# Patient Record
Sex: FEMALE | Race: WHITE | NOT HISPANIC OR LATINO | Employment: OTHER | URBAN - METROPOLITAN AREA
[De-identification: names, ages, dates, MRNs, and addresses within clinical notes are randomized per-mention and may not be internally consistent; named-entity substitution may affect disease eponyms.]

---

## 2020-01-02 ENCOUNTER — TELEPHONE (OUTPATIENT)
Dept: SCHEDULING | Facility: IMAGING CENTER | Age: 65
End: 2020-01-02

## 2020-01-07 ENCOUNTER — OFFICE VISIT (OUTPATIENT)
Dept: MEDICAL GROUP | Facility: MEDICAL CENTER | Age: 65
End: 2020-01-07
Payer: COMMERCIAL

## 2020-01-07 ENCOUNTER — HOSPITAL ENCOUNTER (OUTPATIENT)
Dept: LAB | Facility: MEDICAL CENTER | Age: 65
End: 2020-01-07
Attending: NURSE PRACTITIONER
Payer: COMMERCIAL

## 2020-01-07 VITALS
SYSTOLIC BLOOD PRESSURE: 124 MMHG | HEART RATE: 74 BPM | WEIGHT: 192 LBS | BODY MASS INDEX: 29.1 KG/M2 | OXYGEN SATURATION: 92 % | HEIGHT: 68 IN | RESPIRATION RATE: 16 BRPM | DIASTOLIC BLOOD PRESSURE: 76 MMHG

## 2020-01-07 DIAGNOSIS — E78.5 DYSLIPIDEMIA: ICD-10-CM

## 2020-01-07 DIAGNOSIS — G47.33 OSA ON CPAP: ICD-10-CM

## 2020-01-07 DIAGNOSIS — G62.9 NEUROPATHY: ICD-10-CM

## 2020-01-07 DIAGNOSIS — C85.14 B-CELL LYMPHOMA OF LYMPH NODES OF AXILLA, UNSPECIFIED B-CELL LYMPHOMA TYPE (HCC): ICD-10-CM

## 2020-01-07 DIAGNOSIS — I10 ESSENTIAL HYPERTENSION: ICD-10-CM

## 2020-01-07 DIAGNOSIS — I44.7 LBBB (LEFT BUNDLE BRANCH BLOCK): ICD-10-CM

## 2020-01-07 DIAGNOSIS — Z00.00 ROUTINE GENERAL MEDICAL EXAMINATION AT A HEALTH CARE FACILITY: ICD-10-CM

## 2020-01-07 DIAGNOSIS — Z11.59 NEED FOR HEPATITIS C SCREENING TEST: ICD-10-CM

## 2020-01-07 LAB
BASOPHILS # BLD AUTO: 0.5 % (ref 0–1.8)
BASOPHILS # BLD: 0.03 K/UL (ref 0–0.12)
EOSINOPHIL # BLD AUTO: 0.1 K/UL (ref 0–0.51)
EOSINOPHIL NFR BLD: 1.6 % (ref 0–6.9)
ERYTHROCYTE [DISTWIDTH] IN BLOOD BY AUTOMATED COUNT: 44.9 FL (ref 35.9–50)
HCT VFR BLD AUTO: 45.7 % (ref 37–47)
HGB BLD-MCNC: 14.6 G/DL (ref 12–16)
IMM GRANULOCYTES # BLD AUTO: 0.02 K/UL (ref 0–0.11)
IMM GRANULOCYTES NFR BLD AUTO: 0.3 % (ref 0–0.9)
LYMPHOCYTES # BLD AUTO: 2.27 K/UL (ref 1–4.8)
LYMPHOCYTES NFR BLD: 37.1 % (ref 22–41)
MCH RBC QN AUTO: 30.4 PG (ref 27–33)
MCHC RBC AUTO-ENTMCNC: 31.9 G/DL (ref 33.6–35)
MCV RBC AUTO: 95 FL (ref 81.4–97.8)
MONOCYTES # BLD AUTO: 0.56 K/UL (ref 0–0.85)
MONOCYTES NFR BLD AUTO: 9.2 % (ref 0–13.4)
NEUTROPHILS # BLD AUTO: 3.14 K/UL (ref 2–7.15)
NEUTROPHILS NFR BLD: 51.3 % (ref 44–72)
NRBC # BLD AUTO: 0 K/UL
NRBC BLD-RTO: 0 /100 WBC
PLATELET # BLD AUTO: 213 K/UL (ref 164–446)
PMV BLD AUTO: 10.3 FL (ref 9–12.9)
RBC # BLD AUTO: 4.81 M/UL (ref 4.2–5.4)
WBC # BLD AUTO: 6.1 K/UL (ref 4.8–10.8)

## 2020-01-07 PROCEDURE — 86803 HEPATITIS C AB TEST: CPT

## 2020-01-07 PROCEDURE — 85025 COMPLETE CBC W/AUTO DIFF WBC: CPT

## 2020-01-07 PROCEDURE — 36415 COLL VENOUS BLD VENIPUNCTURE: CPT

## 2020-01-07 PROCEDURE — 99203 OFFICE O/P NEW LOW 30 MIN: CPT | Performed by: NURSE PRACTITIONER

## 2020-01-07 RX ORDER — GABAPENTIN 100 MG/1
100 CAPSULE ORAL 3 TIMES DAILY
Qty: 270 CAP | Refills: 3 | Status: SHIPPED | OUTPATIENT
Start: 2020-01-07 | End: 2020-12-25

## 2020-01-07 RX ORDER — CARVEDILOL 3.12 MG/1
3.12 TABLET ORAL 2 TIMES DAILY WITH MEALS
Qty: 180 TAB | Refills: 3 | Status: SHIPPED | OUTPATIENT
Start: 2020-01-07 | End: 2020-12-25

## 2020-01-07 RX ORDER — LOSARTAN POTASSIUM 25 MG/1
25 TABLET ORAL DAILY
COMMUNITY
End: 2020-01-07 | Stop reason: SDUPTHER

## 2020-01-07 RX ORDER — ATORVASTATIN CALCIUM 10 MG/1
10 TABLET, FILM COATED ORAL DAILY
Qty: 90 TAB | Refills: 3 | Status: SHIPPED | OUTPATIENT
Start: 2020-01-07 | End: 2020-12-25

## 2020-01-07 RX ORDER — CARVEDILOL 3.12 MG/1
3.12 TABLET ORAL 2 TIMES DAILY WITH MEALS
COMMUNITY
End: 2020-01-07 | Stop reason: SDUPTHER

## 2020-01-07 RX ORDER — GABAPENTIN 100 MG/1
100 CAPSULE ORAL 3 TIMES DAILY
COMMUNITY
End: 2020-01-07 | Stop reason: SDUPTHER

## 2020-01-07 RX ORDER — ASPIRIN 81 MG/1
81 TABLET, CHEWABLE ORAL DAILY
COMMUNITY
End: 2022-03-29

## 2020-01-07 RX ORDER — LOSARTAN POTASSIUM 25 MG/1
25 TABLET ORAL DAILY
Qty: 90 TAB | Refills: 3 | Status: SHIPPED | OUTPATIENT
Start: 2020-01-07 | End: 2020-12-25

## 2020-01-07 SDOH — HEALTH STABILITY: MENTAL HEALTH: HOW OFTEN DO YOU HAVE 6 OR MORE DRINKS ON ONE OCCASION?: LESS THAN MONTHLY

## 2020-01-07 ASSESSMENT — PATIENT HEALTH QUESTIONNAIRE - PHQ9: CLINICAL INTERPRETATION OF PHQ2 SCORE: 0

## 2020-01-07 NOTE — PROGRESS NOTES
Subjective:      Karla Hinson is a 64 y.o. 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  She and her  recently moved here from [place] to retire.    HPI         1. Essential hypertension  Patient reports her blood pressure has been well controlled on low-dose losartan and carvedilol and she does need refills.    2. 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 LBBB (left bundle branch block)  Patient states she was following with cardiology for this and there was a feeling she may have had some heart damage from her chemotherapy for her lymphoma.  She did have a recent echocardiogram that showed an ejection fraction at 60%.  She would like to get established with local cardiology    4. LAKIA on CPAP  Patient states she uses her CPAP every night and it is helpful    5. Neuropathy  Patient has some neuropathy secondary to her chemotherapy and uses gabapentin for this    6. Dyslipidemia  Patient currently on atorvastatin 10 mg and last cholesterol levels from 1 month ago was normal    7. Need for hepatitis C screening test  Patient due for screening    8. Routine general medical examination at a health care facility  Patient due for routine blood work  History reviewed. No pertinent past medical history.  Social History     Socioeconomic History   • Marital status:      Spouse name: Not on file   • Number of children: Not on file   • Years of education: Not on file   • Highest education level: Not on file   Occupational History   • Not on file   Social Needs   • Financial  resource strain: Not on file   • Food insecurity:     Worry: Not on file     Inability: Not on file   • Transportation needs:     Medical: Not on file     Non-medical: Not on file   Tobacco Use   • Smoking status: Never Smoker   • Smokeless tobacco: Never Used   Substance and Sexual Activity   • Alcohol use: Yes     Binge frequency: Less than monthly   • Drug use: Never   • Sexual activity: Yes     Partners: Male   Lifestyle   • Physical activity:     Days per week: Not on file     Minutes per session: Not on file   • Stress: Not on file   Relationships   • Social connections:     Talks on phone: Not on file     Gets together: Not on file     Attends Yazdanism service: Not on file     Active member of club or organization: Not on file     Attends meetings of clubs or organizations: Not on file     Relationship status: Not on file   • Intimate partner violence:     Fear of current or ex partner: Not on file     Emotionally abused: Not on file     Physically abused: Not on file     Forced sexual activity: Not on file   Other Topics Concern   • Not on file   Social History Narrative   • Not on file     Current Outpatient Medications   Medication Sig Dispense Refill   • Multiple Vitamins-Minerals (MULTIVITAMIN ADULT PO) Take  by mouth.     • Calcium Carb-Cholecalciferol (CALCIUM + D3 PO) Take  by mouth.     • aspirin (ASA) 81 MG Chew Tab chewable tablet Take 81 mg by mouth every day.     • gabapentin (NEURONTIN) 100 MG Cap Take 1 Cap by mouth 3 times a day. 270 Cap 3   • atorvastatin (LIPITOR) 10 MG Tab Take 1 Tab by mouth every day. 90 Tab 3   • carvedilol (COREG) 3.125 MG Tab Take 1 Tab by mouth 2 times a day, with meals. 180 Tab 3   • losartan (COZAAR) 25 MG Tab Take 1 Tab by mouth every day. 90 Tab 3     No current facility-administered medications for this visit.      Family History   Problem Relation Age of Onset   • Hypertension Mother    • Lung Disease Father    • Cancer Father         lung   • Cancer Brother   "       MM         Review of Systems   Neurological: Positive for tingling.   All other systems reviewed and are negative.         Objective:     /76 (BP Location: Right arm, Patient Position: Sitting, BP Cuff Size: Adult)   Pulse 74   Resp 16   Ht 1.727 m (5' 8\")   Wt 87.1 kg (192 lb)   SpO2 92%   BMI 29.19 kg/m²      Physical Exam  Vitals signs and nursing note reviewed.   Constitutional:       General: She is not in acute distress.     Appearance: She is well-developed. She is not diaphoretic.   HENT:      Head: Normocephalic and atraumatic.      Right Ear: External ear normal.      Left Ear: External ear normal.      Nose: Nose normal.   Eyes:      General:         Right eye: No discharge.         Left eye: No discharge.   Neck:      Musculoskeletal: Normal range of motion and neck supple.      Thyroid: No thyromegaly.   Cardiovascular:      Rate and Rhythm: Normal rate and regular rhythm.      Heart sounds: Normal heart sounds. No murmur. No friction rub. No gallop.    Pulmonary:      Effort: Pulmonary effort is normal.      Breath sounds: Normal breath sounds. No wheezing or rales.   Musculoskeletal:         General: No tenderness.   Skin:     General: Skin is warm and dry.      Findings: No rash.   Neurological:      Mental Status: She is alert and oriented to person, place, and time.      Deep Tendon Reflexes: Reflexes normal.   Psychiatric:         Behavior: Behavior normal.         Thought Content: Thought content normal.         Judgment: Judgment normal.                 Assessment/Plan:       1. Essential hypertension  Blood pressure medications refilled and blood pressure appears controlled today.  - REFERRAL TO CARDIOLOGY  - carvedilol (COREG) 3.125 MG Tab; Take 1 Tab by mouth 2 times a day, with meals.  Dispense: 180 Tab; Refill: 3  - losartan (COZAAR) 25 MG Tab; Take 1 Tab by mouth every day.  Dispense: 90 Tab; Refill: 3    2. 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  - CBC WITH DIFFERENTIAL; Future    3. LBBB (left bundle branch block)  Patient states she was following with cardiology in [place] and would like to get established locally with cardiology for this problem  - REFERRAL TO CARDIOLOGY    4. LAKIA on CPAP  Patient will need to establish with a local sleep center to monitor her CPAP readings and do periodic studies.  - REFERRAL TO SLEEP STUDIES    5. Neuropathy  Patient feels this is helping with her neuropathy and she takes no other medicine for pain  - gabapentin (NEURONTIN) 100 MG Cap; Take 1 Cap by mouth 3 times a day.  Dispense: 270 Cap; Refill: 3    6. Dyslipidemia  Patient will continue with her atorvastatin and most recent cholesterol levels were good.    - atorvastatin (LIPITOR) 10 MG Tab; Take 1 Tab by mouth every day.  Dispense: 90 Tab; Refill: 3    7. Need for hepatitis C screening test    - HEP C VIRUS ANTIBODY; Future    8. Routine general medical examination at a health care facility  I was going to do some other lab work but it appears she is up-to-date.

## 2020-01-08 LAB — HCV AB SER QL: NEGATIVE

## 2020-01-08 ASSESSMENT — ENCOUNTER SYMPTOMS: TINGLING: 1

## 2020-02-24 ENCOUNTER — TELEPHONE (OUTPATIENT)
Dept: CARDIOLOGY | Facility: MEDICAL CENTER | Age: 65
End: 2020-02-24

## 2020-02-25 NOTE — TELEPHONE ENCOUNTER
Called patient to see if she has followed with a cardiologist in the past to get records prior to new patient appt with CR. She states she was previously seen by Dr. Jayden Lowe at [facility]. Faxed stat records request for patients records (F: [phone], P: [phone]). Fax confirmation received.

## 2020-04-21 ENCOUNTER — APPOINTMENT (OUTPATIENT)
Dept: CARDIOLOGY | Facility: MEDICAL CENTER | Age: 65
End: 2020-04-21
Payer: COMMERCIAL

## 2020-06-02 ENCOUNTER — TELEMEDICINE (OUTPATIENT)
Dept: SLEEP MEDICINE | Facility: MEDICAL CENTER | Age: 65
End: 2020-06-02
Payer: COMMERCIAL

## 2020-06-02 VITALS — HEIGHT: 68 IN | BODY MASS INDEX: 28.64 KG/M2 | WEIGHT: 189 LBS

## 2020-06-02 DIAGNOSIS — G47.33 OSA ON CPAP: ICD-10-CM

## 2020-06-02 PROCEDURE — 99203 OFFICE O/P NEW LOW 30 MIN: CPT | Mod: 95,CR | Performed by: FAMILY MEDICINE

## 2020-06-02 NOTE — PROGRESS NOTES
"Telemedicine Visit: New Patient     This encounter was conducted via Zoom . Verbal consent was obtained. Patient's identity was verified.         [facility]  Consult Note     Date: 6/2/2020 / Time: 8:48 AM    Patient ID:   Name:             Karla Perry   YOB: 1955  Age:                 65 y.o.  female   MRN:               [id number]      Thank you for requesting a sleep medicine consultation on Karla Villeda at the sleep center.She presents today with the chief complaints of LAKIA and to establish as a new pt. She was  was diagnosed with LAKIA about 10 years ago in [facility] and has been on CPAP since then. Her current CPAP is about 2 years old. She has recently moved to [place] and here to establish sleep medicine care.The initial presenting symptoms were snoring, witnessed apneas. She is referred by Richie Quan for evaluation and treatment of sleep disorder breathing .     HISTORY OF PRESENT ILLNESS:       At night,  Karla Villeda goes to bed around 10:30-11 pm on weekdays and on the weekends. She gets out of bed at 7 am on weekdays and on the weekends.  She  Averages about 7-8 hrs of sleep on a good night. Pt rarely has bad nights. She falls asleep within 10 minutes. She wakes up about 2-3 times during the night due to bathroom use and on average It takes her 2-5 min to fall back asleep.     She is not aware of snoring/breathing pauses/gasping or choking in sleep with the usage of the CPAP.  She  Has occasional symptoms of restless legs syndrome such as an \"urge to move\"  She  legs in the evening or bedtime. She  denies any symptoms of narcolepsy such as sleep paralysis or cataplexy, or any symptoms to suggest parasomnias such as sleep walking or acting out of dreams. She  has not used any medications for the sleep problem.Overall, she does finds her sleep refreshing. In terms of  excessive daytime sleepiness,  She denies of sleepiness while  at work, while " reading or watching TV or while driving.She rarely take regular naps.     REVIEW OF SYSTEMS:       Constitutional: Denies fevers, Denies weight changes  Eyes: Denies changes in vision, no eye pain  Ears/Nose/Throat/Mouth: Denies nasal congestion or sore throat   Cardiovascular: Denies chest pain or palpitations   Respiratory: Denies shortness of breath , Denies cough  Gastrointestinal/Hepatic: Denies abdominal pain, nausea, vomiting, diarrhea, constipation or GI bleeding   Genitourinary: Denies bladder dysfunction, dysuria or frequency  Musculoskeletal/Rheum: Denies  joint pain and swelling   Skin/Breast: Denies rash  Neurological: Denies headache, confusion, memory loss or focal weakness/parasthesias  Psychiatric: denies mood disorder     Comprehensive review of systems form is reviewed with the patient and is attached in the EMR.     PMH:  has a past medical history of Chickenpox and [place] measles.  MEDS:   Current Outpatient Medications:   •  Multiple Vitamins-Minerals (MULTIVITAMIN ADULT PO), Take  by mouth., Disp: , Rfl:   •  Calcium Carb-Cholecalciferol (CALCIUM + D3 PO), Take  by mouth., Disp: , Rfl:   •  aspirin (ASA) 81 MG Chew Tab chewable tablet, Take 81 mg by mouth every day., Disp: , Rfl:   •  gabapentin (NEURONTIN) 100 MG Cap, Take 1 Cap by mouth 3 times a day., Disp: 270 Cap, Rfl: 3  •  atorvastatin (LIPITOR) 10 MG Tab, Take 1 Tab by mouth every day., Disp: 90 Tab, Rfl: 3  •  carvedilol (COREG) 3.125 MG Tab, Take 1 Tab by mouth 2 times a day, with meals., Disp: 180 Tab, Rfl: 3  •  losartan (COZAAR) 25 MG Tab, Take 1 Tab by mouth every day., Disp: 90 Tab, Rfl: 3  ALLERGIES:   Allergies   Allergen Reactions   • Ampicillin      rash   • Biaxin [Clarithromycin]      rash   • Sulfa Drugs      rash     SURGHX:   Past Surgical History:   Procedure Laterality Date   • LUMBAR LAMINECTOMY DISKECTOMY     • LYMPH NODE EXCISION     • TONSILLECTOMY     • TONSILLECTOMY AND ADENOIDECTOMY     • TUBAL COAGULATION  "LAPAROSCOPIC BILATERAL       SOCHX:  reports that she has never smoked. She has never used smokeless tobacco. She reports current alcohol use. She reports that she does not use drugs.   FH:   Family History   Problem Relation Age of Onset   • Hypertension Mother    • Lung Disease Father    • Cancer Father         lung   • Cancer Brother         MM   • Sleep Apnea Neg Hx        Physical Exam:  Vitals/ General Appearance:   Weight/BMI: Body mass index is 28.74 kg/m².  Ht 1.727 m (5' 8\")   Wt 85.7 kg (189 lb)   Vitals:    06/02/20 0830   Weight: 85.7 kg (189 lb)   Height: 1.727 m (5' 8\")           Constitutional: Alert, no distress, well-groomed.  Skin: No rashes in visible areas.  Eye: Round. Conjunctiva clear, lids normal. No icterus.   ENMT: Lips pink without lesions, good dentition, moist mucous membranes. Phonation normal.  Neck: No masses, no thyromegaly. Moves freely without pain.  CV: Pulse as reported by patient  Respiratory: Unlabored respiratory effort, no cough or audible wheeze  Psych: Alert and oriented x3, normal affect and mood.   INVESTIGATIONS:     1.Obstructive Sleep Apnea     The pathophysiology of sleep anea and the increased risk of cardiovascular morbidity from untreated sleep apnea is discussed in detail with the patient. She  also has HTN which can be worsened by her LAKIA.     She is urged to avoid supine sleep, weight gain and alcoholic beverages since all of these can worsen sleep apnea. She is cautioned against drowsy driving. If She feels sleepy while driving, She must pull over for a break/nap, rather than persist on the road, in the interest of She own safety and that of others on the road.   Plan   - Continue CPAP with FFM mask    - Recommended to bring the CPAP for compliance download and to sign the release of medical record form to obtain her previous SS    - If we are unable to obtain her previous SS, I will order HST to reestablish the diagnosis   - compliance was reinforced     2. " Regarding treatment of other past medical problems and general health maintenance,  She is urged to follow up with PCP.

## 2020-06-17 ENCOUNTER — PATIENT MESSAGE (OUTPATIENT)
Dept: SLEEP MEDICINE | Facility: MEDICAL CENTER | Age: 65
End: 2020-06-17

## 2020-06-17 ENCOUNTER — TELEPHONE (OUTPATIENT)
Dept: SLEEP MEDICINE | Facility: MEDICAL CENTER | Age: 65
End: 2020-06-17

## 2020-06-17 NOTE — TELEPHONE ENCOUNTER
Pt brought in her SD card to be download compliance report is scan into media, please advise. Pt also signed release of records as well.

## 2020-06-17 NOTE — TELEPHONE ENCOUNTER
The 30 day compliance was downloaded which shows adequate compliance with more that 4 hr usage about 77%. The AHI is has improved to 5.2/hr. The mask leak is normal. Continue CPAP at the current pressure.

## 2020-06-17 NOTE — TELEPHONE ENCOUNTER
Called pt to relay Dr. Bingham regarding the pt compliance downloaded but the pt stated that she saw the [place] message and understood.

## 2020-07-10 ENCOUNTER — OFFICE VISIT (OUTPATIENT)
Dept: CARDIOLOGY | Facility: MEDICAL CENTER | Age: 65
End: 2020-07-10
Payer: COMMERCIAL

## 2020-07-10 VITALS
OXYGEN SATURATION: 97 % | WEIGHT: 190 LBS | SYSTOLIC BLOOD PRESSURE: 108 MMHG | HEIGHT: 68 IN | DIASTOLIC BLOOD PRESSURE: 50 MMHG | HEART RATE: 68 BPM | BODY MASS INDEX: 28.79 KG/M2

## 2020-07-10 DIAGNOSIS — I44.7 LBBB (LEFT BUNDLE BRANCH BLOCK): ICD-10-CM

## 2020-07-10 DIAGNOSIS — I42.8 OTHER CARDIOMYOPATHY (HCC): ICD-10-CM

## 2020-07-10 LAB — EKG IMPRESSION: NORMAL

## 2020-07-10 PROCEDURE — 93000 ELECTROCARDIOGRAM COMPLETE: CPT | Performed by: INTERNAL MEDICINE

## 2020-07-10 PROCEDURE — 99244 OFF/OP CNSLTJ NEW/EST MOD 40: CPT | Performed by: INTERNAL MEDICINE

## 2020-07-10 ASSESSMENT — ENCOUNTER SYMPTOMS
NEUROLOGICAL NEGATIVE: 1
PSYCHIATRIC NEGATIVE: 1
CONSTITUTIONAL NEGATIVE: 1
ORTHOPNEA: 0
CLAUDICATION: 0
MUSCULOSKELETAL NEGATIVE: 1
GASTROINTESTINAL NEGATIVE: 1
HEMOPTYSIS: 0
PALPITATIONS: 0
COUGH: 0
WHEEZING: 0

## 2020-07-10 NOTE — PROGRESS NOTES
Chief Complaint   Patient presents with   • LBBB     new patient   Cardiomyopathy    Subjective:   Karla Villeda is a 65 y.o. female who presents today to establish cardiac care    She is seen in consultation at request of SLOANE Stubbs for above issue.    The patient moved here from [facility] a few months ago.    She had history of ventricular tachycardia and bradycardia (HR in the 30s) during her chemotherapy (doxyrubicin, vincristine, rituximab and cyclophosphamide) for non-Hodgkin's lymphoma in 2012.  Cardiac work-up at that time was normal.   The arrhythmias lasted for a few days without any recurrences since.    In November 2017 she was noted to have left bundle branch block and atypical chest pain.  Subsequent cardiac catheterization in February 2018 showed no evidence of coronary artery disease.   Echocardiography reportedly showed mildly reduced left ventricular systolic function with ejection fraction of 40 to 45%.  She was subsequently started on carvedilol 3.125 mg twice a day and losartan.    Has repeat echocardiography in May of last year which showed normal LV systolic function.    She is asymptomatic from cardiac standpoint except slight breathless with walking up a couple flights of stair and mild swelling in her ankle at the end of the day.    Past Medical History:   Diagnosis Date   • Chickenpox    • [place] measles      Past Surgical History:   Procedure Laterality Date   • LUMBAR LAMINECTOMY DISKECTOMY     • LYMPH NODE EXCISION     • TONSILLECTOMY     • TONSILLECTOMY AND ADENOIDECTOMY     • TUBAL COAGULATION LAPAROSCOPIC BILATERAL       Family History   Problem Relation Age of Onset   • Hypertension Mother    • Lung Disease Father    • Cancer Father         lung   • Cancer Brother         MM   • Sleep Apnea Neg Hx      Social History     Socioeconomic History   • Marital status:      Spouse name: Not on file   • Number of children: Not on file   • Years of  education: Not on file   • Highest education level: Not on file   Occupational History   • Not on file   Social Needs   • Financial resource strain: Not on file   • Food insecurity     Worry: Not on file     Inability: Not on file   • Transportation needs     Medical: Not on file     Non-medical: Not on file   Tobacco Use   • Smoking status: Never Smoker   • Smokeless tobacco: Never Used   Substance and Sexual Activity   • Alcohol use: Yes     Binge frequency: Less than monthly     Comment: 1 a week    • Drug use: Never   • Sexual activity: Yes     Partners: Male   Lifestyle   • Physical activity     Days per week: Not on file     Minutes per session: Not on file   • Stress: Not on file   Relationships   • Social connections     Talks on phone: Not on file     Gets together: Not on file     Attends Yazidi service: Not on file     Active member of club or organization: Not on file     Attends meetings of clubs or organizations: Not on file     Relationship status: Not on file   • Intimate partner violence     Fear of current or ex partner: Not on file     Emotionally abused: Not on file     Physically abused: Not on file     Forced sexual activity: Not on file   Other Topics Concern   • Not on file   Social History Narrative   • Not on file     Allergies   Allergen Reactions   • Ampicillin      rash   • Biaxin [Clarithromycin]      rash   • Sulfa Drugs      rash     Outpatient Encounter Medications as of 7/10/2020   Medication Sig Dispense Refill   • Multiple Vitamins-Minerals (MULTIVITAMIN ADULT PO) Take  by mouth.     • Calcium Carb-Cholecalciferol (CALCIUM + D3 PO) Take  by mouth.     • aspirin (ASA) 81 MG Chew Tab chewable tablet Take 81 mg by mouth every day.     • gabapentin (NEURONTIN) 100 MG Cap Take 1 Cap by mouth 3 times a day. 270 Cap 3   • atorvastatin (LIPITOR) 10 MG Tab Take 1 Tab by mouth every day. 90 Tab 3   • carvedilol (COREG) 3.125 MG Tab Take 1 Tab by mouth 2 times a day, with meals. 180 Tab 3  "  • losartan (COZAAR) 25 MG Tab Take 1 Tab by mouth every day. 90 Tab 3     No facility-administered encounter medications on file as of 7/10/2020.      Review of Systems   Constitutional: Negative.    HENT: Negative.    Respiratory: Negative for cough, hemoptysis and wheezing.    Cardiovascular: Positive for leg swelling. Negative for chest pain, palpitations, orthopnea and claudication.   Gastrointestinal: Negative.    Genitourinary: Negative.    Musculoskeletal: Negative.    Skin: Negative.    Neurological: Negative.    Psychiatric/Behavioral: Negative.    All other systems reviewed and are negative.       Objective:   /50 (BP Location: Left arm, Patient Position: Sitting)   Pulse 68   Ht 1.727 m (5' 8\")   Wt 86.2 kg (190 lb)   SpO2 97%   BMI 28.89 kg/m²     Physical Exam   Constitutional: She is oriented to person, place, and time. She appears well-developed and well-nourished.   Neck: No JVD present.   Cardiovascular: Normal rate and regular rhythm. Exam reveals no gallop.   No murmur heard.  Pulmonary/Chest: Effort normal and breath sounds normal. No respiratory distress. She has no wheezes. She has no rales.   Abdominal: Soft. She exhibits no distension. There is no abdominal tenderness.   Musculoskeletal:         General: No edema.   Neurological: She is alert and oriented to person, place, and time.   Skin: Skin is warm and dry. No erythema.   Psychiatric: She has a normal mood and affect. Her behavior is normal.     EKG today by my review shows sinus rhythm at a rate of 64 bpm with left bundle branch block.    Labs from [facility] showed LDL of 66 with normal CMP in December.    Assessment:     1. LBBB (left bundle branch block)  EKG   2. Other cardiomyopathy (HCC)         Medical Decision Making:  Today's Assessment / Status / Plan:     She appears to be doing well from that standpoint.  Her LV systolic function wa normal on the most recent echocardiography about a year ago.  She is on very " low-dose carvedilol and low-dose losartan  She is inquiring about the need to continue taking those medication.  I informed her that her echocardiography 2 and half years ago reportedly showed mild LV dysfunction.  We decided to continue current medications for now with the plan to repeat echocardiography in a year or so.  If LV systolic function remain normal will consider discontinue carvedilol at that time.  We will see her back in about 6 months or sooner as needed.  We will keep you posted about our findings and further recommendations as they become available. Please also do not hesitate to call for any questions.  Thank you kindly for allowing me to participate in the care of this patient.

## 2020-07-15 ENCOUNTER — OFFICE VISIT (OUTPATIENT)
Dept: MEDICAL GROUP | Facility: MEDICAL CENTER | Age: 65
End: 2020-07-15
Payer: COMMERCIAL

## 2020-07-15 VITALS
RESPIRATION RATE: 16 BRPM | HEIGHT: 68 IN | HEART RATE: 73 BPM | BODY MASS INDEX: 28.79 KG/M2 | WEIGHT: 190 LBS | SYSTOLIC BLOOD PRESSURE: 118 MMHG | OXYGEN SATURATION: 95 % | DIASTOLIC BLOOD PRESSURE: 72 MMHG

## 2020-07-15 DIAGNOSIS — Z78.0 MENOPAUSE: ICD-10-CM

## 2020-07-15 DIAGNOSIS — I44.7 LBBB (LEFT BUNDLE BRANCH BLOCK): ICD-10-CM

## 2020-07-15 DIAGNOSIS — I10 ESSENTIAL HYPERTENSION: ICD-10-CM

## 2020-07-15 DIAGNOSIS — Z12.39 SCREENING FOR BREAST CANCER: ICD-10-CM

## 2020-07-15 DIAGNOSIS — Z00.00 ROUTINE GENERAL MEDICAL EXAMINATION AT A HEALTH CARE FACILITY: ICD-10-CM

## 2020-07-15 PROCEDURE — 99213 OFFICE O/P EST LOW 20 MIN: CPT | Performed by: NURSE PRACTITIONER

## 2020-07-15 NOTE — PROGRESS NOTES
Subjective:      Karla Villeda is a 65 y.o. female who presents with Follow-Up (6 month )        CC: Patient is here today for 6-month follow-up on hypertension, left bundle branch block and need of health maintenance.    HPI       1. Essential hypertension  Patient established for her initial visit in January after moving here from [place].  She has been continuing with her medicines including carvedilol and losartan and blood pressure has been running low normal.  She was advised by cardiology that possibly the carvedilol could be stopped in the future after her echocardiogram follow-up.  She reports no dizziness or falls.    2. LBBB (left bundle branch block)  Patient seen by cardiology locally in July.  She had previously been following with cardiology in [place].  She has history of ventricular tachycardia and bradycardia during her chemotherapy.  She was later found to have a left bundle branch block and had a cardiac catheterization in 2018.  She had decreased ejection fraction and was started on carvedilol and losartan.  She has been asymptomatic since then.    3. Routine general medical examination at a health care facility  Patient will be due for routine blood work before her next visit    4. Menopause  Patient has never had bone density scan.    5. Screening for breast cancer  Patient due for her yearly mammogram with the last done in [place]  Past Medical History:   Diagnosis Date   • Chickenpox    • Citizen of [place] measles      Social History     Socioeconomic History   • Marital status:      Spouse name: Not on file   • Number of children: Not on file   • Years of education: Not on file   • Highest education level: Not on file   Occupational History   • Not on file   Social Needs   • Financial resource strain: Not on file   • Food insecurity     Worry: Not on file     Inability: Not on file   • Transportation needs     Medical: Not on file     Non-medical: Not on file   Tobacco Use    • Smoking status: Never Smoker   • Smokeless tobacco: Never Used   Substance and Sexual Activity   • Alcohol use: Yes     Binge frequency: Less than monthly     Comment: 1 a week    • Drug use: Never   • Sexual activity: Yes     Partners: Male   Lifestyle   • Physical activity     Days per week: Not on file     Minutes per session: Not on file   • Stress: Not on file   Relationships   • Social connections     Talks on phone: Not on file     Gets together: Not on file     Attends Restoration service: Not on file     Active member of club or organization: Not on file     Attends meetings of clubs or organizations: Not on file     Relationship status: Not on file   • Intimate partner violence     Fear of current or ex partner: Not on file     Emotionally abused: Not on file     Physically abused: Not on file     Forced sexual activity: Not on file   Other Topics Concern   • Not on file   Social History Narrative   • Not on file     Current Outpatient Medications   Medication Sig Dispense Refill   • Multiple Vitamins-Minerals (MULTIVITAMIN ADULT PO) Take  by mouth.     • Calcium Carb-Cholecalciferol (CALCIUM + D3 PO) Take  by mouth.     • aspirin (ASA) 81 MG Chew Tab chewable tablet Take 81 mg by mouth every day.     • gabapentin (NEURONTIN) 100 MG Cap Take 1 Cap by mouth 3 times a day. 270 Cap 3   • atorvastatin (LIPITOR) 10 MG Tab Take 1 Tab by mouth every day. 90 Tab 3   • carvedilol (COREG) 3.125 MG Tab Take 1 Tab by mouth 2 times a day, with meals. 180 Tab 3   • losartan (COZAAR) 25 MG Tab Take 1 Tab by mouth every day. 90 Tab 3     No current facility-administered medications for this visit.      Family History   Problem Relation Age of Onset   • Hypertension Mother    • Lung Disease Father    • Cancer Father         lung   • Cancer Brother         MM   • Sleep Apnea Neg Hx          Review of Systems   All other systems reviewed and are negative.         Objective:     /72 (BP Location: Right arm, Patient  "Position: Sitting, BP Cuff Size: Adult)   Pulse 73   Resp 16   Ht 1.727 m (5' 8\")   Wt 86.2 kg (190 lb)   SpO2 95%   BMI 28.89 kg/m²      Physical Exam  Vitals signs and nursing note reviewed.   Constitutional:       General: She is not in acute distress.     Appearance: She is well-developed. She is not diaphoretic.   HENT:      Head: Normocephalic and atraumatic.      Right Ear: External ear normal.      Left Ear: External ear normal.      Nose: Nose normal.   Eyes:      General:         Right eye: No discharge.         Left eye: No discharge.   Neck:      Musculoskeletal: Normal range of motion and neck supple.      Thyroid: No thyromegaly.   Cardiovascular:      Rate and Rhythm: Normal rate and regular rhythm.      Heart sounds: Normal heart sounds. No murmur. No friction rub. No gallop.    Pulmonary:      Effort: Pulmonary effort is normal.      Breath sounds: Normal breath sounds. No wheezing or rales.   Musculoskeletal:         General: No tenderness.   Skin:     General: Skin is warm and dry.      Findings: No rash.   Neurological:      Mental Status: She is alert and oriented to person, place, and time.      Deep Tendon Reflexes: Reflexes normal.   Psychiatric:         Behavior: Behavior normal.         Thought Content: Thought content normal.         Judgment: Judgment normal.                 Assessment/Plan:       1. Essential hypertension  Blood pressure well controlled and on the low end of normal and I agree that if her next imaging study shows good function, she probably can come off her beta-blocker.    2. LBBB (left bundle branch block)  Patient seen recently by cardiology and has 6-month follow-up but appears to be asymptomatic.    3. Routine general medical examination at a health care facility  Patient due for routine blood work and she does mention that at one time her TSH was slightly elevated but no follow-up was completed.  - Lipid Profile; Future  - Comp Metabolic Panel; Future  - " TSH+FREE T4    4. Menopause  Patient due for bone density scan.  - DS-BONE DENSITY STUDY (DEXA); Future    5. Screening for breast cancer  Patient due for screening mammogram  - MA-SCREENING MAMMO BILAT W/TOMOSYNTHESIS W/CAD; Future

## 2020-07-27 ENCOUNTER — TELEPHONE (OUTPATIENT)
Dept: PULMONOLOGY | Facility: HOSPICE | Age: 65
End: 2020-07-27

## 2020-07-27 DIAGNOSIS — G47.33 OSA (OBSTRUCTIVE SLEEP APNEA): ICD-10-CM

## 2020-08-04 NOTE — TELEPHONE ENCOUNTER
Received response from where medical records request was faxed for sleep studies advising no records were available for the patient (scanned into [employer])      Please advise pt that it would be in her best interest to repeat the dx sleep study so she can get supplies , since pt is only a CPAP a HSS is appropriate for both supplies and a replacement machine in the future     I have not yet placed any orders

## 2020-08-05 NOTE — TELEPHONE ENCOUNTER
Called pt to inform her that we received a response regarding the release of records that was sent to  [facility]. Unfortunately no sleep studies records were available for the patient. I informed the pt that in her best interest to repeat the dx sleep study so she can get supplies , since pt is only a CPAP a HSS is appropriate for both supplies and a replacement machine in the future     Pt agreed to complete a HST and after she completes that testing she would like a need order place for supplies.     Please pankaj pending order

## 2020-08-05 NOTE — TELEPHONE ENCOUNTER
Called to pt to schedule HST and I was able to schedule the pt on 8/27/2020. I informed the pt that she can come in between 10-12 or 1-3pm and I also informed her of the drop of time.

## 2020-08-27 ENCOUNTER — HOME STUDY (OUTPATIENT)
Dept: SLEEP MEDICINE | Facility: MEDICAL CENTER | Age: 65
End: 2020-08-27
Attending: FAMILY MEDICINE
Payer: COMMERCIAL

## 2020-08-27 DIAGNOSIS — G47.33 OSA (OBSTRUCTIVE SLEEP APNEA): ICD-10-CM

## 2020-08-31 PROCEDURE — 95806 SLEEP STUDY UNATT&RESP EFFT: CPT | Performed by: INTERNAL MEDICINE

## 2020-08-31 NOTE — PROCEDURES
The patient is a 65-year-old female with documented history of obstructive sleep apnea syndrome.  Home polysomnography requested for reassessment of LAKIA.      A total recording time of 377 minutes was obtained, with periodic dropped data observed.    There were 39 snore episodes noted associated with obstructive apneas and hypopneas.  The overall VICKI was 14/h.  While supine the VICKI was 57.5/h.  There were associated oxygen desaturations for 35 minutes to a conchita of 80%.    Interpretation:  Moderate obstructive sleep apnea syndrome overall VICKI 14/h with desaturations to 80% SPO2.    Recommendations:  Treatment options for LAKIA include CPAP, an oral appliance, potentially UPPP.  The patient should follow-up in the sleep clinic to discuss appropriate treatment.

## 2020-09-01 ENCOUNTER — TELEPHONE (OUTPATIENT)
Dept: PULMONOLOGY | Facility: HOSPICE | Age: 65
End: 2020-09-01

## 2020-09-01 DIAGNOSIS — G47.33 OSA (OBSTRUCTIVE SLEEP APNEA): ICD-10-CM

## 2020-09-01 NOTE — TELEPHONE ENCOUNTER
Please sign updated order for mask and supplies    Sarah Beth do you remember if pt wanted to use [facility], [employer], [employer] or [employer] for supplies?

## 2020-09-02 NOTE — TELEPHONE ENCOUNTER
Pt called back and stated that she does not have a DME company in mind and would like us to pick one. I informed the that I will be sending the order to be DME:  [facility] / stephen [phone] / fx [phone] with all the need information. I also provided pt with there contact information.      Order was fax to DME:  [employer] / [employer] [phone] / fx [phone]

## 2020-09-02 NOTE — TELEPHONE ENCOUNTER
LVM for the pt asking her if she has a specific DME that she would like the order for mask and supplies to be fax to. If not we can select one for her but I advise her to give us a call back regarding this.

## 2020-10-05 ENCOUNTER — HOSPITAL ENCOUNTER (OUTPATIENT)
Dept: RADIOLOGY | Facility: MEDICAL CENTER | Age: 65
End: 2020-10-05
Payer: COMMERCIAL

## 2020-11-10 ENCOUNTER — HOSPITAL ENCOUNTER (OUTPATIENT)
Dept: RADIOLOGY | Facility: MEDICAL CENTER | Age: 65
End: 2020-11-10
Attending: NURSE PRACTITIONER
Payer: COMMERCIAL

## 2020-11-10 DIAGNOSIS — Z78.0 MENOPAUSE: ICD-10-CM

## 2020-11-10 DIAGNOSIS — Z12.39 SCREENING FOR BREAST CANCER: ICD-10-CM

## 2020-11-10 PROCEDURE — 77067 SCR MAMMO BI INCL CAD: CPT

## 2020-11-10 PROCEDURE — 77080 DXA BONE DENSITY AXIAL: CPT

## 2020-11-19 DIAGNOSIS — G47.33 OSA ON CPAP: ICD-10-CM

## 2020-12-16 ENCOUNTER — PATIENT OUTREACH (OUTPATIENT)
Dept: HEALTH INFORMATION MANAGEMENT | Facility: OTHER | Age: 65
End: 2020-12-16

## 2020-12-25 DIAGNOSIS — G62.9 NEUROPATHY: ICD-10-CM

## 2020-12-25 DIAGNOSIS — I10 ESSENTIAL HYPERTENSION: ICD-10-CM

## 2020-12-25 DIAGNOSIS — E78.5 DYSLIPIDEMIA: ICD-10-CM

## 2020-12-25 RX ORDER — CARVEDILOL 3.12 MG/1
TABLET ORAL
Qty: 180 TAB | Refills: 3 | Status: SHIPPED | OUTPATIENT
Start: 2020-12-25 | End: 2021-01-21 | Stop reason: SDUPTHER

## 2020-12-25 RX ORDER — GABAPENTIN 100 MG/1
CAPSULE ORAL
Qty: 270 CAP | Refills: 3 | Status: SHIPPED | OUTPATIENT
Start: 2020-12-25 | End: 2021-01-21 | Stop reason: SDUPTHER

## 2020-12-25 RX ORDER — LOSARTAN POTASSIUM 25 MG/1
TABLET ORAL
Qty: 90 TAB | Refills: 3 | Status: SHIPPED | OUTPATIENT
Start: 2020-12-25 | End: 2021-01-21 | Stop reason: SDUPTHER

## 2020-12-25 RX ORDER — ATORVASTATIN CALCIUM 10 MG/1
TABLET, FILM COATED ORAL
Qty: 90 TAB | Refills: 3 | Status: SHIPPED | OUTPATIENT
Start: 2020-12-25 | End: 2021-01-18

## 2021-01-13 ENCOUNTER — HOSPITAL ENCOUNTER (OUTPATIENT)
Dept: LAB | Facility: MEDICAL CENTER | Age: 66
End: 2021-01-13
Attending: NURSE PRACTITIONER
Payer: COMMERCIAL

## 2021-01-13 DIAGNOSIS — Z00.00 ROUTINE GENERAL MEDICAL EXAMINATION AT A HEALTH CARE FACILITY: ICD-10-CM

## 2021-01-13 LAB
ALBUMIN SERPL BCP-MCNC: 4.4 G/DL (ref 3.2–4.9)
ALBUMIN/GLOB SERPL: 1.8 G/DL
ALP SERPL-CCNC: 86 U/L (ref 30–99)
ALT SERPL-CCNC: 22 U/L (ref 2–50)
ANION GAP SERPL CALC-SCNC: 8 MMOL/L (ref 7–16)
AST SERPL-CCNC: 18 U/L (ref 12–45)
BILIRUB SERPL-MCNC: 0.8 MG/DL (ref 0.1–1.5)
BUN SERPL-MCNC: 15 MG/DL (ref 8–22)
CALCIUM SERPL-MCNC: 10.2 MG/DL (ref 8.5–10.5)
CHLORIDE SERPL-SCNC: 103 MMOL/L (ref 96–112)
CHOLEST SERPL-MCNC: 130 MG/DL (ref 100–199)
CO2 SERPL-SCNC: 27 MMOL/L (ref 20–33)
CREAT SERPL-MCNC: 0.84 MG/DL (ref 0.5–1.4)
FASTING STATUS PATIENT QL REPORTED: NORMAL
GLOBULIN SER CALC-MCNC: 2.5 G/DL (ref 1.9–3.5)
GLUCOSE SERPL-MCNC: 92 MG/DL (ref 65–99)
HDLC SERPL-MCNC: 60 MG/DL
LDLC SERPL CALC-MCNC: 53 MG/DL
POTASSIUM SERPL-SCNC: 4.3 MMOL/L (ref 3.6–5.5)
PROT SERPL-MCNC: 6.9 G/DL (ref 6–8.2)
SODIUM SERPL-SCNC: 138 MMOL/L (ref 135–145)
T4 FREE SERPL-MCNC: 1.1 NG/DL (ref 0.93–1.7)
TRIGL SERPL-MCNC: 84 MG/DL (ref 0–149)
TSH SERPL DL<=0.005 MIU/L-ACNC: 3.01 UIU/ML (ref 0.38–5.33)

## 2021-01-13 PROCEDURE — 36415 COLL VENOUS BLD VENIPUNCTURE: CPT

## 2021-01-13 PROCEDURE — 80061 LIPID PANEL: CPT

## 2021-01-13 PROCEDURE — 84443 ASSAY THYROID STIM HORMONE: CPT

## 2021-01-13 PROCEDURE — 84439 ASSAY OF FREE THYROXINE: CPT

## 2021-01-13 PROCEDURE — 80053 COMPREHEN METABOLIC PANEL: CPT

## 2021-01-18 ENCOUNTER — OFFICE VISIT (OUTPATIENT)
Dept: CARDIOLOGY | Facility: MEDICAL CENTER | Age: 66
End: 2021-01-18
Payer: COMMERCIAL

## 2021-01-18 VITALS
HEIGHT: 68 IN | WEIGHT: 187 LBS | BODY MASS INDEX: 28.34 KG/M2 | OXYGEN SATURATION: 96 % | HEART RATE: 70 BPM | DIASTOLIC BLOOD PRESSURE: 48 MMHG | SYSTOLIC BLOOD PRESSURE: 106 MMHG

## 2021-01-18 DIAGNOSIS — I44.7 LBBB (LEFT BUNDLE BRANCH BLOCK): ICD-10-CM

## 2021-01-18 DIAGNOSIS — I42.8 NONISCHEMIC CARDIOMYOPATHY (HCC): ICD-10-CM

## 2021-01-18 DIAGNOSIS — I10 ESSENTIAL HYPERTENSION: ICD-10-CM

## 2021-01-18 PROCEDURE — 99214 OFFICE O/P EST MOD 30 MIN: CPT | Performed by: INTERNAL MEDICINE

## 2021-01-18 ASSESSMENT — ENCOUNTER SYMPTOMS
NEUROLOGICAL NEGATIVE: 1
NAUSEA: 0
WEIGHT LOSS: 0
MYALGIAS: 0
BRUISES/BLEEDS EASILY: 0
RESPIRATORY NEGATIVE: 1
ABDOMINAL PAIN: 0
CARDIOVASCULAR NEGATIVE: 1

## 2021-01-18 ASSESSMENT — FIBROSIS 4 INDEX: FIB4 SCORE: 1.15

## 2021-01-18 NOTE — PROGRESS NOTES
Chief Complaint   Patient presents with   • Cardiomyopathy (non ischemic)     follow up   Statin use  LBBB    Subjective:   Karla Villeda is a 65 y.o. female who presents today for f/u above issues    The patient moved here from [facility] last year.     She had history of ventricular tachycardia and bradycardia (HR in the 30s) during her chemotherapy (doxyrubicin, vincristine, rituximab and cyclophosphamide) for non-Hodgkin's lymphoma in 2012.  Cardiac work-up at that time was normal.   The arrhythmias lasted for a few days without any recurrences since.     In November 2017 she was noted to have left bundle branch block and atypical chest pain.  Subsequent cardiac catheterization in February 2018 showed no evidence of coronary artery disease.   Echocardiography reportedly showed mildly reduced left ventricular systolic function with ejection fraction of 40 to 45%.    She has been on low dose carvedilol since.    Last ECHO over a year ago. EF 65%    The patient is doing well from cardiac standpoint.  Denies chest pain, palpitations, syncope or HF symptoms.  Denies side effects from cardiac medications.    BP has been in the low normal range     Labs from last week LDL 53 CMP normal  On atorvastatin 10 mg/d    She would like to stop statin       Past Medical History:   Diagnosis Date   • Chickenpox    • [place] measles      Past Surgical History:   Procedure Laterality Date   • LUMBAR LAMINECTOMY DISKECTOMY     • LYMPH NODE EXCISION     • TONSILLECTOMY     • TONSILLECTOMY AND ADENOIDECTOMY     • TUBAL COAGULATION LAPAROSCOPIC BILATERAL       Family History   Problem Relation Age of Onset   • Hypertension Mother    • Lung Disease Father    • Cancer Father         lung   • Cancer Brother         MM   • Sleep Apnea Neg Hx    • Heart Attack Neg Hx      Social History     Socioeconomic History   • Marital status:      Spouse name: Not on file   • Number of children: Not on file   • Years of  education: Not on file   • Highest education level: Not on file   Occupational History   • Not on file   Social Needs   • Financial resource strain: Not on file   • Food insecurity     Worry: Not on file     Inability: Not on file   • Transportation needs     Medical: Not on file     Non-medical: Not on file   Tobacco Use   • Smoking status: Never Smoker   • Smokeless tobacco: Never Used   Substance and Sexual Activity   • Alcohol use: Yes     Binge frequency: Less than monthly     Comment: 1 a week    • Drug use: Never   • Sexual activity: Yes     Partners: Male   Lifestyle   • Physical activity     Days per week: Not on file     Minutes per session: Not on file   • Stress: Not on file   Relationships   • Social connections     Talks on phone: Not on file     Gets together: Not on file     Attends Gnosticism service: Not on file     Active member of club or organization: Not on file     Attends meetings of clubs or organizations: Not on file     Relationship status: Not on file   • Intimate partner violence     Fear of current or ex partner: Not on file     Emotionally abused: Not on file     Physically abused: Not on file     Forced sexual activity: Not on file   Other Topics Concern   • Not on file   Social History Narrative   • Not on file     Allergies   Allergen Reactions   • Ampicillin      rash   • Biaxin [Clarithromycin]      rash   • Sulfa Drugs      rash     Outpatient Encounter Medications as of 1/18/2021   Medication Sig Dispense Refill   • losartan (COZAAR) 25 MG Tab TAKE 1 TABLET BY MOUTH EVERY DAY 90 Tab 3   • gabapentin (NEURONTIN) 100 MG Cap TAKE 1 CAPSULE BY MOUTH THREE TIMES A  Cap 3   • carvedilol (COREG) 3.125 MG Tab TAKE 1 TABLET BY MOUTH TWICE A DAY WITH MEALS 180 Tab 3   • Multiple Vitamins-Minerals (MULTIVITAMIN ADULT PO) Take  by mouth.     • Calcium Carb-Cholecalciferol (CALCIUM + D3 PO) Take  by mouth.     • aspirin (ASA) 81 MG Chew Tab chewable tablet Take 81 mg by mouth every  "day.     • [DISCONTINUED] atorvastatin (LIPITOR) 10 MG Tab TAKE 1 TABLET BY MOUTH EVERY DAY 90 Tab 3     No facility-administered encounter medications on file as of 1/18/2021.      Review of Systems   Constitutional: Negative for malaise/fatigue and weight loss.   Respiratory: Negative.    Cardiovascular: Negative.    Gastrointestinal: Negative for abdominal pain and nausea.   Musculoskeletal: Negative for myalgias.   Neurological: Negative.    Endo/Heme/Allergies: Does not bruise/bleed easily.        Objective:   /48 (BP Location: Left arm, Patient Position: Sitting)   Pulse 70   Ht 1.727 m (5' 8\")   Wt 84.8 kg (187 lb)   SpO2 96%   BMI 28.43 kg/m²     Physical Exam   Constitutional: She is oriented to person, place, and time. She appears well-developed and well-nourished.   Neck: No JVD present.   Cardiovascular: Normal rate and regular rhythm. Exam reveals no gallop.   No murmur heard.  Pulmonary/Chest: Effort normal and breath sounds normal. No respiratory distress. She has no wheezes. She has no rales.   Abdominal: Soft. She exhibits no distension. There is no abdominal tenderness.   Musculoskeletal:         General: No edema.   Neurological: She is alert and oriented to person, place, and time.   Skin: Skin is warm and dry. No erythema.   Psychiatric: She has a normal mood and affect. Her behavior is normal.       Assessment:     1. LBBB (left bundle branch block)  EC-ECHOCARDIOGRAM COMPLETE W/ CONT   2. Essential hypertension     3. Nonischemic cardiomyopathy (HCC)  EC-ECHOCARDIOGRAM COMPLETE W/ CONT    Lipid Profile       Medical Decision Making:  Today's Assessment / Status / Plan:     For her cardiomyopathy, will repeat ECHO. If EF remained normal, will consider reduce or d/c one of her medications.    Her LDL has been low despite very low dose statin.  She has no CAD by cath. She wishes to try without statin for now.  Will recheck ;lipids in 6 months. Will see her then.  We will keep you " posted about our findings and further recommendations as they become available. Please also do not hesitate to call for any questions.  Thank you kindly for allowing me to participate in the care of this patient.

## 2021-01-20 ENCOUNTER — HOSPITAL ENCOUNTER (OUTPATIENT)
Dept: CARDIOLOGY | Facility: MEDICAL CENTER | Age: 66
End: 2021-01-20
Attending: INTERNAL MEDICINE
Payer: COMMERCIAL

## 2021-01-20 DIAGNOSIS — I44.7 LBBB (LEFT BUNDLE BRANCH BLOCK): ICD-10-CM

## 2021-01-20 DIAGNOSIS — I42.8 NONISCHEMIC CARDIOMYOPATHY (HCC): ICD-10-CM

## 2021-01-20 PROCEDURE — 700117 HCHG RX CONTRAST REV CODE 255: Performed by: INTERNAL MEDICINE

## 2021-01-20 PROCEDURE — 93306 TTE W/DOPPLER COMPLETE: CPT

## 2021-01-20 RX ADMIN — HUMAN ALBUMIN MICROSPHERES AND PERFLUTREN 3 ML: 10; .22 INJECTION, SOLUTION INTRAVENOUS at 18:30

## 2021-01-21 ENCOUNTER — OFFICE VISIT (OUTPATIENT)
Dept: MEDICAL GROUP | Facility: MEDICAL CENTER | Age: 66
End: 2021-01-21
Payer: COMMERCIAL

## 2021-01-21 VITALS
TEMPERATURE: 97.3 F | RESPIRATION RATE: 16 BRPM | OXYGEN SATURATION: 91 % | SYSTOLIC BLOOD PRESSURE: 114 MMHG | HEART RATE: 73 BPM | HEIGHT: 68 IN | BODY MASS INDEX: 27.89 KG/M2 | WEIGHT: 184 LBS | DIASTOLIC BLOOD PRESSURE: 72 MMHG

## 2021-01-21 DIAGNOSIS — G62.9 NEUROPATHY: ICD-10-CM

## 2021-01-21 DIAGNOSIS — G47.33 OSA ON CPAP: ICD-10-CM

## 2021-01-21 DIAGNOSIS — I10 ESSENTIAL HYPERTENSION: ICD-10-CM

## 2021-01-21 DIAGNOSIS — L98.9 SKIN LESIONS: ICD-10-CM

## 2021-01-21 DIAGNOSIS — I44.7 LBBB (LEFT BUNDLE BRANCH BLOCK): ICD-10-CM

## 2021-01-21 LAB
LV EJECT FRACT  99904: 60
LV EJECT FRACT MOD 2C 99903: 70.18
LV EJECT FRACT MOD 4C 99902: 61.53
LV EJECT FRACT MOD BP 99901: 65.33

## 2021-01-21 PROCEDURE — 99213 OFFICE O/P EST LOW 20 MIN: CPT | Performed by: NURSE PRACTITIONER

## 2021-01-21 PROCEDURE — 93306 TTE W/DOPPLER COMPLETE: CPT | Mod: 26 | Performed by: INTERNAL MEDICINE

## 2021-01-21 RX ORDER — LOSARTAN POTASSIUM 25 MG/1
25 TABLET ORAL
Qty: 90 TAB | Refills: 3 | Status: SHIPPED | OUTPATIENT
Start: 2021-01-21 | End: 2021-04-01 | Stop reason: SDUPTHER

## 2021-01-21 RX ORDER — CARVEDILOL 3.12 MG/1
TABLET ORAL
Qty: 180 TAB | Refills: 3 | Status: SHIPPED | OUTPATIENT
Start: 2021-01-21 | End: 2021-01-26

## 2021-01-21 RX ORDER — GABAPENTIN 100 MG/1
CAPSULE ORAL
Qty: 270 CAP | Refills: 3 | Status: SHIPPED | OUTPATIENT
Start: 2021-01-21 | End: 2021-09-28 | Stop reason: SDUPTHER

## 2021-01-21 ASSESSMENT — PATIENT HEALTH QUESTIONNAIRE - PHQ9: CLINICAL INTERPRETATION OF PHQ2 SCORE: 0

## 2021-01-21 ASSESSMENT — FIBROSIS 4 INDEX: FIB4 SCORE: 1.15

## 2021-01-21 NOTE — PROGRESS NOTES
Subjective:      Karla Villeda is a 65 y.o. 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    HPI       1. Essential hypertension  Patient continues on her Coreg and losartan with good blood pressure readings in the office today.    2. LBBB (left bundle branch block)  Patient recently seen by cardiology and her statin was stopped because of good cholesterol and patient's choice.  She did have an echocardiogram done but results are not back yet.  She states she has no chest pain, palpitations or breathing issues.  She is using her aspirin, ARB and beta-blocker.    3. LAKIA on CPAP  Patient continues on CPAP at night    4. Neuropathy  Patient would like to get refills on gabapentin which she uses low-dose for her neuropathy and finds it helpful    5. Skin lesions  Patient states she has history of actinic keratoses which were treated in [place] where she previously lived.  She has some new areas coming back on her arms and hands and would like to be referred to local dermatology.  Past Medical History:   Diagnosis Date   • Chickenpox    • [place] measles      Social History     Socioeconomic History   • Marital status:      Spouse name: Not on file   • Number of children: Not on file   • Years of education: Not on file   • Highest education level: Not on file   Occupational History   • Not on file   Social Needs   • Financial resource strain: Not on file   • Food insecurity     Worry: Not on file     Inability: Not on file   • Transportation needs     Medical: Not on file     Non-medical: Not on file   Tobacco Use   • Smoking status: Never Smoker   • Smokeless tobacco: Never Used   Substance and Sexual Activity   • Alcohol use: Yes     Binge frequency: Less than monthly     Comment: 1 a week    • Drug use: Never   •  "Sexual activity: Yes     Partners: Male   Lifestyle   • Physical activity     Days per week: Not on file     Minutes per session: Not on file   • Stress: Not on file   Relationships   • Social connections     Talks on phone: Not on file     Gets together: Not on file     Attends Tenriism service: Not on file     Active member of club or organization: Not on file     Attends meetings of clubs or organizations: Not on file     Relationship status: Not on file   • Intimate partner violence     Fear of current or ex partner: Not on file     Emotionally abused: Not on file     Physically abused: Not on file     Forced sexual activity: Not on file   Other Topics Concern   • Not on file   Social History Narrative   • Not on file     Current Outpatient Medications   Medication Sig Dispense Refill   • losartan (COZAAR) 25 MG Tab Take 1 Tab by mouth every day. 90 Tab 3   • gabapentin (NEURONTIN) 100 MG Cap TAKE 1 CAPSULE BY MOUTH THREE TIMES A  Cap 3   • carvedilol (COREG) 3.125 MG Tab TAKE 1 TABLET BY MOUTH TWICE A DAY WITH MEALS 180 Tab 3   • Multiple Vitamins-Minerals (MULTIVITAMIN ADULT PO) Take  by mouth.     • Calcium Carb-Cholecalciferol (CALCIUM + D3 PO) Take  by mouth.     • aspirin (ASA) 81 MG Chew Tab chewable tablet Take 81 mg by mouth every day.       No current facility-administered medications for this visit.      Family History   Problem Relation Age of Onset   • Hypertension Mother    • Lung Disease Father    • Cancer Father         lung   • Cancer Brother         MM   • Sleep Apnea Neg Hx    • Heart Attack Neg Hx          Review of Systems   Skin: Positive for rash.   All other systems reviewed and are negative.         Objective:     /72 (BP Location: Right arm, Patient Position: Sitting, BP Cuff Size: Adult)   Pulse 73   Temp 36.3 °C (97.3 °F) (Temporal)   Resp 16   Ht 1.727 m (5' 8\")   Wt 83.5 kg (184 lb)   SpO2 91%   BMI 27.98 kg/m²      Physical Exam  Vitals signs and nursing note " reviewed.   Constitutional:       General: She is not in acute distress.     Appearance: She is well-developed. She is not diaphoretic.   HENT:      Head: Normocephalic and atraumatic.      Right Ear: External ear normal.      Left Ear: External ear normal.      Nose: Nose normal.   Eyes:      General:         Right eye: No discharge.         Left eye: No discharge.   Neck:      Musculoskeletal: Normal range of motion and neck supple.      Thyroid: No thyromegaly.   Cardiovascular:      Rate and Rhythm: Normal rate and regular rhythm.      Heart sounds: Normal heart sounds. No murmur. No friction rub. No gallop.    Pulmonary:      Effort: Pulmonary effort is normal.      Breath sounds: Normal breath sounds. No wheezing or rales.   Musculoskeletal:         General: No tenderness.   Skin:     General: Skin is warm and dry.      Findings: Rash present.      Comments: There are some scaly, erythematous, macular areas on the hands and arms bilaterally.   Neurological:      Mental Status: She is alert and oriented to person, place, and time.      Deep Tendon Reflexes: Reflexes normal.   Psychiatric:         Behavior: Behavior normal.         Thought Content: Thought content normal.         Judgment: Judgment normal.                 Assessment/Plan:        1. Essential hypertension  Blood pressure appears controlled and she will continue on same medication  - losartan (COZAAR) 25 MG Tab; Take 1 Tab by mouth every day.  Dispense: 90 Tab; Refill: 3  - carvedilol (COREG) 3.125 MG Tab; TAKE 1 TABLET BY MOUTH TWICE A DAY WITH MEALS  Dispense: 180 Tab; Refill: 3    2. LBBB (left bundle branch block)  Patient is awaiting the results of her echocardiogram with contrast and then will follow with cardiology regarding results and she states today she is asymptomatic.    3. LAKIA on CPAP  Patient will continue with CPAP at night    4. Neuropathy  Patient will continue on gabapentin which she finds helpful for her neuropathy.  -  gabapentin (NEURONTIN) 100 MG Cap; TAKE 1 CAPSULE BY MOUTH THREE TIMES A DAY  Dispense: 270 Cap; Refill: 3    5. Skin lesions  Patient will be referred to dermatology to see if she needs cryotherapy.  - REFERRAL TO DERMATOLOGY

## 2021-01-26 ENCOUNTER — PATIENT MESSAGE (OUTPATIENT)
Dept: CARDIOLOGY | Facility: MEDICAL CENTER | Age: 66
End: 2021-01-26

## 2021-01-26 NOTE — TELEPHONE ENCOUNTER
----- Message from Amy Patel M.D. sent at 1/25/2021 11:39 AM PST -----  ECHO Showed NL hear function, may stop carvedilol  ----- Message -----  From: Mckayla Rosa R.N.  Sent: 1/21/2021   5:26 PM PST  To: Amy Patel M.D.    FV scheduled with you 7/26/2021, thank you!

## 2021-03-03 DIAGNOSIS — Z23 NEED FOR VACCINATION: ICD-10-CM

## 2021-03-09 ENCOUNTER — OFFICE VISIT (OUTPATIENT)
Dept: DERMATOLOGY | Facility: IMAGING CENTER | Age: 66
End: 2021-03-09
Payer: MEDICARE

## 2021-03-09 VITALS — BODY MASS INDEX: 27.28 KG/M2 | HEIGHT: 68 IN | TEMPERATURE: 97.8 F | WEIGHT: 180 LBS

## 2021-03-09 DIAGNOSIS — L81.4 LENTIGINES: ICD-10-CM

## 2021-03-09 DIAGNOSIS — L57.0 ACTINIC KERATOSES: ICD-10-CM

## 2021-03-09 DIAGNOSIS — Z80.8 FAMILY HISTORY OF MELANOMA: ICD-10-CM

## 2021-03-09 DIAGNOSIS — D22.9 MULTIPLE MELANOCYTIC NEVI: ICD-10-CM

## 2021-03-09 PROCEDURE — 99203 OFFICE O/P NEW LOW 30 MIN: CPT | Mod: 25 | Performed by: DERMATOLOGY

## 2021-03-09 PROCEDURE — 17004 DESTROY PREMAL LESIONS 15/>: CPT | Performed by: DERMATOLOGY

## 2021-03-09 ASSESSMENT — FIBROSIS 4 INDEX: FIB4 SCORE: 1.17

## 2021-03-09 NOTE — PROGRESS NOTES
DERMATOLOGY NOTE  NEW VISIT       Chief complaint: Establish Care and Skin Lesion    Patient here today for IGLESIA. Has had AKs on hands, arms and face previously treated with cryo and Efudex. She would like her neck examined as she feels some patchy lesions for around 1 year.     History of skin cancer: No  History of precancers/actinic keratoses: Yes, Details: hands, arms, treated with liquid nitrogen and Efudex on forhead and arms  History of biopsies:No  History of blistering/severe sunburns:Yes, Details: childhood  Family history of skin cancer:Yes, Details: father SCC, brother melanoma  Family history of atypical moles:No    Past Medical History:   Diagnosis Date   • Chickenpox    • Bangl[place] measles         Family History   Problem Relation Age of Onset   • Hypertension Mother    • Lung Disease Father    • Cancer Father         lung   • Cancer Brother         MM   • Sleep Apnea Neg Hx    • Heart Attack Neg Hx         Social History     Socioeconomic History   • Marital status:      Spouse name: Not on file   • Number of children: Not on file   • Years of education: Not on file   • Highest education level: Not on file   Occupational History   • Not on file   Tobacco Use   • Smoking status: Never Smoker   • Smokeless tobacco: Never Used   Substance and Sexual Activity   • Alcohol use: Yes     Comment: 1 a week    • Drug use: Never   • Sexual activity: Yes     Partners: Male   Other Topics Concern   • Not on file   Social History Narrative   • Not on file     Social Determinants of Health     Financial Resource Strain:    • Difficulty of Paying Living Expenses:    Food Insecurity:    • Worried About Running Out of Food in the Last Year:    • Ran Out of Food in the Last Year:    Transportation Needs:    • Lack of Transportation (Medical):    • Lack of Transportation (Non-Medical):    Physical Activity:    • Days of Exercise per Week:    • Minutes of Exercise per Session:    Stress:    • Feeling of Stress :   "  Social Connections:    • Frequency of Communication with Friends and Family:    • Frequency of Social Gatherings with Friends and Family:    • Attends Sabianism Services:    • Active Member of Clubs or Organizations:    • Attends Club or Organization Meetings:    • Marital Status:    Intimate Partner Violence:    • Fear of Current or Ex-Partner:    • Emotionally Abused:    • Physically Abused:    • Sexually Abused:         Allergies   Allergen Reactions   • Ampicillin      rash   • Biaxin [Clarithromycin]      rash   • Sulfa Drugs      rash        MEDICATIONS:  Medications relevant to specialty reviewed.    Current Outpatient Medications:   •  losartan (COZAAR) 25 MG Tab, Take 1 Tab by mouth every day., Disp: 90 Tab, Rfl: 3  •  gabapentin (NEURONTIN) 100 MG Cap, TAKE 1 CAPSULE BY MOUTH THREE TIMES A DAY, Disp: 270 Cap, Rfl: 3  •  Multiple Vitamins-Minerals (MULTIVITAMIN ADULT PO), Take  by mouth., Disp: , Rfl:   •  Calcium Carb-Cholecalciferol (CALCIUM + D3 PO), Take  by mouth., Disp: , Rfl:   •  aspirin (ASA) 81 MG Chew Tab chewable tablet, Take 81 mg by mouth every day., Disp: , Rfl:      REVIEW OF SYSTEMS:   Positive for skin (see HPI)  Negative for fevers, chills and cough       EXAM:  Temp 36.6 °C (97.8 °F) (Temporal)   Ht 1.727 m (5' 8\")   Wt 81.6 kg (180 lb)   BMI 27.37 kg/m²   Constitutional: Well-developed, well-nourished, and in no distress.     A total body skin exam was performed excluding the genitals per patient preference and including the following areas: head (including face), neck, chest, abdomen, groin/buttocks, back, bilateral upper extremities, and bilateral lower extremities with the following pertinent findings listed below and/or in assessment/plan.       IMPRESSION / PLAN:    1. Actinic keratoses  Exam: face, chest, neck, hands and forearms with many pink gritty papules and diffuse underlying actinic damage on neck and arms/hands  - Discussed precancerous nature of the condition, " relationship to ultraviolet light exposure and need for safe sun practices and daily broad spectrum sun protection.   - Discussed treatment options including topical therapies, cryotherapy and photodynamic therapy.   - After discussing treatment options, patient wishes to pursue cryotherapy to thicker lesions today and then field treatment as below. Patient was advised that if actinic keratoses persist at one month after treatment, should come in for further evaluation and possible biopsy.     Procedure Note: Informed verbal consent including risk of redness, swelling, pain, blistering, scar, bleeding, infection and need for possible further treatment was obtained. The location(s) was/were identified by the patient and the physician. The lesion(s) was/were treated with cryotherapy for a total of  freeze/thaw cycles with the open spray technique. The patient tolerated the procedure well.  Wound care was discussed.   Location: as noted in exam  Total # of lesions treated:  25    - Patient to start 5FU/dovonex 5/0.005% cream BID to the AA of the NECK 7-10 days  and HANDS/FOREARMS 10-14 days (pending response). AVOID eye area. Side effects of cream (significant irritation, erythema, scaling, itching, weeping, crusting, pain) discussed.  - Patient instructed to protect the skin from the sun.  - Aquaphor for itching/small open sores  - Patient was instructed to call me at any point if any concerns about proceeding with treatment or treatment reaction    2. Lentigines  Exam: -sun exposed skin of trunk and b/l upper and lower extremities with scattered clinically benign light brown reticulated macules all of which were morphologically similar and none of which were suspicious for skin cancer today   - Discussed benign nature of lesions, related to sun exposure  - Discussed sun protection    3. Multiple melanocytic nevi  Exam: trunk and extremities with scattered light and medium brown uniform macules and papules all of  "which were morphologically similar and with benign-appearing pigment network patterns on dermoscopy   -Reviewed moles and melanoma. Patient advised to return sooner than scheduled if concerning changes in moles are noted.  -Reviewed sun protective behavior including sunscreen application and reapplication, appropriate choice of SPF, hat, protective clothing, seeking shade and never choosing to \"lie out\" in the sun.    4. Family history of melanoma  -Reviewed moles and melanoma  -Recommend regular IGLESIA     Return to clinic in: Return in about 3 months (around 6/9/2021) for AK field tx follow up. and as needed for any new or changing skin lesions.    Stacia Cody M.D.    "

## 2021-03-11 ENCOUNTER — IMMUNIZATION (OUTPATIENT)
Dept: FAMILY PLANNING/WOMEN'S HEALTH CLINIC | Facility: IMMUNIZATION CENTER | Age: 66
End: 2021-03-11
Attending: INTERNAL MEDICINE
Payer: MEDICARE

## 2021-03-11 DIAGNOSIS — Z23 ENCOUNTER FOR VACCINATION: Primary | ICD-10-CM

## 2021-03-11 DIAGNOSIS — Z23 NEED FOR VACCINATION: ICD-10-CM

## 2021-03-11 PROCEDURE — 91300 PFIZER SARS-COV-2 VACCINE: CPT

## 2021-03-11 PROCEDURE — 0001A PFIZER SARS-COV-2 VACCINE: CPT

## 2021-04-01 DIAGNOSIS — I10 ESSENTIAL HYPERTENSION: ICD-10-CM

## 2021-04-01 RX ORDER — LOSARTAN POTASSIUM 25 MG/1
25 TABLET ORAL
Qty: 90 TABLET | Refills: 0 | Status: SHIPPED | OUTPATIENT
Start: 2021-04-01 | End: 2021-08-12 | Stop reason: SDUPTHER

## 2021-04-02 ENCOUNTER — IMMUNIZATION (OUTPATIENT)
Dept: FAMILY PLANNING/WOMEN'S HEALTH CLINIC | Facility: IMMUNIZATION CENTER | Age: 66
End: 2021-04-02
Attending: INTERNAL MEDICINE
Payer: MEDICARE

## 2021-04-02 DIAGNOSIS — Z23 ENCOUNTER FOR VACCINATION: Primary | ICD-10-CM

## 2021-04-02 PROCEDURE — 91300 PFIZER SARS-COV-2 VACCINE: CPT

## 2021-04-02 PROCEDURE — 0001A PFIZER SARS-COV-2 VACCINE: CPT

## 2021-05-10 ENCOUNTER — HOSPITAL ENCOUNTER (OUTPATIENT)
Dept: LAB | Facility: MEDICAL CENTER | Age: 66
End: 2021-05-10
Attending: INTERNAL MEDICINE
Payer: MEDICARE

## 2021-05-10 DIAGNOSIS — I42.8 NONISCHEMIC CARDIOMYOPATHY (HCC): ICD-10-CM

## 2021-05-10 LAB
CHOLEST SERPL-MCNC: 193 MG/DL (ref 100–199)
HDLC SERPL-MCNC: 59 MG/DL
LDLC SERPL CALC-MCNC: 105 MG/DL
TRIGL SERPL-MCNC: 147 MG/DL (ref 0–149)

## 2021-05-10 PROCEDURE — 36415 COLL VENOUS BLD VENIPUNCTURE: CPT

## 2021-05-10 PROCEDURE — 80061 LIPID PANEL: CPT

## 2021-05-17 ENCOUNTER — PATIENT MESSAGE (OUTPATIENT)
Dept: HEALTH INFORMATION MANAGEMENT | Facility: OTHER | Age: 66
End: 2021-05-17

## 2021-05-20 ENCOUNTER — OFFICE VISIT (OUTPATIENT)
Dept: CARDIOLOGY | Facility: MEDICAL CENTER | Age: 66
End: 2021-05-20
Payer: MEDICARE

## 2021-05-20 VITALS
HEART RATE: 78 BPM | DIASTOLIC BLOOD PRESSURE: 56 MMHG | WEIGHT: 182 LBS | SYSTOLIC BLOOD PRESSURE: 98 MMHG | HEIGHT: 68 IN | BODY MASS INDEX: 27.58 KG/M2 | OXYGEN SATURATION: 97 %

## 2021-05-20 DIAGNOSIS — E78.00 PURE HYPERCHOLESTEROLEMIA: ICD-10-CM

## 2021-05-20 DIAGNOSIS — I44.7 LBBB (LEFT BUNDLE BRANCH BLOCK): ICD-10-CM

## 2021-05-20 DIAGNOSIS — I42.8 NONISCHEMIC CARDIOMYOPATHY (HCC): ICD-10-CM

## 2021-05-20 PROCEDURE — 99214 OFFICE O/P EST MOD 30 MIN: CPT | Performed by: INTERNAL MEDICINE

## 2021-05-20 ASSESSMENT — ENCOUNTER SYMPTOMS
RESPIRATORY NEGATIVE: 1
BLURRED VISION: 0
MYALGIAS: 0
DIZZINESS: 0
WEIGHT LOSS: 1
GASTROINTESTINAL NEGATIVE: 1
CARDIOVASCULAR NEGATIVE: 1

## 2021-05-20 ASSESSMENT — FIBROSIS 4 INDEX: FIB4 SCORE: 1.17

## 2021-05-20 NOTE — PROGRESS NOTES
Chief Complaint   Patient presents with   • HTN (Controlled)     follow up   History of cardiomyopathy    Subjective:   Karla Villeda is a 66 y.o. female who presents today     The patient moved here from [facility] last year.     She had history of ventricular tachycardia and bradycardia (HR in the 30s) during her chemotherapy (doxyrubicin, vincristine, rituximab and cyclophosphamide) for non-Hodgkin's lymphoma in 2012.  Cardiac work-up at that time was normal.   The arrhythmias lasted for a few days without any recurrences since.     In November 2017 she was noted to have left bundle branch block and atypical chest pain.  Subsequent cardiac catheterization in February 2018 showed no evidence of coronary artery disease.   Echocardiography reportedly showed mildly reduced left ventricular systolic function with ejection fraction of 40 to 45%.     She has been on low dose carvedilol since.     Last ECHO over a year ago. EF 65%     Had another ECHO in January;  Normal left ventricular size, wall thickness, and systolic function.  Normal right ventricular size and systolic function.  Mild aortic insufficiency.  Estimated right ventricular systolic pressure  is 25 mmHg; normal.  Normal pericardium without effusion.  Ascending aorta diameter is 3.9 cm; borderline dilated.     We stopped her carvedilol (was on 3.125 mg BID) after that. FGelt slightly more SB a=for a month but back to normal now.  Also stopped atorvastatin (was on 10 mg/d)  LDL went from 53 to 105  Loss about 10 lbs since July    Past Medical History:   Diagnosis Date   • Chickenpox    • Fris[place] measles      Past Surgical History:   Procedure Laterality Date   • LUMBAR LAMINECTOMY DISKECTOMY     • LYMPH NODE EXCISION     • TONSILLECTOMY     • TONSILLECTOMY AND ADENOIDECTOMY     • TUBAL COAGULATION LAPAROSCOPIC BILATERAL       Family History   Problem Relation Age of Onset   • Hypertension Mother    • Lung Disease Father    • Cancer  Father         lung   • Cancer Brother         MM   • Sleep Apnea Neg Hx    • Heart Attack Neg Hx      Social History     Socioeconomic History   • Marital status:      Spouse name: Not on file   • Number of children: Not on file   • Years of education: Not on file   • Highest education level: Not on file   Occupational History   • Not on file   Tobacco Use   • Smoking status: Never Smoker   • Smokeless tobacco: Never Used   Vaping Use   • Vaping Use: Never used   Substance and Sexual Activity   • Alcohol use: Yes     Comment: 1 a week    • Drug use: Never   • Sexual activity: Yes     Partners: Male   Other Topics Concern   • Not on file   Social History Narrative   • Not on file     Social Determinants of Health     Financial Resource Strain:    • Difficulty of Paying Living Expenses:    Food Insecurity:    • Worried About Running Out of Food in the Last Year:    • Ran Out of Food in the Last Year:    Transportation Needs:    • Lack of Transportation (Medical):    • Lack of Transportation (Non-Medical):    Physical Activity:    • Days of Exercise per Week:    • Minutes of Exercise per Session:    Stress:    • Feeling of Stress :    Social Connections:    • Frequency of Communication with Friends and Family:    • Frequency of Social Gatherings with Friends and Family:    • Attends Mosque Services:    • Active Member of Clubs or Organizations:    • Attends Club or Organization Meetings:    • Marital Status:    Intimate Partner Violence:    • Fear of Current or Ex-Partner:    • Emotionally Abused:    • Physically Abused:    • Sexually Abused:      Allergies   Allergen Reactions   • Ampicillin      rash   • Biaxin [Clarithromycin]      rash   • Sulfa Drugs      rash     Outpatient Encounter Medications as of 5/20/2021   Medication Sig Dispense Refill   • losartan (COZAAR) 25 MG Tab Take 1 tablet by mouth every day. 90 tablet 0   • gabapentin (NEURONTIN) 100 MG Cap TAKE 1 CAPSULE BY MOUTH THREE TIMES A   "Cap 3   • Multiple Vitamins-Minerals (MULTIVITAMIN ADULT PO) Take  by mouth.     • Calcium Carb-Cholecalciferol (CALCIUM + D3 PO) Take  by mouth.     • aspirin (ASA) 81 MG Chew Tab chewable tablet Take 81 mg by mouth every day.       No facility-administered encounter medications on file as of 5/20/2021.     Review of Systems   Constitutional: Positive for weight loss.   HENT: Negative for congestion.    Eyes: Negative for blurred vision.   Respiratory: Negative.    Cardiovascular: Negative.    Gastrointestinal: Negative.    Musculoskeletal: Negative for myalgias.   Neurological: Negative for dizziness.        Objective:   BP (!) 98/56 (BP Location: Left arm, Patient Position: Sitting)   Pulse 78   Ht 1.727 m (5' 8\")   Wt 82.6 kg (182 lb)   SpO2 97%   BMI 27.67 kg/m²     Physical Exam   Constitutional: She is oriented to person, place, and time. She appears well-developed.   Neck: No JVD present.   Cardiovascular: Normal rate and regular rhythm. Exam reveals no gallop.   No murmur heard.  Pulmonary/Chest: Effort normal and breath sounds normal. No respiratory distress. She has no wheezes. She has no rales.   Abdominal: Soft. She exhibits no distension. There is no abdominal tenderness.   Musculoskeletal:      Right lower leg: No edema.      Left lower leg: No edema.   Neurological: She is alert and oriented to person, place, and time.   Skin: Skin is warm and dry. No erythema.   Psychiatric: Her behavior is normal.       Assessment:     1. LBBB (left bundle branch block)     2. Pure hypercholesterolemia  Lipid Profile   3. Nonischemic cardiomyopathy (HCC)         Medical Decision Making:  Today's Assessment / Status / Plan:     The patient's above cardiovascular conditions are stable.   Her LDL has increased quite a bit and now slightly above 100 mg/dL.  She had no evidence of coronary artery disease on coronary angiography a couple of years ago  Will try more diet control and exercise with plan to repeat lipid " in 6 months.   Will continue current medications and have the patient return for a followup in 6 months. Will be happy to see the patient sooner as needed. Thank you for allowing me to participate in the caring of this patient.

## 2021-06-09 ENCOUNTER — OFFICE VISIT (OUTPATIENT)
Dept: DERMATOLOGY | Facility: IMAGING CENTER | Age: 66
End: 2021-06-09
Payer: MEDICARE

## 2021-06-09 DIAGNOSIS — L57.0 ACTINIC KERATOSES: ICD-10-CM

## 2021-06-09 PROCEDURE — 17000 DESTRUCT PREMALG LESION: CPT | Performed by: NURSE PRACTITIONER

## 2021-06-09 PROCEDURE — 17003 DESTRUCT PREMALG LES 2-14: CPT | Performed by: NURSE PRACTITIONER

## 2021-06-09 PROCEDURE — 99999 PR NO CHARGE: CPT | Performed by: NURSE PRACTITIONER

## 2021-06-09 NOTE — PROGRESS NOTES
DERMATOLOGY NOTE  FOLLOW UP VISIT       Chief complaint: Follow-Up and Actinic Keratosis     Here today for AK follow up post efudex treatment to forearms. Did not do chest or neck as she would like to wait until fall.   Patient reports feeling less of the gritty rough spots on arms.   Face treated with LN at visit with Dr. Cody on 3/9/2021    History of skin cancer: No  History of precancers/actinic keratoses: Yes, Details: hands, arms, treated with liquid nitrogen and Efudex on forhead and arms  History of biopsies:No  History of blistering/severe sunburns:Yes, Details: childhood  Family history of skin cancer:Yes, Details: father SCC, brother melanoma  Family history of atypical moles:No    Allergies   Allergen Reactions   • Ampicillin      rash   • Biaxin [Clarithromycin]      rash   • Sulfa Drugs      rash        MEDICATIONS:  Medications relevant to specialty reviewed.     REVIEW OF SYSTEMS:   Positive for skin (see HPI)  Negative for fevers and chills       EXAM:  There were no vitals taken for this visit.  Constitutional: Well-developed, well-nourished, and in no distress.     A focused skin exam was performed including the affected areas of the head (including face), neck, chest and bilateral upper extremities. Notable findings on exam today listed below and/or in assessment/plan.     Residual AKs noted to forehead, nose, left cheek, forearms and hands. AKs persist to neck and chest    IMPRESSION / PLAN:    1. Actinic keratoses  CRYOTHERAPY:  Risks (including, but not limited to: hypo or hyperpigmentation, redness, blister, blood blister, recurrence, need for further treatment, infection, scar) and benefits of cryotherapy discussed. Patient verbally agreed to proceed with treatment. 1 cryotherapy freeze thaw cycles of 10 seconds were applied to 10 lesions on forehead, nose, cheek, chest and nape of neck with cryac. Patient tolerated procedure well. Aftercare instructions given.    Advised to re-start  efudex/dovenex to forearms and hands in 1-2 months  Pt will apply efudex/dovenex to neck, chest in late September.  Re-check in October  Discussed PDT for face  F/u for any non-healing skin lesion or worsening.      Please note that this dictation was created using voice recognition software. I have made every reasonable attempt to correct obvious errors, but I expect that there are errors of grammar and possibly content that I did not discover before finalizing the note.      Return to clinic in: Return for october for post efudex re-check . and as needed for any new or changing skin lesions.

## 2021-07-19 ENCOUNTER — TELEPHONE (OUTPATIENT)
Dept: MEDICAL GROUP | Facility: MEDICAL CENTER | Age: 66
End: 2021-07-19

## 2021-07-19 NOTE — TELEPHONE ENCOUNTER
ESTABLISHED PATIENT PRE-VISIT PLANNING     Patient was NOT contacted to complete PVP.     Note: Patient will not be contacted if there is no indication to call.     1.  Reviewed notes from the last few office visits within the medical group: Yes    2.  If any orders were placed at last visit or intended to be done for this visit (i.e. 6 mos follow-up), do we have Results/Consult Notes?         •  Labs - Labs were not ordered at last office visit.  Note: If patient appointment is for lab review and patient did not complete labs, check with provider if OK to reschedule patient until labs completed.       •  Imaging - Imaging was not ordered at last office visit.       •  Referrals - No referrals were ordered at last office visit.    3. Is this appointment scheduled as a Hospital Follow-Up? No    4.    AHA (Pulse[employer]) form printed for Provider? Email sent to [facility] requesting form

## 2021-07-27 ENCOUNTER — TELEPHONE (OUTPATIENT)
Dept: MEDICAL GROUP | Facility: MEDICAL CENTER | Age: 66
End: 2021-07-27

## 2021-07-27 NOTE — TELEPHONE ENCOUNTER
ESTABLISHED PATIENT PRE-VISIT PLANNING     Patient was NOT contacted to complete PVP.     Note: Patient will not be contacted if there is no indication to call.     1.  Reviewed notes from the last few office visits within the medical group: Yes    2.  If any orders were placed at last visit or intended to be done for this visit (i.e. 6 mos follow-up), do we have Results/Consult Notes?         •  Labs - Labs were not ordered at last office visit.  Note: If patient appointment is for lab review and patient did not complete labs, check with provider if OK to reschedule patient until labs completed.       •  Imaging - Imaging was not ordered at last office visit.       •  Referrals - Referral ordered, patient was seen and consult notes are in chart. Care Teams updated  YES.    3. Is this appointment scheduled as a Hospital Follow-Up? No    4.  Immunizations were updated in Epic using Reconcile Outside Information activity? Yes    5.  Patient is due for the following Health Maintenance Topics:   Health Maintenance Due   Topic Date Due   • Annual Wellness Visit  Never done   • IMM PNEUMOCOCCAL VACCINE: 65+ Years (1 of 2 - PPSV23) Never done       - Patient is up-to-date on all Health Maintenance topics. No records have been requested at this time.    6.  AHA (Pulse8) form printed for Provider? Yes

## 2021-07-29 ENCOUNTER — PATIENT OUTREACH (OUTPATIENT)
Dept: HEALTH INFORMATION MANAGEMENT | Facility: OTHER | Age: 66
End: 2021-07-29

## 2021-07-29 NOTE — NON-PROVIDER
Member called in to update address. Verified HIPAA. Member's address was current with both [facility] and [facility] and there was nothing to update. Used Epic and [employer]

## 2021-08-12 ENCOUNTER — OFFICE VISIT (OUTPATIENT)
Dept: MEDICAL GROUP | Facility: MEDICAL CENTER | Age: 66
End: 2021-08-12
Payer: MEDICARE

## 2021-08-12 VITALS
SYSTOLIC BLOOD PRESSURE: 132 MMHG | WEIGHT: 182 LBS | TEMPERATURE: 98.2 F | HEIGHT: 68 IN | OXYGEN SATURATION: 95 % | RESPIRATION RATE: 16 BRPM | HEART RATE: 97 BPM | DIASTOLIC BLOOD PRESSURE: 74 MMHG | BODY MASS INDEX: 27.58 KG/M2

## 2021-08-12 DIAGNOSIS — Z85.72 HISTORY OF B-CELL LYMPHOMA: ICD-10-CM

## 2021-08-12 DIAGNOSIS — I44.7 LBBB (LEFT BUNDLE BRANCH BLOCK): ICD-10-CM

## 2021-08-12 DIAGNOSIS — G62.0 CHEMOTHERAPY-INDUCED PERIPHERAL NEUROPATHY (HCC): ICD-10-CM

## 2021-08-12 DIAGNOSIS — T45.1X5A CHEMOTHERAPY-INDUCED PERIPHERAL NEUROPATHY (HCC): ICD-10-CM

## 2021-08-12 DIAGNOSIS — Z86.19 HISTORY OF COCCIDIOIDOMYCOSIS: ICD-10-CM

## 2021-08-12 DIAGNOSIS — I10 ESSENTIAL HYPERTENSION: ICD-10-CM

## 2021-08-12 DIAGNOSIS — G47.33 OSA ON CPAP: ICD-10-CM

## 2021-08-12 DIAGNOSIS — Z00.00 MEDICARE ANNUAL WELLNESS VISIT, SUBSEQUENT: ICD-10-CM

## 2021-08-12 PROBLEM — C85.14: Status: RESOLVED | Noted: 2020-01-07 | Resolved: 2021-08-12

## 2021-08-12 PROBLEM — G62.9 NEUROPATHY: Status: RESOLVED | Noted: 2020-01-07 | Resolved: 2021-08-12

## 2021-08-12 PROCEDURE — G0438 PPPS, INITIAL VISIT: HCPCS | Performed by: FAMILY MEDICINE

## 2021-08-12 RX ORDER — LOSARTAN POTASSIUM 25 MG/1
25 TABLET ORAL
Qty: 90 TABLET | Refills: 3 | Status: SHIPPED | OUTPATIENT
Start: 2021-08-12 | End: 2022-06-29 | Stop reason: SDUPTHER

## 2021-08-12 ASSESSMENT — FIBROSIS 4 INDEX: FIB4 SCORE: 1.17

## 2021-08-12 NOTE — PROGRESS NOTES
Chief Complaint   Patient presents with   • Annual Wellness Visit       HPI:  Karla Villeda is a 66 y.o. 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 90 Tablet 3   • gabapentin (NEURONTIN) 100 MG Cap TAKE 1 CAPSULE BY MOUTH THREE TIMES A  Cap 3   • Multiple Vitamins-Minerals (MULTIVITAMIN ADULT PO) Take  by mouth.     • Calcium Carb-Cholecalciferol (CALCIUM + D3 PO) Take  by mouth.     • aspirin (ASA) 81 MG Chew Tab chewable tablet Take 81 mg by mouth every day.       No current facility-administered medications for this visit.          Current supplements as per medication list.     Allergies: Ampicillin, Biaxin [clarithromycin], and Sulfa drugs    Current social contact/activities: she is active with family     She  reports that she has never smoked. She has never used smokeless tobacco. She reports current alcohol use. She reports that she does not use drugs.  Counseling given: Yes      DPA/Advanced Directive:  Patient has Advanced Directive, Living Will and Durable Power of , but it is not on file. Instructed to bring in a copy to scan into their chart.    ROS:    Gait: Uses no assistive device  Ostomy: No  Other tubes: No  Amputations: No  Chronic oxygen use: No  Last eye exam: 2 months ago, good  Wears hearing aids: No   : Denies any urinary leakage during the last 6 months    Screening:  Up to date    Depression Screening    Little interest or pleasure in doing things?   0  Feeling down, depressed , or hopeless?  0  Trouble falling or  staying asleep, or sleeping too much?   0  Feeling tired or having little energy?   0  Poor appetite or overeating?   0  Feeling bad about yourself - or that you are a failure or have let yourself or your family down?  0  Trouble concentrating on things, such as reading the newspaper or watching television?  0  Moving or speaking so slowly that other people could have noticed.  Or the opposite - being so fidgety or restless that you have been moving around a lot more than usual?   0  Thoughts that you would be better off dead, or of hurting yourself?   0  Patient Health Questionnaire Score:  0    If depressive symptoms identified deferred to follow up visit unless specifically addressed in assessment and plan.    Interpretation of PHQ-9 Total Score   Score Severity   1-4 No Depression   5-9 Mild Depression   10-14 Moderate Depression   15-19 Moderately Severe Depression   20-27 Severe Depression      Screening for Cognitive Impairment    Three Minute Recall (captain, garden, picture)   3/3    Edgardo clock face with all 12 numbers and set the hands to show 5 past 8.    yes 5/5    Cognitive concerns identified deferred for follow up unless specifically addressed in assessment and plan.    Fall Risk Assessment    Has the patient had two or more falls in the last year or any fall with injury in the last year?  No    Safety Assessment    Throw rugs on floor.   no  Handrails on all stairs.   yes  Good lighting in all hallways.   yes  Difficulty hearing.   no, recent hearing test good  Patient counseled about all safety risks that were identified.    Functional Assessment ADLs    Are there any barriers preventing you from cooking for yourself or meeting nutritional needs?   .no    Are there any barriers preventing you from driving safely or obtaining transportation?   no.    Are there any barriers preventing you from using a telephone or calling for help?    no    Are there any barriers preventing you from shopping?   no.     Are there any barriers preventing you from taking care of your own finances?   .    Are there any barriers preventing you from managing your medications?   .no    Are there any barriers preventing you from showering, bathing or dressing yourself?  no.    Are you currently engaging in any exercise or physical activity?   .no     What is your perception of your health?   .good    Health Maintenance Summary                IMM DTaP/Tdap/Td Vaccine Postponed 6/15/2028 Originally 2/20/1975. Patient Refused    IMM INFLUENZA Next Due 9/1/2021      Done 8/27/2020 Imm Admin: Influenza Vaccine Quad Inj (Pf)     Patient has more history with this topic...    IMM PNEUMOCOCCAL VACCINE: 65+ Years Next Due 9/28/2021      Done 8/3/2021 Imm Admin: Pneumococcal Conjugate Vaccine (Prevnar/PCV-13)    MAMMOGRAM Next Due 11/10/2021      Done 11/10/2020 MA-SCREENING MAMMO BILAT W/TOMOSYNTHESIS W/CAD     Patient has more history with this topic...    PAP SMEAR Next Due 6/12/2022      Done 6/12/2019     BONE DENSITY Next Due 11/10/2025      Done 11/10/2020 DS-BONE DENSITY STUDY (DEXA)    COLORECTAL CANCER SCREENING Next Due 12/4/2029           Patient Care Team:  ERIKA Soto as PCP - General (Internal Medicine)      Social History     Tobacco Use   • Smoking status: Never Smoker   • Smokeless tobacco: Never Used   Vaping Use   • Vaping Use: Never used   Substance Use Topics   • Alcohol use: Yes     Comment: 1 a week    • Drug use: Never     Family History   Problem Relation Age of Onset   • Hypertension Mother    • Lung Disease Father    • Cancer Father         lung   • Cancer Brother         melanoma   • Cancer Brother         liver, testicular   • Sleep Apnea Neg Hx    • Heart Attack Neg Hx      She  has a past medical history of 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   Past Surgical History:   Procedure Laterality Date   • LUMBAR  "LAMINECTOMY DISKECTOMY     • LYMPH NODE EXCISION     • TONSILLECTOMY     • TONSILLECTOMY AND ADENOIDECTOMY     • TUBAL COAGULATION LAPAROSCOPIC BILATERAL         Exam:   /74 (BP Location: Right arm, Patient Position: Sitting, BP Cuff Size: Adult)   Pulse 97   Temp 36.8 °C (98.2 °F) (Temporal)   Resp 16   Ht 1.727 m (5' 8\")   Wt 82.6 kg (182 lb)   SpO2 95%  Body mass index is 27.67 kg/m².    Hearing good.    Dentition not examined.  Patient, physician and staff all wearing masks.  Alert, oriented in no acute distress.   Eye contact is good, speech goal directed, affect calm  HEENT:   EOMI, PERRLA.     Neck:       Full range of motion. No JVD or carotid bruits appreciated. No cervical adenopathy appreciated. No thyromegaly or neck masses appreciated.  No retractions appreciated.  Lungs:     Clear to auscultation A&P with good air movement.   Heart:      Regular rate and rhythm normal S1 and S2 without murmur appreciated.  Strong and symmetric radial and DP pulses.  Abd:        Soft, bowel sounds positive, nontender. No bloating noted. No hepatosplenomegaly or mass appreciated. No pulsatile mass appreciated.  Ext:         Extremities show symmetric and full range of motion with normal strength. No cyanosis or clubbing appreciated. No peripheral edema appreciated.  Neuro:    Gait is normal. Patient is lucid, fluent and appropriate. No significant tremor appreciated.  Skin:       Exhibit no rashes, pigmented lesions or ulcerations.      Assessment and Plan. The following treatment and monitoring plan is recommended:      1. Medicare annual wellness visit, subsequent  Her medical problems are generally stable    2. Essential hypertension  HTN - Chronic condition stable. Currently taking all meds as directed.   She is taking baby aspirin daily.   She is not monitoring BP at home.   Denies symptoms low BP: light-headed, tunnel-vision, unusual fatigue.   Denies symptoms high BP:pounding headache, visual changes, " palpitations, flushed face.   Denies medicine side effects: unusual fatigue, slow heartbeat, foot/leg swelling, cough.  - losartan (COZAAR) 25 MG Tab; Take 1 Tablet by mouth every day.  Dispense: 90 Tablet; Refill: 3  - Comp Metabolic Panel; Future  - Lipid Profile; Future    3. LBBB (left bundle branch block)  Patient has no history of cardiovascular event or cerebrovascular event.  She does have left bundle branch block and very mild dyslipidemia with an excellent risk ratio.  She has followed in the past with Dr. Del Cid.  She has an establishing appointment coming up with Dr. Simpson.  Denies chest pain, chest pressure, palpitations or exertional shortness of breath.  She was on statins in the past but cardiology felt this was no longer needed.  Stable problem.    4. History of B-cell lymphoma  This was many years ago in 2012.  She was followed carefully by oncology for 5 years and was felt to have gone into complete remission.  Follow-up lab orders discussed and placed.  Patient has no night sweats, unusual weight loss or any masses noted at this time.  She has no symptoms of recurrence, stable problem.  - CBC WITH DIFFERENTIAL; Future  - LDH; Future    5. Chemotherapy-induced peripheral neuropathy (HCC)  Patient had a very heavy chemotherapy for her B-cell lymphoma.  She has Adriamycin, rituximab, vincristine and Cytoxan.  She also had radiation.  This was very successful but did cause some neuropathy.  Initially this was affecting both feet and hands and now what she has is some residual neuropathy in her fingers only.  She states her gabapentin regimen continues to be very helpful and this is not troublesome.  Stable problem at this time.    6. LAKIA on CPAP  Patient is compliant with CPAP.  She has established with pulmonology here last year but currently has plenty of supplies and feels she is doing well.  Stable problem.  - CBC WITH DIFFERENTIAL; Future    7. History of coccidioidomycosis  Patient has  a remote history of Coccidioides pneumonia in 1998.  This was successfully treated with a month of Diflucan and she has had no residual.  Denies cough, sweats or hemoptysis.  Stable problem.      Services suggested: No services needed at this time  Health Care Screening: Age-appropriate preventive services recommended by USPTF and ACIP covered by Medicare were discussed today. Services ordered if indicated and agreed upon by the patient.  Referrals offered: Community-based lifestyle interventions to reduce health risks and promote self-management and wellness, fall prevention, nutrition, physical activity, tobacco-use cessation, weight loss, and mental health services as per orders if indicated.    Discussion today about general wellness and lifestyle habits: discussed   · Prevent falls and reduce trip hazards; Cautioned about securing or removing rugs.  · Have a working fire alarm and carbon monoxide detector; has  · Engage in regular physical activity and social activities     Follow-up: Return in about 6 months (around 2/12/2022), or if symptoms worsen or fail to improve.    addended today to complete a section.  She is medically stable and doing well.

## 2021-09-22 ENCOUNTER — TELEPHONE (OUTPATIENT)
Dept: MEDICAL GROUP | Facility: MEDICAL CENTER | Age: 66
End: 2021-09-22

## 2021-09-22 NOTE — TELEPHONE ENCOUNTER
ESTABLISHED PATIENT PRE-VISIT PLANNING     Patient was NOT contacted to complete PVP.     Note: Patient will not be contacted if there is no indication to call.     1.  Reviewed notes from the last few office visits within the medical group: Yes    2.  If any orders were placed at last visit or intended to be done for this visit (i.e. 6 mos follow-up), do we have Results/Consult Notes?         •  Labs - Future labs ordered at last office visit with  on 08/12/2021.  Note: If patient appointment is for lab review and patient did not complete labs, check with provider if OK to reschedule patient until labs completed.       •  Imaging - Imaging was not ordered at last office visit.       •  Referrals - No referrals were ordered at last office visit.    3. Is this appointment scheduled as a Hospital Follow-Up? No    4.  Immunizations were updated in Epic using Reconcile Outside Information activity? Yes    5.  Patient is due for the following Health Maintenance Topics:   Health Maintenance Due   Topic Date Due   • IMM INFLUENZA (1) 09/01/2021   • IMM PNEUMOCOCCAL VACCINE: 65+ Years (1 of 2 - PPSV23) 09/28/2021     6.  AHA (Pulse8) form printed for Provider? No, patient does not have any open alerts

## 2021-09-28 ENCOUNTER — OFFICE VISIT (OUTPATIENT)
Dept: MEDICAL GROUP | Facility: MEDICAL CENTER | Age: 66
End: 2021-09-28
Payer: MEDICARE

## 2021-09-28 VITALS
TEMPERATURE: 97.1 F | HEIGHT: 68 IN | HEART RATE: 88 BPM | WEIGHT: 183.64 LBS | SYSTOLIC BLOOD PRESSURE: 112 MMHG | DIASTOLIC BLOOD PRESSURE: 58 MMHG | OXYGEN SATURATION: 97 % | BODY MASS INDEX: 27.83 KG/M2

## 2021-09-28 DIAGNOSIS — G62.9 NEUROPATHY: ICD-10-CM

## 2021-09-28 DIAGNOSIS — M40.293 OTHER KYPHOSIS OF CERVICOTHORACIC REGION: ICD-10-CM

## 2021-09-28 DIAGNOSIS — Z12.31 ENCOUNTER FOR SCREENING MAMMOGRAM FOR BREAST CANCER: ICD-10-CM

## 2021-09-28 DIAGNOSIS — Z23 NEEDS FLU SHOT: ICD-10-CM

## 2021-09-28 DIAGNOSIS — M79.621 PAIN OF RIGHT UPPER ARM: ICD-10-CM

## 2021-09-28 PROCEDURE — G0008 ADMIN INFLUENZA VIRUS VAC: HCPCS | Performed by: FAMILY MEDICINE

## 2021-09-28 PROCEDURE — 99213 OFFICE O/P EST LOW 20 MIN: CPT | Mod: 25 | Performed by: FAMILY MEDICINE

## 2021-09-28 PROCEDURE — 90662 IIV NO PRSV INCREASED AG IM: CPT | Performed by: FAMILY MEDICINE

## 2021-09-28 RX ORDER — GABAPENTIN 100 MG/1
CAPSULE ORAL
Qty: 270 CAPSULE | Refills: 3 | Status: SHIPPED | OUTPATIENT
Start: 2021-09-28 | End: 2022-11-17 | Stop reason: SDUPTHER

## 2021-09-28 ASSESSMENT — FIBROSIS 4 INDEX: FIB4 SCORE: 1.17

## 2021-09-28 NOTE — PROGRESS NOTES
"Chief Complaint   Patient presents with   • Arm Pain     Right upper arm, pain 5 at most       Subjective:     HPI:   Karla Villeda presents today with the followin. Pain of right upper arm  Two months of right upper arm pain, lateral.  The discomfort comes and goes, worse with driving.  Sometimes wakes her out of sleep.  Shoulder x-ray order discussed and placed.    2. Other kyphosis of cervicothoracic region  Lower cervical kyphosis and pain in right arm. X-ray order discussed and placed.    3. Needs flu shot  Administered today, high dose    4. Neuropathy  Post chemo neuropathy.    Mammogram order discussed and placed    Patient Active Problem List    Diagnosis Date Noted   • History of B-cell lymphoma 2021   • Chemotherapy-induced peripheral neuropathy (HCC) 2021   • History of coccidioidomycosis 2021   • Essential hypertension 2020   • LBBB (left bundle branch block) 2020   • LAKIA on CPAP 2020       Current medicines (including changes today)  Current Outpatient Medications   Medication Sig Dispense Refill   • GLUCOSAMINE-CHONDROITIN PO Take  by mouth.     • gabapentin (NEURONTIN) 100 MG Cap TAKE 1 CAPSULE BY MOUTH THREE TIMES A  Capsule 3   • losartan (COZAAR) 25 MG Tab Take 1 Tablet by mouth every day. 90 Tablet 3   • Multiple Vitamins-Minerals (MULTIVITAMIN ADULT PO) Take  by mouth.     • Calcium Carb-Cholecalciferol (CALCIUM + D3 PO) Take  by mouth.     • aspirin (ASA) 81 MG Chew Tab chewable tablet Take 81 mg by mouth every day.       No current facility-administered medications for this visit.       Allergies   Allergen Reactions   • Ampicillin      rash   • Biaxin [Clarithromycin]      rash   • Sulfa Drugs      rash       ROS: As per HPI       Objective:     /58 (BP Location: Right arm, Patient Position: Sitting, BP Cuff Size: Adult)   Pulse 88   Temp 36.2 °C (97.1 °F) (Temporal)   Ht 1.727 m (5' 8\")   Wt 83.3 kg (183 lb 10.3 oz)   " SpO2 97%  Body mass index is 27.92 kg/m².    Physical Exam:  Constitutional: Well-developed and well-nourished. Not diaphoretic. No distress. Lucid and fluent.  Patient, physician and staff all wearing masks.  Skin: Skin is warm and dry. No rash noted.  Head: Atraumatic without lesions.  Eyes: Conjunctivae and extraocular motions are normal. Pupils are equal, round, and reactive to light. No scleral icterus.   Ears:  External ears unremarkable.   Neck: Supple, trachea midline. No thyromegaly present. No cervical or supraclavicular lymphadenopathy. No JVD or carotid bruits appreciated  Cardiovascular: Regular rate and rhythm.  Normal S1, S2 without murmur appreciated.  Chest: Effort normal. Clear to auscultation throughout. No adventitious sounds.   Abdomen: Soft, non tender, and without distention. Active bowel sounds in all four quadrants. No rebound, guarding, masses or hepatosplenomegaly.  Extremities: No cyanosis, clubbing, erythema, nor edema. Right shoulder good ROM.  Tender triceps region posteriorly.    Neurological: Alert and oriented x 3. No tremor appreciated.  Psychiatric:  Behavior, mood, and affect are appropriate.       Assessment and Plan:     66 y.o. female with the following issues:    1. Pain of right upper arm  DX-SHOULDER 2+ RIGHT   2. Other kyphosis of cervicothoracic region  DX-CERVICAL SPINE-2 OR 3 VIEWS   3. Needs flu shot  Influenza Vaccine, High Dose (65+ Only)   4. Neuropathy  gabapentin (NEURONTIN) 100 MG Cap   5. Encounter for screening mammogram for breast cancer  MA-SCREENING MAMMO BILAT W/TOMOSYNTHESIS W/CAD         Followup: Return in about 6 months (around 3/28/2022), or if symptoms worsen or fail to improve.

## 2021-10-01 ENCOUNTER — APPOINTMENT (OUTPATIENT)
Dept: RADIOLOGY | Facility: MEDICAL CENTER | Age: 66
End: 2021-10-01
Attending: FAMILY MEDICINE
Payer: MEDICARE

## 2021-10-01 DIAGNOSIS — M79.621 PAIN OF RIGHT UPPER ARM: ICD-10-CM

## 2021-10-01 DIAGNOSIS — M40.293 OTHER KYPHOSIS OF CERVICOTHORACIC REGION: ICD-10-CM

## 2021-10-01 PROCEDURE — 73030 X-RAY EXAM OF SHOULDER: CPT | Mod: RT

## 2021-10-01 PROCEDURE — 72040 X-RAY EXAM NECK SPINE 2-3 VW: CPT

## 2021-10-13 ENCOUNTER — OFFICE VISIT (OUTPATIENT)
Dept: DERMATOLOGY | Facility: IMAGING CENTER | Age: 66
End: 2021-10-13
Payer: MEDICARE

## 2021-10-13 DIAGNOSIS — L57.0 ACTINIC KERATOSES: ICD-10-CM

## 2021-10-13 PROCEDURE — 17003 DESTRUCT PREMALG LES 2-14: CPT | Performed by: NURSE PRACTITIONER

## 2021-10-13 PROCEDURE — 17000 DESTRUCT PREMALG LESION: CPT | Performed by: NURSE PRACTITIONER

## 2021-10-13 NOTE — PROGRESS NOTES
DERMATOLOGY NOTE  FOLLOW UP VISIT       Chief complaint: Follow-Up and Actinic Keratosis       Efudex treatment to chest and neck completed . Doing well no new concerns           Initial Hx   Here today for AK follow up post efudex treatment to forearms. Did not do chest or neck as she would like to wait until fall.   Patient reports feeling less of the gritty rough spots on arms.   Face treated with LN at visit with Dr. Cody on 3/9/2021    History of skin cancer: No  History of precancers/actinic keratoses: Yes, Details: hands, arms, treated with liquid nitrogen and Efudex on forhead and arms  History of biopsies:No  History of blistering/severe sunburns:Yes, Details: childhood  Family history of skin cancer:Yes, Details: father SCC, brother melanoma  Family history of atypical moles:No        Allergies   Allergen Reactions   • Ampicillin      rash   • Biaxin [Clarithromycin]      rash   • Sulfa Drugs      rash        MEDICATIONS:  Medications relevant to specialty reviewed.     REVIEW OF SYSTEMS:   Positive for skin (see HPI)  Negative for fevers and chills       EXAM:  There were no vitals taken for this visit.  Constitutional: Well-developed, well-nourished, and in no distress.     A focused skin exam was performed including the affected areas of the neck and chest. Notable findings on exam today listed below and/or in assessment/plan.     Two aks at left neck line    IMPRESSION / PLAN:    1. Actinic keratoses  CRYOTHERAPY:  Risks (including, but not limited to: hypo or hyperpigmentation, redness, blister, blood blister, recurrence, need for further treatment, infection, scar) and benefits of cryotherapy discussed. Patient verbally agreed to proceed with treatment. 2 cryotherapy freeze thaw cycles of 10 seconds were applied to 2 lesions on left neckline with cryac. Patient tolerated procedure well. Aftercare instructions given.         Please note that this dictation was created using voice recognition software.  I have made every reasonable attempt to correct obvious errors, but I expect that there are errors of grammar and possibly content that I did not discover before finalizing the note.    I have performed a physical exam and reviewed and updated ROS and Plan today (10/13/2021). In review of dermatology visit (6/9/2021), there are no changes except as documented above.       Return to clinic in: Return for march for pollo. and as needed for any new or changing skin lesions.

## 2021-10-22 ENCOUNTER — HOSPITAL ENCOUNTER (OUTPATIENT)
Facility: MEDICAL CENTER | Age: 66
End: 2021-10-22
Attending: FAMILY MEDICINE
Payer: MEDICARE

## 2021-10-22 ENCOUNTER — TELEPHONE (OUTPATIENT)
Dept: CARDIOLOGY | Facility: MEDICAL CENTER | Age: 66
End: 2021-10-22

## 2021-10-22 ENCOUNTER — OFFICE VISIT (OUTPATIENT)
Dept: URGENT CARE | Facility: CLINIC | Age: 66
End: 2021-10-22
Payer: MEDICARE

## 2021-10-22 VITALS
RESPIRATION RATE: 16 BRPM | TEMPERATURE: 97.5 F | SYSTOLIC BLOOD PRESSURE: 124 MMHG | HEART RATE: 84 BPM | DIASTOLIC BLOOD PRESSURE: 74 MMHG | OXYGEN SATURATION: 97 %

## 2021-10-22 DIAGNOSIS — N30.00 ACUTE CYSTITIS WITHOUT HEMATURIA: ICD-10-CM

## 2021-10-22 LAB
APPEARANCE UR: NORMAL
BILIRUB UR STRIP-MCNC: NEGATIVE MG/DL
COLOR UR AUTO: YELLOW
GLUCOSE UR STRIP.AUTO-MCNC: NEGATIVE MG/DL
KETONES UR STRIP.AUTO-MCNC: NEGATIVE MG/DL
LEUKOCYTE ESTERASE UR QL STRIP.AUTO: NORMAL
NITRITE UR QL STRIP.AUTO: NEGATIVE
PH UR STRIP.AUTO: 5.5 [PH] (ref 5–8)
PROT UR QL STRIP: NEGATIVE MG/DL
RBC UR QL AUTO: NEGATIVE
SP GR UR STRIP.AUTO: 1.03
UROBILINOGEN UR STRIP-MCNC: 0.2 MG/DL

## 2021-10-22 PROCEDURE — 99213 OFFICE O/P EST LOW 20 MIN: CPT | Performed by: FAMILY MEDICINE

## 2021-10-22 PROCEDURE — 87186 SC STD MICRODIL/AGAR DIL: CPT

## 2021-10-22 PROCEDURE — 87077 CULTURE AEROBIC IDENTIFY: CPT

## 2021-10-22 PROCEDURE — 81002 URINALYSIS NONAUTO W/O SCOPE: CPT | Performed by: FAMILY MEDICINE

## 2021-10-22 PROCEDURE — 87086 URINE CULTURE/COLONY COUNT: CPT

## 2021-10-22 RX ORDER — NITROFURANTOIN 25; 75 MG/1; MG/1
100 CAPSULE ORAL 2 TIMES DAILY
Qty: 14 CAPSULE | Refills: 0 | Status: SHIPPED | OUTPATIENT
Start: 2021-10-22 | End: 2021-10-29

## 2021-10-22 NOTE — PROGRESS NOTES
Chief Complaint   Patient presents with   • Urinary Pain     x1day, urinary urgency and odor                DYSURIA    This is a new problem. The current episode started in the past 2 days. The problem occurs constantly. The problem has been unchanged. Associated symptoms include: subj fever,  increased urinary frequency and nausea. Pertinent negatives include no change in bowel habit, chest pain, chills, visual change, vomiting or weakness. Nothing aggravates the symptoms. She has tried nothing for the symptoms.       LMP -  N/a - postmenopausal       Social History     Socioeconomic History   • Marital status:      Spouse name: Not on file   • Number of children: Not on file   • Years of education: Not on file   • Highest education level: Not on file   Occupational History   • Not on file   Tobacco Use   • Smoking status: Never Smoker   • Smokeless tobacco: Never Used   Vaping Use   • Vaping Use: Never used   Substance and Sexual Activity   • Alcohol use: Yes     Comment: 1 a week    • Drug use: Never   • Sexual activity: Yes     Partners: Male   Other Topics Concern   • Not on file   Social History Narrative   • Not on file     Social Determinants of Health     Financial Resource Strain:    • Difficulty of Paying Living Expenses:    Food Insecurity:    • Worried About Running Out of Food in the Last Year:    • Ran Out of Food in the Last Year:    Transportation Needs:    • Lack of Transportation (Medical):    • Lack of Transportation (Non-Medical):    Physical Activity:    • Days of Exercise per Week:    • Minutes of Exercise per Session:    Stress:    • Feeling of Stress :    Social Connections:    • Frequency of Communication with Friends and Family:    • Frequency of Social Gatherings with Friends and Family:    • Attends Baptism Services:    • Active Member of Clubs or Organizations:    • Attends Club or Organization Meetings:    • Marital Status:    Intimate Partner Violence:    • Fear of Current or  Ex-Partner:    • Emotionally Abused:    • Physically Abused:    • Sexually Abused:             Current Outpatient Medications on File Prior to Visit   Medication Sig Dispense Refill   • GLUCOSAMINE-CHONDROITIN PO Take  by mouth.     • gabapentin (NEURONTIN) 100 MG Cap TAKE 1 CAPSULE BY MOUTH THREE TIMES A  Capsule 3   • losartan (COZAAR) 25 MG Tab Take 1 Tablet by mouth every day. 90 Tablet 3   • Multiple Vitamins-Minerals (MULTIVITAMIN ADULT PO) Take  by mouth.     • Calcium Carb-Cholecalciferol (CALCIUM + D3 PO) Take  by mouth.     • aspirin (ASA) 81 MG Chew Tab chewable tablet Take 81 mg by mouth every day.       No current facility-administered medications on file prior to visit.       Review of Systems      HENT: negative for cough, congestion, sore throat  Cardiovascular: Negative for chest pain.   Gastrointestinal: Positive for nausea. Negative for vomiting and change in bowel habit.   Neurological: Negative for weakness, dizziness.          Objective:   /74 (BP Location: Left arm, Patient Position: Sitting, BP Cuff Size: Adult)   Pulse 84   Temp 36.4 °C (97.5 °F) (Temporal)   Resp 16   SpO2 97%       Physical Exam  HEENT - PERRLA, EOMI  Neuro - alert and oriented x3. CN 2-12 grossly intact.  Lungs - CTA. No wheezes, rhonchi or rales.  Heart - regular rate and rhythm without murmur.  Abdomen - soft and non-tender, bowel sounds active x4.   No flank pain  Musculoskeletal - No lower extremity edema noted.         No results found for: POCCOLOR, POCAPPEAR, POCLEUKEST, POCNITRITE, POCUROBILIGE, POCPROTEIN, POCURPH, POCBLOOD, POCSPGRV, POCKETONES, POCBILIRUBIN, POCGLUCUA        Assessment/Plan:     1. Acute cystitis without hematuria  UA c/w UTI    - URINE CULTURE(NEW); Future  - nitrofurantoin (MACROBID) 100 MG Cap; Take 1 Capsule by mouth 2 times a day for 7 days.  Dispense: 14 Capsule; Refill: 0    Follow up in one week if no improvement, sooner if symptoms worsen.

## 2021-10-23 DIAGNOSIS — N30.00 ACUTE CYSTITIS WITHOUT HEMATURIA: ICD-10-CM

## 2021-10-25 ENCOUNTER — HOSPITAL ENCOUNTER (OUTPATIENT)
Dept: LAB | Facility: MEDICAL CENTER | Age: 66
End: 2021-10-25
Attending: FAMILY MEDICINE
Payer: MEDICARE

## 2021-10-25 DIAGNOSIS — I10 ESSENTIAL HYPERTENSION: ICD-10-CM

## 2021-10-25 DIAGNOSIS — Z85.72 HISTORY OF B-CELL LYMPHOMA: ICD-10-CM

## 2021-10-25 DIAGNOSIS — G47.33 OSA ON CPAP: ICD-10-CM

## 2021-10-25 LAB
ALBUMIN SERPL BCP-MCNC: 4.5 G/DL (ref 3.2–4.9)
ALBUMIN/GLOB SERPL: 1.7 G/DL
ALP SERPL-CCNC: 80 U/L (ref 30–99)
ALT SERPL-CCNC: 21 U/L (ref 2–50)
ANION GAP SERPL CALC-SCNC: 12 MMOL/L (ref 7–16)
AST SERPL-CCNC: 20 U/L (ref 12–45)
BACTERIA UR CULT: ABNORMAL
BACTERIA UR CULT: ABNORMAL
BASOPHILS # BLD AUTO: 0.4 % (ref 0–1.8)
BASOPHILS # BLD: 0.03 K/UL (ref 0–0.12)
BILIRUB SERPL-MCNC: 0.8 MG/DL (ref 0.1–1.5)
BUN SERPL-MCNC: 19 MG/DL (ref 8–22)
CALCIUM SERPL-MCNC: 9.5 MG/DL (ref 8.5–10.5)
CHLORIDE SERPL-SCNC: 109 MMOL/L (ref 96–112)
CHOLEST SERPL-MCNC: 198 MG/DL (ref 100–199)
CO2 SERPL-SCNC: 24 MMOL/L (ref 20–33)
CREAT SERPL-MCNC: 0.68 MG/DL (ref 0.5–1.4)
EOSINOPHIL # BLD AUTO: 0.22 K/UL (ref 0–0.51)
EOSINOPHIL NFR BLD: 3.2 % (ref 0–6.9)
ERYTHROCYTE [DISTWIDTH] IN BLOOD BY AUTOMATED COUNT: 46.3 FL (ref 35.9–50)
FASTING STATUS PATIENT QL REPORTED: NORMAL
GLOBULIN SER CALC-MCNC: 2.6 G/DL (ref 1.9–3.5)
GLUCOSE SERPL-MCNC: 89 MG/DL (ref 65–99)
HCT VFR BLD AUTO: 42.9 % (ref 37–47)
HDLC SERPL-MCNC: 67 MG/DL
HGB BLD-MCNC: 13.9 G/DL (ref 12–16)
IMM GRANULOCYTES # BLD AUTO: 0.02 K/UL (ref 0–0.11)
IMM GRANULOCYTES NFR BLD AUTO: 0.3 % (ref 0–0.9)
LDH SERPL L TO P-CCNC: 186 U/L (ref 107–266)
LDLC SERPL CALC-MCNC: 116 MG/DL
LYMPHOCYTES # BLD AUTO: 1.31 K/UL (ref 1–4.8)
LYMPHOCYTES NFR BLD: 19.2 % (ref 22–41)
MCH RBC QN AUTO: 30.2 PG (ref 27–33)
MCHC RBC AUTO-ENTMCNC: 32.4 G/DL (ref 33.6–35)
MCV RBC AUTO: 93.1 FL (ref 81.4–97.8)
MONOCYTES # BLD AUTO: 0.52 K/UL (ref 0–0.85)
MONOCYTES NFR BLD AUTO: 7.6 % (ref 0–13.4)
NEUTROPHILS # BLD AUTO: 4.71 K/UL (ref 2–7.15)
NEUTROPHILS NFR BLD: 69.3 % (ref 44–72)
NRBC # BLD AUTO: 0 K/UL
NRBC BLD-RTO: 0 /100 WBC
PLATELET # BLD AUTO: 214 K/UL (ref 164–446)
PMV BLD AUTO: 9.8 FL (ref 9–12.9)
POTASSIUM SERPL-SCNC: 4 MMOL/L (ref 3.6–5.5)
PROT SERPL-MCNC: 7.1 G/DL (ref 6–8.2)
RBC # BLD AUTO: 4.61 M/UL (ref 4.2–5.4)
SIGNIFICANT IND 70042: ABNORMAL
SITE SITE: ABNORMAL
SODIUM SERPL-SCNC: 145 MMOL/L (ref 135–145)
SOURCE SOURCE: ABNORMAL
TRIGL SERPL-MCNC: 76 MG/DL (ref 0–149)
WBC # BLD AUTO: 6.8 K/UL (ref 4.8–10.8)

## 2021-10-25 PROCEDURE — 80053 COMPREHEN METABOLIC PANEL: CPT

## 2021-10-25 PROCEDURE — 83615 LACTATE (LD) (LDH) ENZYME: CPT

## 2021-10-25 PROCEDURE — 36415 COLL VENOUS BLD VENIPUNCTURE: CPT

## 2021-10-25 PROCEDURE — 85025 COMPLETE CBC W/AUTO DIFF WBC: CPT

## 2021-10-25 PROCEDURE — 80061 LIPID PANEL: CPT

## 2021-11-01 NOTE — PROGRESS NOTES
Subjective:   Chief Complaint:   Chief Complaint   Patient presents with   • Other     F/V Dx: LBBB (left bundle branch block)   • Hypertension     F/V Dx: Essential hypertension       Karla Villeda is a 66 y.o. female who returns today for nonischemic cardiomyopathy/LV dysfunction, left bundle branch block, prior VT and bradycardia during chemotherapy, and today palpitations.    Last seen in the office 5/20/2021 by Dr. Patel.    In 2012, on chemo for  non-Hodgkin's lymphoma, doxorubicin, vincristine, rituximab, cyclophosphamide.  Radiation also to the left chest.  Was having VT and bradycardia, thought to be rituximab, this was stopped and she finished other therapies and did well.    In 2017, eval for CP, ED eval was ok, but ecg with LBBB.    Mild LV dysfunction, EF 40 to 45%, echo in [place].  Cardiac catheterization in 2018 without significant CAD, also in [place].  Started on low EF medications, blood pressure recovered.  Was on low dose coreg and she stopped to simplify her regimen but no major side effects though BP was a little low.  EF Jan 2021 65%.    Has hyperlipidemia, was on statin but she preferred to be off of it.  , HDL ok.    Has LAKIA, CPAP.    Has mild ALMAZAN at times, going up 2-3 flights, not changing, not alarming.  Has intermittent mild ankle edema, gone in the morning.  No orthopnea.    Has mild palpitations, heart will race, lasts seconds, deep breath makes it go away.  Happening sometimes once per day.  Caffeine makes it worse.  No significant lightheadedness, or presyncope/syncope.   Thyroid testing normal January 2021.    Not limited by chest pain, pressure or tightness with activity.   No symptoms of leg claudication.   No stroke/TIA like symptoms.    No prior hypertension.  No family history of premature coronary artery disease.  No prior smoking history.  No history of diabetes.  No history of autoimmune disease such as lupus or rheumatoid arthritis.  No  chronic kidney disease.  No ETOH overuse.  No caffeine overuse.  No recreation substance use.  No hx asthma.    , Paulo not here today.  Live in [place].  Retired.    DATA REVIEWED by me:  ECG (my personal interpretation) 7/10/2020  Sinus, 65, left bundle branch block    Echo 1/21/2021-EF 60%  Normal left ventricular size, wall thickness, and systolic function.  Normal right ventricular size and systolic function.  Mild aortic insufficiency.  Estimated right ventricular systolic pressure  is 25 mmHg; normal.  Normal pericardium without effusion.  Ascending aorta diameter is 3.9 cm; borderline dilated.      No prior study is available for comparison.     Most recent labs:       Lab Results   Component Value Date/Time    WBC 6.8 10/25/2021 08:08 AM    HEMOGLOBIN 13.9 10/25/2021 08:08 AM    HEMATOCRIT 42.9 10/25/2021 08:08 AM    MCV 93.1 10/25/2021 08:08 AM      Lab Results   Component Value Date/Time    SODIUM 145 10/25/2021 08:08 AM    POTASSIUM 4.0 10/25/2021 08:08 AM    CHLORIDE 109 10/25/2021 08:08 AM    CO2 24 10/25/2021 08:08 AM    GLUCOSE 89 10/25/2021 08:08 AM    BUN 19 10/25/2021 08:08 AM    CREATININE 0.68 10/25/2021 08:08 AM      Lab Results   Component Value Date/Time    ASTSGOT 20 10/25/2021 08:08 AM    ALTSGPT 21 10/25/2021 08:08 AM    ALBUMIN 4.5 10/25/2021 08:08 AM      Lab Results   Component Value Date/Time    CHOLSTRLTOT 198 10/25/2021 08:08 AM     (H) 10/25/2021 08:08 AM    HDL 67 10/25/2021 08:08 AM    TRIGLYCERIDE 76 10/25/2021 08:08 AM     No results for input(s): NTPROBNP, TROPONINT in the last 72 hours.      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    In remission   • Chickenpox    • [place]awk measles    • History of B-cell lymphoma 8/12/2021     Past Surgical History:   Procedure Laterality Date   • LUMBAR LAMINECTOMY DISKECTOMY     • LYMPH NODE EXCISION     • TONSILLECTOMY     • TONSILLECTOMY AND ADENOIDECTOMY     • TUBAL  COAGULATION LAPAROSCOPIC BILATERAL       Family History   Problem Relation Age of Onset   • Hypertension Mother    • Lung Disease Father    • Cancer Father         lung   • Cancer Brother         melanoma   • Cancer Brother         liver, testicular   • Sleep Apnea Neg Hx    • Heart Attack Neg Hx      Social History     Socioeconomic History   • Marital status:      Spouse name: Not on file   • Number of children: Not on file   • Years of education: Not on file   • Highest education level: Not on file   Occupational History   • Not on file   Tobacco Use   • Smoking status: Never Smoker   • Smokeless tobacco: Never Used   Vaping Use   • Vaping Use: Never used   Substance and Sexual Activity   • Alcohol use: Yes     Comment: 1 a week    • Drug use: Never   • Sexual activity: Yes     Partners: Male   Other Topics Concern   • Not on file   Social History Narrative   • Not on file     Social Determinants of Health     Financial Resource Strain:    • Difficulty of Paying Living Expenses:    Food Insecurity:    • Worried About Running Out of Food in the Last Year:    • Ran Out of Food in the Last Year:    Transportation Needs:    • Lack of Transportation (Medical):    • Lack of Transportation (Non-Medical):    Physical Activity:    • Days of Exercise per Week:    • Minutes of Exercise per Session:    Stress:    • Feeling of Stress :    Social Connections:    • Frequency of Communication with Friends and Family:    • Frequency of Social Gatherings with Friends and Family:    • Attends [employer] Services:    • Active Member of Clubs or Organizations:    • Attends Club or Organization Meetings:    • Marital Status:    Intimate Partner Violence:    • Fear of Current or Ex-Partner:    • Emotionally Abused:    • Physically Abused:    • Sexually Abused:      Allergies   Allergen Reactions   • Ampicillin      rash   • Biaxin [Clarithromycin]      rash   • Sulfa Drugs      rash       Current Outpatient Medications  "  Medication Sig Dispense Refill   • GLUCOSAMINE-CHONDROITIN PO Take  by mouth.     • gabapentin (NEURONTIN) 100 MG Cap TAKE 1 CAPSULE BY MOUTH THREE TIMES A  Capsule 3   • losartan (COZAAR) 25 MG Tab Take 1 Tablet by mouth every day. 90 Tablet 3   • Multiple Vitamins-Minerals (MULTIVITAMIN ADULT PO) Take  by mouth.     • Calcium Carb-Cholecalciferol (CALCIUM + D3 PO) Take  by mouth.     • aspirin (ASA) 81 MG Chew Tab chewable tablet Take 81 mg by mouth every day.       No current facility-administered medications for this visit.       ROS  All others systems reviewed and negative.     Objective:     /64 (BP Location: Left arm, Patient Position: Sitting, BP Cuff Size: Large adult)   Pulse 87   Resp 14   Ht 1.727 m (5' 8\")   Wt 83.2 kg (183 lb 6.4 oz)   SpO2 96%  Body mass index is 27.89 kg/m².    General: No acute distress. Well nourished.  HEENT: EOM grossly intact, no scleral icterus, no pharyngeal erythema.   Neck:  No JVD, no bruits, trachea midline  CVS: RRR. Normal S1, S2. No M/R/G. No LE edema.  2+ radial pulses, 2+ PT pulses  Resp: CTAB. No wheezing or crackles/rhonchi. Normal respiratory effort.  Abdomen: Soft, NT, no barbara hepatomegaly.  MSK/Ext: No clubbing or cyanosis.  Skin: Warm and dry, no rashes.  Neurological: CN III-XII grossly intact. No focal deficits.   Psych: A&O x 3, appropriate affect, good judgement        Assessment:     1. Nonischemic cardiomyopathy (HCC)     2. Essential hypertension     3. LBBB (left bundle branch block)     4. Pure hypercholesterolemia     5. History of B-cell lymphoma     6. Chemotherapy-induced peripheral neuropathy (HCC)     7. Palpitations  Cardiac Event Monitor   8. Ventricular tachycardia (HCC)     9. LAKIA on CPAP         Medical Decision Making:  Today's Assessment / Status / Plan:     -ALMAZAN not alarming, seems appropriate  -Heart monitor to follow-up on palpitations  -Echocardiogram January 2024, 3 years from prior, can order earlier if she has " any symptoms, see below  -Blood pressure looks good  -No concerns regarding left bundle branch block  -LDL cholesterol is only 116, no other major risk factors  -Risk versus benefit of aspirin is not clear, would be fine if she wanted to discontinue this  -RTC 1 year, see below    Written instructions given today:      -Zio patch heart monitor for approximately 5 days.  You can take it off earlier or keep it on longer.  If you do not have any significant palpitations, consider caffeine to make sure we capture something.  I just want to see what is happening with the heart electrical system    -Echocardiogram January 2024 unless you have progression of symptoms such as worsening shortness of breath or strain swelling around the legs of the abdomen.    -Risk versus benefit of aspirin is not well known.  It would be reasonable to stop the aspirin.      Primary prevention coronary artery disease:    Anyone who lives long enough with develop some degree of coronary artery disease. Blockages in the heart artery should only have a stent or bypass if they are more than 70% blocked (at which point most people have symptoms such as chest pain or unusual shortness of breath with activity that goes away with rest).   People having a sudden heart attack should have a stent placed in the blocked artery.   People having chest pain that limits their ability to function could consider having a stent placed in the artery.  Otherwise, medication and lifestyle changes are the preferred treatments. Stents placed outside of severe symptoms or sudden heart attacks do not reduce mortality (likelihood of dying from a heart attack) and often people end up needing a stent within a stent later.  Lifestyle and medications are more important than stents or bypass under most circumstances.    The only things to do to lower your risk of sudden heart attack (or stroke which is the same disease but a different location- in the brain):    -Take a  "statin medication (as a vascular medication, not just a cholesterol medication) which is our most powerful weapon to stabilize the plaques and reduce inflammation making them less likely to rupture open and cause a sudden heart attack/stroke.  At this time, benefit from a statin for you is not clear.  You do not necessarily need a statin medication at this time.  -Keep LDL cholesterol under 100 (or under 70 if you have already had a heart attack/stroke).  Your LDL cholesterol is 116, diet and exercise first to see if you can get it under 100.  -Control blood pressure, under 130/80, ideally closer to 120/80.  -Control blood sugar, don't get diabetes.  -Don't smoke.  -Eat a heart healthy diet that reduces inflammation: Pritikin, Mediterranean, Vegetarian, Vegan  -Get regular exercise: 150 minutes of moderate exercise OR 75 minutes of very vigorous exercise every week.  -Keep your body mass index under 30, ideal BMI is 20-25.  -Know the signs and symptoms of heart attack and stroke and when to call 9-1-1.  -Shoveling snow can provoke a heart attack because the cold weather constricts heart arteries and heavy/prolonged straining can reduce blood flow down heart arteries.    Signs of a stroke: sudden inability to talk, smile on one side, confusion, inability to move arm/leg on one side, numbness/tingling of arm/leg on one side that is not typical. \"Think FAST.\"  F=face drooping  A=arm weakness  S=Speech difficulty  T= time to call 911 (do not give ride to someone, call ambulance to alert the hospital to start stroke protocol)    Signs of a heart attack: Anything very intense coming from the chest that lasts more than 15 minutes, consider calling 911.  -Pain or pressure in the chest that is intense, lasts more than 15 minutes, not explained by other known reasons such as known/similar heartburn  -It can feel like severe heart burn but associated with nausea, vomiting  -It can be vague pain that radiates to the neck, jaw, " shoulders, arm/arms, wrap around your back or even cause a toothache  -Can be associated with unusual shortness of breath at rest or sense of impending doom      Please look into the following diets:    Mediterranean diet.  Pritikin - used at [facility], probably one of the best for anti-inflammatory  DASH DIET - American Heart Association (mostly for hypertension)  Ornish Diet   Vegan diet (proven to reverse vascular disease)    Resources to learn more:  American Heart Association   Www.Cardiosmart.org, sponsored by the American College of cardiology.    -I will send a [employer] message with the results of your heart monitor    -If your heart monitor is only showing brief atrial runs from the top of the heart, we do not need to do anything.  If the heart palpitations ever start to bother you, we would put you back on a beta-blocker to suppress them to help you feel better.  If I see something from the bottom part of the heart (ventricular tachycardia), I will probably ask you to start a beta-blocker again.      Return in about 1 year (around 11/4/2022).    It is my pleasure to participate in the care of Ms. Mary Lou Villeda.  Please do not hesitate to contact me with questions or concerns.    Yasmin Simpson MD, [facility]  Cardiologist [facility]    Please note that this dictation was created using voice recognition software. I have made every reasonable attempt to correct obvious errors, but it is possible there are errors of grammar and possibly content that I did not discover before finalizing the note.

## 2021-11-04 ENCOUNTER — OFFICE VISIT (OUTPATIENT)
Dept: CARDIOLOGY | Facility: MEDICAL CENTER | Age: 66
End: 2021-11-04
Payer: MEDICARE

## 2021-11-04 VITALS
DIASTOLIC BLOOD PRESSURE: 64 MMHG | RESPIRATION RATE: 14 BRPM | HEIGHT: 68 IN | BODY MASS INDEX: 27.8 KG/M2 | HEART RATE: 87 BPM | SYSTOLIC BLOOD PRESSURE: 102 MMHG | OXYGEN SATURATION: 96 % | WEIGHT: 183.4 LBS

## 2021-11-04 DIAGNOSIS — I47.20 VENTRICULAR TACHYCARDIA (HCC): ICD-10-CM

## 2021-11-04 DIAGNOSIS — I44.7 LBBB (LEFT BUNDLE BRANCH BLOCK): ICD-10-CM

## 2021-11-04 DIAGNOSIS — E78.00 PURE HYPERCHOLESTEROLEMIA: ICD-10-CM

## 2021-11-04 DIAGNOSIS — I42.8 NONISCHEMIC CARDIOMYOPATHY (HCC): ICD-10-CM

## 2021-11-04 DIAGNOSIS — I10 ESSENTIAL HYPERTENSION: ICD-10-CM

## 2021-11-04 DIAGNOSIS — T45.1X5A CHEMOTHERAPY-INDUCED PERIPHERAL NEUROPATHY (HCC): ICD-10-CM

## 2021-11-04 DIAGNOSIS — R00.2 PALPITATIONS: ICD-10-CM

## 2021-11-04 DIAGNOSIS — G62.0 CHEMOTHERAPY-INDUCED PERIPHERAL NEUROPATHY (HCC): ICD-10-CM

## 2021-11-04 DIAGNOSIS — G47.33 OSA ON CPAP: ICD-10-CM

## 2021-11-04 DIAGNOSIS — Z85.72 HISTORY OF B-CELL LYMPHOMA: ICD-10-CM

## 2021-11-04 PROCEDURE — 99214 OFFICE O/P EST MOD 30 MIN: CPT | Performed by: INTERNAL MEDICINE

## 2021-11-04 ASSESSMENT — FIBROSIS 4 INDEX: FIB4 SCORE: 1.33

## 2021-11-04 NOTE — LETTER
[facility]   [address], [address]  [place] [address]  Phone: [phone]  Fax: [phone]              Karla Villeda  1955    Encounter Date: 11/4/2021    Yasmin Simpson M.D.          PROGRESS NOTE:  Subjective:   Chief Complaint:   Chief Complaint   Patient presents with   • Other     F/V Dx: LBBB (left bundle branch block)   • Hypertension     F/V Dx: Essential hypertension       Karla Villeda is a 66 y.o. female who returns today for nonischemic cardiomyopathy/LV dysfunction, left bundle branch block, prior VT and bradycardia during chemotherapy, and today palpitations.    Last seen in the office 5/20/2021 by Dr. Patel.    In 2012, on chemo for  non-Hodgkin's lymphoma, doxorubicin, vincristine, rituximab, cyclophosphamide.  Radiation also to the left chest.  Was having VT and bradycardia, thought to be rituximab, this was stopped and she finished other therapies and did well.    In 2017, eval for CP, ED eval was ok, but ecg with LBBB.    Mild LV dysfunction, EF 40 to 45%, echo in [place].  Cardiac catheterization in 2018 without significant CAD, also in [place].  Started on low EF medications, blood pressure recovered.  Was on low dose coreg and she stopped to simplify her regimen but no major side effects though BP was a little low.  EF Jan 2021 65%.    Has hyperlipidemia, was on statin but she preferred to be off of it.  , HDL ok.    Has mild ALMAZAN at times, going up 2-3 flights, not changing, not alarming.  Has intermittent mild ankle edema, gone in the morning.  No orthopnea.    Has mild palpitations, heart will race, lasts seconds, deep breath makes it go away.  Happening sometimes once per day.  Caffeine makes it worse.  No significant lightheadedness, or presyncope/syncope.   Thyroid testing normal January 2021.    Not limited by chest pain, pressure or tightness with activity.   No symptoms of leg  claudication.   No stroke/TIA like symptoms.    No prior hypertension.  No family history of premature coronary artery disease.  No prior smoking history.  No history of diabetes.  No history of autoimmune disease such as lupus or rheumatoid arthritis.  No chronic kidney disease.  No ETOH overuse.  No caffeine overuse.  No recreation substance use.  No hx asthma.    , Paulo not here today.  Live in [place].  Retired.    DATA REVIEWED by me:  ECG (my personal interpretation) 7/10/2020  Sinus, 64, left bundle branch block    Echo 1/21/2021-EF 60%  Normal left ventricular size, wall thickness, and systolic function.  Normal right ventricular size and systolic function.  Mild aortic insufficiency.  Estimated right ventricular systolic pressure  is 25 mmHg; normal.  Normal pericardium without effusion.  Ascending aorta diameter is 3.9 cm; borderline dilated.      No prior study is available for comparison.     Most recent labs:       Lab Results   Component Value Date/Time    WBC 6.8 10/25/2021 08:08 AM    HEMOGLOBIN 13.9 10/25/2021 08:08 AM    HEMATOCRIT 42.9 10/25/2021 08:08 AM    MCV 93.1 10/25/2021 08:08 AM      Lab Results   Component Value Date/Time    SODIUM 145 10/25/2021 08:08 AM    POTASSIUM 4.0 10/25/2021 08:08 AM    CHLORIDE 109 10/25/2021 08:08 AM    CO2 24 10/25/2021 08:08 AM    GLUCOSE 89 10/25/2021 08:08 AM    BUN 19 10/25/2021 08:08 AM    CREATININE 0.68 10/25/2021 08:08 AM      Lab Results   Component Value Date/Time    ASTSGOT 20 10/25/2021 08:08 AM    ALTSGPT 21 10/25/2021 08:08 AM    ALBUMIN 4.5 10/25/2021 08:08 AM      Lab Results   Component Value Date/Time    CHOLSTRLTOT 198 10/25/2021 08:08 AM     (H) 10/25/2021 08:08 AM    HDL 67 10/25/2021 08:08 AM    TRIGLYCERIDE 76 10/25/2021 08:08 AM     No results for input(s): NTPROBNP, TROPONINT in the last 72 hours.      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    In remission   •  Chickenpox    • Azeri measles    • History of B-cell lymphoma 8/12/2021     Past Surgical History:   Procedure Laterality Date   • LUMBAR LAMINECTOMY DISKECTOMY     • LYMPH NODE EXCISION     • TONSILLECTOMY     • TONSILLECTOMY AND ADENOIDECTOMY     • TUBAL COAGULATION LAPAROSCOPIC BILATERAL       Family History   Problem Relation Age of Onset   • Hypertension Mother    • Lung Disease Father    • Cancer Father         lung   • Cancer Brother         melanoma   • Cancer Brother         liver, testicular   • Sleep Apnea Neg Hx    • Heart Attack Neg Hx      Social History     Socioeconomic History   • Marital status:      Spouse name: Not on file   • Number of children: Not on file   • Years of education: Not on file   • Highest education level: Not on file   Occupational History   • Not on file   Tobacco Use   • Smoking status: Never Smoker   • Smokeless tobacco: Never Used   Vaping Use   • Vaping Use: Never used   Substance and Sexual Activity   • Alcohol use: Yes     Comment: 1 a week    • Drug use: Never   • Sexual activity: Yes     Partners: Male   Other Topics Concern   • Not on file   Social History Narrative   • Not on file     Social Determinants of Health     Financial Resource Strain:    • Difficulty of Paying Living Expenses:    Food Insecurity:    • Worried About Running Out of Food in the Last Year:    • Ran Out of Food in the Last Year:    Transportation Needs:    • Lack of Transportation (Medical):    • Lack of Transportation (Non-Medical):    Physical Activity:    • Days of Exercise per Week:    • Minutes of Exercise per Session:    Stress:    • Feeling of Stress :    Social Connections:    • Frequency of Communication with Friends and Family:    • Frequency of Social Gatherings with Friends and Family:    • Attends Taoist Services:    • Active Member of Clubs or Organizations:    • Attends Club or Organization Meetings:    • Marital Status:    Intimate Partner Violence:    • Fear of Current  "or Ex-Partner:    • Emotionally Abused:    • Physically Abused:    • Sexually Abused:      Allergies   Allergen Reactions   • Ampicillin      rash   • Biaxin [Clarithromycin]      rash   • Sulfa Drugs      rash       Current Outpatient Medications   Medication Sig Dispense Refill   • GLUCOSAMINE-CHONDROITIN PO Take  by mouth.     • gabapentin (NEURONTIN) 100 MG Cap TAKE 1 CAPSULE BY MOUTH THREE TIMES A  Capsule 3   • losartan (COZAAR) 25 MG Tab Take 1 Tablet by mouth every day. 90 Tablet 3   • Multiple Vitamins-Minerals (MULTIVITAMIN ADULT PO) Take  by mouth.     • Calcium Carb-Cholecalciferol (CALCIUM + D3 PO) Take  by mouth.     • aspirin (ASA) 81 MG Chew Tab chewable tablet Take 81 mg by mouth every day.       No current facility-administered medications for this visit.       ROS  All others systems reviewed and negative.     Objective:     /64 (BP Location: Left arm, Patient Position: Sitting, BP Cuff Size: Large adult)   Pulse 87   Resp 14   Ht 1.727 m (5' 8\")   Wt 83.2 kg (183 lb 6.4 oz)   SpO2 96%  Body mass index is 27.89 kg/m².    General: No acute distress. Well nourished.  HEENT: EOM grossly intact, no scleral icterus, no pharyngeal erythema.   Neck:  No JVD, no bruits, trachea midline  CVS: RRR. Normal S1, S2. No M/R/G. No LE edema.  2+ radial pulses, 2+ PT pulses  Resp: CTAB. No wheezing or crackles/rhonchi. Normal respiratory effort.  Abdomen: Soft, NT, no barbara hepatomegaly.  MSK/Ext: No clubbing or cyanosis.  Skin: Warm and dry, no rashes.  Neurological: CN III-XII grossly intact. No focal deficits.   Psych: A&O x 3, appropriate affect, good judgement        Assessment:     1. Nonischemic cardiomyopathy (HCC)     2. Essential hypertension     3. LBBB (left bundle branch block)     4. Pure hypercholesterolemia     5. History of B-cell lymphoma     6. Chemotherapy-induced peripheral neuropathy (HCC)         Medical Decision Making:  Today's Assessment / Status / Plan:     -Heart " monitor to follow-up on palpitations  -Echocardiogram January 2024, 3 years from prior, can order earlier if she has any symptoms, see below  -Blood pressure looks good  -No concerns regarding left bundle branch block  -LDL cholesterol is only 116, no other major risk factors  -Risk versus benefit of aspirin is not clear, would be fine if she wanted to discontinue this  -RTC 1 year, see below    Written instructions given today:      -Zio patch heart monitor for approximately 5 days.  You can take it off earlier or keep it on longer.  If you do not have any significant palpitations, consider caffeine to make sure we capture something.  I just want to see what is happening with the heart electrical system    -Echocardiogram January 2024 unless you have progression of symptoms such as worsening shortness of breath or strain swelling around the legs of the abdomen.    -Risk versus benefit of aspirin is not well known.  It would be reasonable to stop the aspirin.      Primary prevention coronary artery disease:    Anyone who lives long enough with develop some degree of coronary artery disease. Blockages in the heart artery should only have a stent or bypass if they are more than 70% blocked (at which point most people have symptoms such as chest pain or unusual shortness of breath with activity that goes away with rest).   People having a sudden heart attack should have a stent placed in the blocked artery.   People having chest pain that limits their ability to function could consider having a stent placed in the artery.  Otherwise, medication and lifestyle changes are the preferred treatments. Stents placed outside of severe symptoms or sudden heart attacks do not reduce mortality (likelihood of dying from a heart attack) and often people end up needing a stent within a stent later.  Lifestyle and medications are more important than stents or bypass under most circumstances.    The only things to do to lower your  "risk of sudden heart attack (or stroke which is the same disease but a different location- in the brain):    -Take a statin medication (as a vascular medication, not just a cholesterol medication) which is our most powerful weapon to stabilize the plaques and reduce inflammation making them less likely to rupture open and cause a sudden heart attack/stroke.  At this time, benefit from a statin for you is not clear.  You do not necessarily need a statin medication at this time.  -Keep LDL cholesterol under 100 (or under 70 if you have already had a heart attack/stroke).  Your LDL cholesterol is 116, diet and exercise first to see if you can get it under 100.  -Control blood pressure, under 130/80, ideally closer to 120/80.  -Control blood sugar, don't get diabetes.  -Don't smoke.  -Eat a heart healthy diet that reduces inflammation: Pritikin, Mediterranean, Vegetarian, Vegan  -Get regular exercise: 150 minutes of moderate exercise OR 75 minutes of very vigorous exercise every week.  -Keep your body mass index under 30, ideal BMI is 20-25.  -Know the signs and symptoms of heart attack and stroke and when to call 9-1-1.  -Shoveling snow can provoke a heart attack because the cold weather constricts heart arteries and heavy/prolonged straining can reduce blood flow down heart arteries.    Signs of a stroke: sudden inability to talk, smile on one side, confusion, inability to move arm/leg on one side, numbness/tingling of arm/leg on one side that is not typical. \"Think FAST.\"  F=face drooping  A=arm weakness  S=Speech difficulty  T= time to call 911 (do not give ride to someone, call ambulance to alert the hospital to start stroke protocol)    Signs of a heart attack: Anything very intense coming from the chest that lasts more than 15 minutes, consider calling 911.  -Pain or pressure in the chest that is intense, lasts more than 15 minutes, not explained by other known reasons such as known/similar heartburn  -It can " feel like severe heart burn but associated with nausea, vomiting  -It can be vague pain that radiates to the neck, jaw, shoulders, arm/arms, wrap around your back or even cause a toothache  -Can be associated with unusual shortness of breath at rest or sense of impending doom      Please look into the following diets:    Mediterranean diet.  Pritikin - used at [facility], probably one of the best for anti-inflammatory  DASH DIET - American Heart Association (mostly for hypertension)  Ornish Diet   Vegan diet (proven to reverse vascular disease)    Resources to learn more:  American Heart Association   Www.Cardiosmart.org, sponsored by the American College of cardiology.    -I will send a [employer] message with the results of your heart monitor    -If your heart monitor is only showing brief atrial runs from the top of the heart, we do not need to do anything.  If the heart palpitations ever start to bother you, we would put you back on a beta-blocker to suppress them to help you feel better.  If I see something from the bottom part of the heart (ventricular tachycardia), I will probably ask you to start a beta-blocker again.      Return in about 1 year (around 11/4/2022).    It is my pleasure to participate in the care of Ms. Mary Lou Villeda.  Please do not hesitate to contact me with questions or concerns.    Yasmin Simpson MD, [facility]  Cardiologist [facility]    Please note that this dictation was created using voice recognition software. I have made every reasonable attempt to correct obvious errors, but it is possible there are errors of grammar and possibly content that I did not discover before finalizing the note.        Toro Almaraz M.D.  [address] [place] [address]  [place] [address]  Via In Basket

## 2021-11-04 NOTE — PATIENT INSTRUCTIONS
-Zio patch heart monitor for approximately 5 days.  You can take it off earlier or keep it on longer.  If you do not have any significant palpitations, consider caffeine to make sure we capture something.  I just want to see what is happening with the heart electrical system    -Echocardiogram January 2024 unless you have progression of symptoms such as worsening shortness of breath or strain swelling around the legs of the abdomen.    -Risk versus benefit of aspirin is not well known.  It would be reasonable to stop the aspirin.      Primary prevention coronary artery disease:    Anyone who lives long enough with develop some degree of coronary artery disease. Blockages in the heart artery should only have a stent or bypass if they are more than 70% blocked (at which point most people have symptoms such as chest pain or unusual shortness of breath with activity that goes away with rest).   People having a sudden heart attack should have a stent placed in the blocked artery.   People having chest pain that limits their ability to function could consider having a stent placed in the artery.  Otherwise, medication and lifestyle changes are the preferred treatments. Stents placed outside of severe symptoms or sudden heart attacks do not reduce mortality (likelihood of dying from a heart attack) and often people end up needing a stent within a stent later.  Lifestyle and medications are more important than stents or bypass under most circumstances.    The only things to do to lower your risk of sudden heart attack (or stroke which is the same disease but a different location- in the brain):    -Take a statin medication (as a vascular medication, not just a cholesterol medication) which is our most powerful weapon to stabilize the plaques and reduce inflammation making them less likely to rupture open and cause a sudden heart attack/stroke.  At this time, benefit from a statin for you is not clear.  You do not  "necessarily need a statin medication at this time.  -Keep LDL cholesterol under 100 (or under 70 if you have already had a heart attack/stroke).  Your LDL cholesterol is 116, diet and exercise first to see if you can get it under 100.  -Control blood pressure, under 130/80, ideally closer to 120/80.  -Control blood sugar, don't get diabetes.  -Don't smoke.  -Eat a heart healthy diet that reduces inflammation: Pritikin, Mediterranean, Vegetarian, Vegan  -Get regular exercise: 150 minutes of moderate exercise OR 75 minutes of very vigorous exercise every week.  -Keep your body mass index under 30, ideal BMI is 20-25.  -Know the signs and symptoms of heart attack and stroke and when to call 9-1-1.  -Shoveling snow can provoke a heart attack because the cold weather constricts heart arteries and heavy/prolonged straining can reduce blood flow down heart arteries.    Signs of a stroke: sudden inability to talk, smile on one side, confusion, inability to move arm/leg on one side, numbness/tingling of arm/leg on one side that is not typical. \"Think FAST.\"  F=face drooping  A=arm weakness  S=Speech difficulty  T= time to call 911 (do not give ride to someone, call ambulance to alert the hospital to start stroke protocol)    Signs of a heart attack: Anything very intense coming from the chest that lasts more than 15 minutes, consider calling 911.  -Pain or pressure in the chest that is intense, lasts more than 15 minutes, not explained by other known reasons such as known/similar heartburn  -It can feel like severe heart burn but associated with nausea, vomiting  -It can be vague pain that radiates to the neck, jaw, shoulders, arm/arms, wrap around your back or even cause a toothache  -Can be associated with unusual shortness of breath at rest or sense of impending doom      Please look into the following diets:    Mediterranean diet.  Pritikin - used at [facility], probably one of the best for " anti-inflammatory  DASH DIET - American Heart Association (mostly for hypertension)  Ornish Diet   Vegan diet (proven to reverse vascular disease)    Resources to learn more:  American Heart Association   Www.Cardiosmart.org, sponsored by the American College of cardiology.    -I will send a [employer] message with the results of your heart monitor    -If your heart monitor is only showing brief atrial runs from the top of the heart, we do not need to do anything.  If the heart palpitations ever start to bother you, we would put you back on a beta-blocker to suppress them to help you feel better.  If I see something from the bottom part of the heart (ventricular tachycardia), I will probably ask you to start a beta-blocker again.

## 2021-11-16 ENCOUNTER — HOSPITAL ENCOUNTER (OUTPATIENT)
Dept: RADIOLOGY | Facility: MEDICAL CENTER | Age: 66
End: 2021-11-16
Attending: FAMILY MEDICINE
Payer: MEDICARE

## 2021-11-16 DIAGNOSIS — Z12.31 ENCOUNTER FOR SCREENING MAMMOGRAM FOR BREAST CANCER: ICD-10-CM

## 2021-11-16 PROCEDURE — 77063 BREAST TOMOSYNTHESIS BI: CPT

## 2021-11-17 ENCOUNTER — NON-PROVIDER VISIT (OUTPATIENT)
Dept: CARDIOLOGY | Facility: MEDICAL CENTER | Age: 66
End: 2021-11-17
Payer: MEDICARE

## 2021-11-17 ENCOUNTER — TELEPHONE (OUTPATIENT)
Dept: CARDIOLOGY | Facility: MEDICAL CENTER | Age: 66
End: 2021-11-17

## 2021-11-17 DIAGNOSIS — I49.1 APC (ATRIAL PREMATURE CONTRACTIONS): ICD-10-CM

## 2021-11-17 DIAGNOSIS — I45.4 BUNDLE BRANCH BLOCK: ICD-10-CM

## 2021-11-17 DIAGNOSIS — I49.3 PVCS (PREMATURE VENTRICULAR CONTRACTIONS): ICD-10-CM

## 2021-11-17 DIAGNOSIS — I47.10 SVT (SUPRAVENTRICULAR TACHYCARDIA) (HCC): ICD-10-CM

## 2021-11-17 NOTE — TELEPHONE ENCOUNTER
Patient enrolled in the 14 day Zio XT Holter monitoring program per Yasmin Simpson MD.  >In-Clinic hookup.    >Currently pending EOS.

## 2021-11-24 ENCOUNTER — TELEPHONE (OUTPATIENT)
Dept: HEALTH INFORMATION MANAGEMENT | Facility: OTHER | Age: 66
End: 2021-11-24

## 2021-12-06 DIAGNOSIS — R00.2 PALPITATIONS: ICD-10-CM

## 2021-12-07 ENCOUNTER — PATIENT MESSAGE (OUTPATIENT)
Dept: CARDIOLOGY | Facility: MEDICAL CENTER | Age: 66
End: 2021-12-07

## 2021-12-07 ENCOUNTER — TELEPHONE (OUTPATIENT)
Dept: CARDIOLOGY | Facility: MEDICAL CENTER | Age: 66
End: 2021-12-07

## 2021-12-07 PROCEDURE — 93248 EXT ECG>7D<15D REV&INTERPJ: CPT | Performed by: INTERNAL MEDICINE

## 2021-12-07 PROCEDURE — 93246 EXT ECG>7D<15D RECORDING: CPT | Performed by: INTERNAL MEDICINE

## 2021-12-07 RX ORDER — METOPROLOL SUCCINATE 25 MG/1
12.5 TABLET, EXTENDED RELEASE ORAL DAILY
Qty: 45 TABLET | Refills: 3 | Status: SHIPPED | OUTPATIENT
Start: 2021-12-07 | End: 2022-02-01

## 2021-12-07 NOTE — TELEPHONE ENCOUNTER
Message  Received: Today  Yasmin Simpson M.D.  Sarah San R.N.  She has shorts runs of SVT and seems to feel the SVT and some PACs (premature atrial contractions). No of this is harmful but I do recommend trying metoprolol succinate starting 12.5 mg once daily to suppress extra beats. The medication is intended to help her feel better, if she feels worse on the medication, ok to stop it. If she feels no different at 12.5 mg then go up to 25 mg whenever she wants, max dose is 200 mg daily so we are staring very low. Send Rx if she is willing to try.                 Cardiac Event Monitor      See [employer] message

## 2022-01-31 ENCOUNTER — PATIENT MESSAGE (OUTPATIENT)
Dept: CARDIOLOGY | Facility: MEDICAL CENTER | Age: 67
End: 2022-01-31

## 2022-02-01 RX ORDER — METOPROLOL SUCCINATE 25 MG/1
25 TABLET, EXTENDED RELEASE ORAL DAILY
Qty: 90 TABLET | Refills: 3 | Status: SHIPPED | OUTPATIENT
Start: 2022-02-01 | End: 2022-11-21 | Stop reason: SDUPTHER

## 2022-03-28 ENCOUNTER — TELEPHONE (OUTPATIENT)
Dept: MEDICAL GROUP | Facility: MEDICAL CENTER | Age: 67
End: 2022-03-28
Payer: MEDICARE

## 2022-03-28 NOTE — TELEPHONE ENCOUNTER
ESTABLISHED PATIENT PRE-VISIT PLANNING     Patient was NOT contacted to complete PVP.     Note: Patient will not be contacted if there is no indication to call.     1.  Reviewed notes from the last few office visits within the medical group: Yes    2.  If any orders were placed at last visit or intended to be done for this visit (i.e. 6 mos follow-up), do we have Results/Consult Notes?         •  Labs - Labs ordered, completed on 10/25/2021 and results are in chart.  Note: If patient appointment is for lab review and patient did not complete labs, check with provider if OK to reschedule patient until labs completed.       •  Imaging - Imaging ordered, completed and results are in chart.       •  Referrals - Referral ordered, patient has NOT been seen.    -Lavell Bingham M.D.-Sleep Medicine: No Appointments found under Appointment Desk Tab.    3. Is this appointment scheduled as a Hospital Follow-Up? No    4.  Immunizations were updated in Epic using Reconcile Outside Information activity? Yes    5.  Patient is due for the following Health Maintenance Topics:   There are no preventive care reminders to display for this patient.    6.  AHA (Pulse8) form printed for Provider? N/A, Compliant

## 2022-03-29 ENCOUNTER — OFFICE VISIT (OUTPATIENT)
Dept: MEDICAL GROUP | Facility: MEDICAL CENTER | Age: 67
End: 2022-03-29
Payer: MEDICARE

## 2022-03-29 VITALS
OXYGEN SATURATION: 95 % | DIASTOLIC BLOOD PRESSURE: 60 MMHG | TEMPERATURE: 97.2 F | SYSTOLIC BLOOD PRESSURE: 106 MMHG | RESPIRATION RATE: 12 BRPM | HEART RATE: 71 BPM | HEIGHT: 68 IN | BODY MASS INDEX: 27.89 KG/M2 | WEIGHT: 184 LBS

## 2022-03-29 DIAGNOSIS — T45.1X5A CHEMOTHERAPY-INDUCED PERIPHERAL NEUROPATHY (HCC): ICD-10-CM

## 2022-03-29 DIAGNOSIS — G62.0 CHEMOTHERAPY-INDUCED PERIPHERAL NEUROPATHY (HCC): ICD-10-CM

## 2022-03-29 DIAGNOSIS — I42.8 NONISCHEMIC CARDIOMYOPATHY (HCC): ICD-10-CM

## 2022-03-29 DIAGNOSIS — E78.00 ELEVATED LDL CHOLESTEROL LEVEL: ICD-10-CM

## 2022-03-29 DIAGNOSIS — I10 ESSENTIAL HYPERTENSION: ICD-10-CM

## 2022-03-29 DIAGNOSIS — M17.12 PRIMARY OSTEOARTHRITIS OF LEFT KNEE: ICD-10-CM

## 2022-03-29 DIAGNOSIS — G47.33 OSA ON CPAP: ICD-10-CM

## 2022-03-29 DIAGNOSIS — Z91.89 HISTORY OF SEVERE SUN EXPOSURE: ICD-10-CM

## 2022-03-29 PROCEDURE — 99214 OFFICE O/P EST MOD 30 MIN: CPT | Performed by: FAMILY MEDICINE

## 2022-03-29 ASSESSMENT — FIBROSIS 4 INDEX: FIB4 SCORE: 1.35

## 2022-03-29 ASSESSMENT — PATIENT HEALTH QUESTIONNAIRE - PHQ9: CLINICAL INTERPRETATION OF PHQ2 SCORE: 0

## 2022-03-29 NOTE — PROGRESS NOTES
Chief Complaint   Patient presents with   • Lab Results   • Referral Needed     dermatologist       Subjective:     HPI:   Karla Villeda presents today with the followin. Essential hypertension  HTN - Chronic condition stable. Currently taking all meds as directed.   She is not taking baby aspirin daily.   She is not monitoring BP at home.  Blood pressure in excellent control here today  Denies symptoms low BP: light-headed, tunnel-vision, unusual fatigue.   Denies symptoms high BP:pounding headache, visual changes, palpitations, flushed face.   Denies medicine side effects: unusual fatigue, slow heartbeat, foot/leg swelling, cough.  Follow-up lab orders discussed and placed.    2. Elevated LDL cholesterol level  Patient denies chest pain, chest pressure, palpitations or exertional shortness of breath. Patient is not on lipid-lowering medication.  Patient has slight LDL elevation with excellent risk ratio. Patient is a never smoker. Patient takes no aspirin daily. Patient has no history of myocardial infarction, stroke or PVD.  Follow-up lab orders discussed and placed.    3. Primary osteoarthritis of left knee  Patient is troubled with left knee pain, stiffness and discomfort.  Several years ago she was told that she had arthritis which was confirmed by x-ray.  She is having increasing pain.  X-ray order discussed and placed.  She continues to stay active and stretch.    4. History of severe sun exposure  Patient has history of severe sun exposure.  She saw dermatology about a year ago and was placed on Efudex on her arms, neck and I believe face.  She was told to come back for a 1 year follow-up.  Referral discussed and placed.    5. LAKIA on CPAP  Patient has seen a sleep specialist here and underwent an overnight home sleep study on her CPAP which was favorable though there was some mild reduction in oxygen saturation.  Patient tolerates her CPAP well and states she feels it is helpful to her.    6.  "Chemotherapy-induced peripheral neuropathy (HCC)  Patient states her past chemotherapy-induced peripheral neuropathy is fairly well controlled with her current gabapentin dose.  It has reduced slightly in intensity over time.    7. Nonischemic cardiomyopathy (HCC)  This diagnosis is per Dr. Simpson.  She had an echocardiogram a year ago which showed a normal ejection fraction with mild ascending aorta diameter increased.        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 90 Tablet 3   • GLUCOSAMINE-CHONDROITIN PO Take  by mouth.     • gabapentin (NEURONTIN) 100 MG Cap TAKE 1 CAPSULE BY MOUTH THREE TIMES A  Capsule 3   • losartan (COZAAR) 25 MG Tab Take 1 Tablet by mouth every day. 90 Tablet 3   • Multiple Vitamins-Minerals (MULTIVITAMIN ADULT PO) Take  by mouth.     • Calcium Carb-Cholecalciferol (CALCIUM + D3 PO) Take  by mouth.       No current facility-administered medications for this visit.       Allergies   Allergen Reactions   • Ampicillin      rash   • Biaxin [Clarithromycin]      rash   • Sulfa Drugs      rash       ROS: As per HPI       Objective:     /60 (BP Location: Right arm, Patient Position: Sitting, BP Cuff Size: Adult)   Pulse 71   Temp 36.2 °C (97.2 °F) (Temporal)   Resp 12   Ht 1.727 m (5' 8\")   Wt 83.5 kg (184 lb)   SpO2 95%  Body mass index is 27.98 kg/m².    Physical Exam:  Constitutional: Well-developed and well-nourished. Not diaphoretic. No distress. Lucid and fluent.  Patient, physician and staff all " wearing masks.  Skin: Skin is warm and dry. No rash noted.  Head: Atraumatic without lesions.  Eyes: Conjunctivae and extraocular motions are normal. Pupils are equal, round, and reactive to light. No scleral icterus.   Ears:  External ears unremarkable.   Neck: Supple, trachea midline. No thyromegaly present. No cervical or supraclavicular lymphadenopathy. No JVD or carotid bruits appreciated  Cardiovascular: Regular rate and rhythm.  Normal S1, S2 without murmur appreciated.  Chest: Effort normal. Clear to auscultation throughout. No adventitious sounds.   Abdomen: Soft, non tender, and without distention. Active bowel sounds in all four quadrants. No rebound, guarding, masses or hepatosplenomegaly.  Extremities: No cyanosis, clubbing, erythema, nor edema.   Neurological: Alert and oriented x 3. No tremor appreciated.  Psychiatric:  Behavior, mood, and affect are appropriate.       Assessment and Plan:     67 y.o. female with the following issues:    1. Essential hypertension  Comp Metabolic Panel    Lipid Profile    TSH   2. Elevated LDL cholesterol level  Comp Metabolic Panel    Lipid Profile    TSH   3. Primary osteoarthritis of left knee  DX-KNEE COMPLETE 4+ LEFT   4. History of severe sun exposure  Referral to Dermatology   5. LAKIA on CPAP  CBC WITH DIFFERENTIAL    TSH   6. Chemotherapy-induced peripheral neuropathy (HCC)     7. Nonischemic cardiomyopathy (HCC)           Followup: Return in about 6 months (around 9/29/2022), or if symptoms worsen or fail to improve.

## 2022-06-10 ENCOUNTER — PATIENT MESSAGE (OUTPATIENT)
Dept: HEALTH INFORMATION MANAGEMENT | Facility: OTHER | Age: 67
End: 2022-06-10

## 2022-06-17 ENCOUNTER — HOSPITAL ENCOUNTER (OUTPATIENT)
Dept: LAB | Facility: MEDICAL CENTER | Age: 67
End: 2022-06-17
Attending: FAMILY MEDICINE
Payer: MEDICARE

## 2022-06-17 DIAGNOSIS — G47.33 OSA ON CPAP: ICD-10-CM

## 2022-06-17 DIAGNOSIS — I10 ESSENTIAL HYPERTENSION: ICD-10-CM

## 2022-06-17 DIAGNOSIS — E78.00 ELEVATED LDL CHOLESTEROL LEVEL: ICD-10-CM

## 2022-06-17 LAB
ALBUMIN SERPL BCP-MCNC: 4.2 G/DL (ref 3.2–4.9)
ALBUMIN/GLOB SERPL: 1.6 G/DL
ALP SERPL-CCNC: 76 U/L (ref 30–99)
ALT SERPL-CCNC: 19 U/L (ref 2–50)
ANION GAP SERPL CALC-SCNC: 10 MMOL/L (ref 7–16)
AST SERPL-CCNC: 16 U/L (ref 12–45)
BASOPHILS # BLD AUTO: 0.4 % (ref 0–1.8)
BASOPHILS # BLD: 0.02 K/UL (ref 0–0.12)
BILIRUB SERPL-MCNC: 0.8 MG/DL (ref 0.1–1.5)
BUN SERPL-MCNC: 17 MG/DL (ref 8–22)
CALCIUM SERPL-MCNC: 9.3 MG/DL (ref 8.5–10.5)
CHLORIDE SERPL-SCNC: 105 MMOL/L (ref 96–112)
CHOLEST SERPL-MCNC: 204 MG/DL (ref 100–199)
CO2 SERPL-SCNC: 25 MMOL/L (ref 20–33)
CREAT SERPL-MCNC: 0.78 MG/DL (ref 0.5–1.4)
EOSINOPHIL # BLD AUTO: 0.07 K/UL (ref 0–0.51)
EOSINOPHIL NFR BLD: 1.4 % (ref 0–6.9)
ERYTHROCYTE [DISTWIDTH] IN BLOOD BY AUTOMATED COUNT: 44.6 FL (ref 35.9–50)
FASTING STATUS PATIENT QL REPORTED: NORMAL
GFR SERPLBLD CREATININE-BSD FMLA CKD-EPI: 84 ML/MIN/1.73 M 2
GLOBULIN SER CALC-MCNC: 2.7 G/DL (ref 1.9–3.5)
GLUCOSE SERPL-MCNC: 87 MG/DL (ref 65–99)
HCT VFR BLD AUTO: 41.5 % (ref 37–47)
HDLC SERPL-MCNC: 66 MG/DL
HGB BLD-MCNC: 14 G/DL (ref 12–16)
IMM GRANULOCYTES # BLD AUTO: 0.01 K/UL (ref 0–0.11)
IMM GRANULOCYTES NFR BLD AUTO: 0.2 % (ref 0–0.9)
LDLC SERPL CALC-MCNC: 121 MG/DL
LYMPHOCYTES # BLD AUTO: 1.97 K/UL (ref 1–4.8)
LYMPHOCYTES NFR BLD: 39.2 % (ref 22–41)
MCH RBC QN AUTO: 30.9 PG (ref 27–33)
MCHC RBC AUTO-ENTMCNC: 33.7 G/DL (ref 33.6–35)
MCV RBC AUTO: 91.6 FL (ref 81.4–97.8)
MONOCYTES # BLD AUTO: 0.47 K/UL (ref 0–0.85)
MONOCYTES NFR BLD AUTO: 9.4 % (ref 0–13.4)
NEUTROPHILS # BLD AUTO: 2.48 K/UL (ref 2–7.15)
NEUTROPHILS NFR BLD: 49.4 % (ref 44–72)
NRBC # BLD AUTO: 0 K/UL
NRBC BLD-RTO: 0 /100 WBC
PLATELET # BLD AUTO: 200 K/UL (ref 164–446)
PMV BLD AUTO: 9.7 FL (ref 9–12.9)
POTASSIUM SERPL-SCNC: 4.2 MMOL/L (ref 3.6–5.5)
PROT SERPL-MCNC: 6.9 G/DL (ref 6–8.2)
RBC # BLD AUTO: 4.53 M/UL (ref 4.2–5.4)
SODIUM SERPL-SCNC: 140 MMOL/L (ref 135–145)
TRIGL SERPL-MCNC: 87 MG/DL (ref 0–149)
TSH SERPL DL<=0.005 MIU/L-ACNC: 5.18 UIU/ML (ref 0.38–5.33)
WBC # BLD AUTO: 5 K/UL (ref 4.8–10.8)

## 2022-06-17 PROCEDURE — 80061 LIPID PANEL: CPT

## 2022-06-17 PROCEDURE — 84443 ASSAY THYROID STIM HORMONE: CPT

## 2022-06-17 PROCEDURE — 36415 COLL VENOUS BLD VENIPUNCTURE: CPT

## 2022-06-17 PROCEDURE — 85025 COMPLETE CBC W/AUTO DIFF WBC: CPT

## 2022-06-17 PROCEDURE — 80053 COMPREHEN METABOLIC PANEL: CPT

## 2022-06-21 ENCOUNTER — APPOINTMENT (OUTPATIENT)
Dept: MEDICAL GROUP | Facility: MEDICAL CENTER | Age: 67
End: 2022-06-21
Payer: MEDICARE

## 2022-06-27 ENCOUNTER — OFFICE VISIT (OUTPATIENT)
Dept: DERMATOLOGY | Facility: IMAGING CENTER | Age: 67
End: 2022-06-27
Payer: MEDICARE

## 2022-06-27 DIAGNOSIS — D18.01 CHERRY ANGIOMA: ICD-10-CM

## 2022-06-27 DIAGNOSIS — L82.1 SK (SEBORRHEIC KERATOSIS): ICD-10-CM

## 2022-06-27 DIAGNOSIS — L85.1 STUCCO KERATOSES: ICD-10-CM

## 2022-06-27 DIAGNOSIS — D22.9 MULTIPLE NEVI: ICD-10-CM

## 2022-06-27 DIAGNOSIS — D48.9 NEOPLASM OF UNCERTAIN BEHAVIOR: ICD-10-CM

## 2022-06-27 DIAGNOSIS — L81.4 LENTIGINES: ICD-10-CM

## 2022-06-27 DIAGNOSIS — L57.0 ACTINIC KERATOSIS: ICD-10-CM

## 2022-06-27 DIAGNOSIS — Z12.83 SKIN CANCER SCREENING: ICD-10-CM

## 2022-06-27 PROCEDURE — 17000 DESTRUCT PREMALG LESION: CPT | Mod: 59 | Performed by: NURSE PRACTITIONER

## 2022-06-27 PROCEDURE — 17003 DESTRUCT PREMALG LES 2-14: CPT | Performed by: NURSE PRACTITIONER

## 2022-06-27 PROCEDURE — 11102 TANGNTL BX SKIN SINGLE LES: CPT | Performed by: NURSE PRACTITIONER

## 2022-06-27 PROCEDURE — 99213 OFFICE O/P EST LOW 20 MIN: CPT | Mod: 25 | Performed by: NURSE PRACTITIONER

## 2022-06-27 NOTE — PROGRESS NOTES
DERMATOLOGY NOTE  FOLLOW UP VISIT       Chief complaint: Follow-Up and Skin Lesion     HPI:   Rough skin lesion   Location:  Right hand    Time present:  X 1 month   Painful lesion: No  Itching lesion: No  Enlarging lesion: No  Anything make it better or worse?no    HPI:  Skin lesion   Location:  Left side of neck   Time present:  1 month   Painful lesion: No  Itching lesion: No  Enlarging lesion: No  Anything make it better or worse?no     HPI:  Skin lesion   Location:left side of neck   Time present: few weeks   Painful lesion: No  Itching lesion: No  Enlarging lesion: No  Anything make it better or worse?no        Initial Hx   Here today for AK follow up post efudex treatment to forearms. Did not do chest or neck as she would like to wait until fall.   Patient reports feeling less of the gritty rough spots on arms.   Face treated with LN at visit with Dr. Cody on 3/9/2021     History of skin cancer: No  History of precancers/actinic keratoses: Yes, Details: hands, arms, treated with liquid nitrogen and Efudex on forhead and arms  History of biopsies:No  History of blistering/severe sunburns:Yes, Details: childhood  Family history of skin cancer:Yes, Details: father SCC, brother melanoma  Family history of atypical moles:No        Allergies   Allergen Reactions   • Ampicillin      rash   • Biaxin [Clarithromycin]      rash   • Sulfa Drugs      rash        MEDICATIONS:  Medications relevant to specialty reviewed.     REVIEW OF SYSTEMS:   Positive for skin (see HPI)  Negative for fevers and chills       EXAM:  There were no vitals taken for this visit.  Constitutional: Well-developed, well-nourished, and in no distress.     A total body skin exam was performed excluding the genitals per patient preference and including the following areas: head (including face), neck, chest, abdomen, groin/buttocks, back, bilateral upper extremities, and bilateral lower extremities with the following pertinent findings listed below  and/or in assessment/plan.     -sun exposed skin of trunk and b/l upper, lower extremities and face with scattered clinically benign light brown reticulated macules all of which were morphologically similar and none of which were suspicious for skin cancer today on exam    -Several scattered 1-3mm bright red macules and thin papules on the trunk    -Multiple tan medium brown skin-colored macules papules scattered over the trunk >> extremities, All with benign-appearing pigment network patterns on dermoscopy     -Several tan skin-colored stuck-on waxy papules scattered on the trunk    -Stucco keratosis bilateral lower extremities    -Ill-defined erythematous gritty/scaly papule over the  Lt forehead, Lt cheek, bilateral dorsum of hands, R dorsal wrist    5mm pink papule right pre-tebial       IMPRESSION / PLAN:    1. Neoplasm of uncertain behavior  Procedure Note   Procedure: Biopsy by shave technique  Location: R pretibial  Size: as noted in exam  Preoperative diagnosis: SK, ISK, LPLK vs NMSC  Risks, benefits and alternatives of procedure discussed, verbal consent obtained for photo (see chart) and written informed consent obtained for procedure. Time out completed. Area of biopsy prepped with alcohol. Anesthesia with 1% lidocaine with epinephrine administered with 30 gauge needle. Shave biopsy of the site performed. Hemostasis achieved with pressure and aluminum chloride. Vaseline applied to wound with bandage. Patient tolerated procedure well and there were no complications. The specimen was sent to the pathology lab by the staff. Wound care was discussed.      2. Actinic keratosis  - NMSC education/counseling as noted below  CRYOTHERAPY:  Risks (including, but not limited to: skin discoloration, redness, blister, blood blister, recurrence, need for further treatment, infection, scar) and benefits of cryotherapy discussed. Patient verbally agreed to proceed with treatment. 1 cryotherapy freeze thaw cycles of 10  seconds were applied to 10 lesions on areas as noted on exam with cryac. Patient tolerated procedure well. Aftercare instructions given--no specific care needed unless irritated during healing process, can apply Vaseline with small band-aid if needed.      3. Lentigines  - Benign-appearing nature of lesions discussed during exam.   - Advised to continue to monitor for any return to clinic for new or concerning changes.      4. Cherry angioma  - Benign-appearing nature of lesions discussed during exam.   - Advised to continue to monitor for any return to clinic for new or concerning changes.      5. Multiple nevi  - Benign-appearing nature of lesions discussed during exam.   - Advised to continue to monitor for any return to clinic for new or concerning changes.  - ABCDE's of melanoma discussed      6. SK (seborrheic keratosis)  - Benign-appearing nature of lesions discussed during exam.   - Advised to continue to monitor for any return to clinic for new or concerning changes.      7. Stucco keratoses  - Benign-appearing nature of lesions discussed during exam.   - Advised to continue to monitor for any return to clinic for new or concerning changes.      8. Skin cancer screening  Skin cancer education  - discussed importance of sun protective clothing, eyewear in addition to the use of broad spectrum sunscreen with SPF 30 or greater, as well as need for reapplication ~every 2 hours when exposed to UVR  - discussed importance following up for any new or changing lesions as noted in handout given, but every 12 months exams in clinic in the setting of dermatologic history  - ABCDE's of melanoma discussed/handout given          Discussed risks, benefits, alternative treatments as well as common side effects associated with prescribed treatment, Patient verbalized understanding and agrees with plan regarding the above          Please note that this dictation was created using voice recognition software. I have made every  reasonable attempt to correct obvious errors, but I expect that there are errors of grammar and possibly content that I did not discover before finalizing the note.      Return to clinic in: Return in about 6 weeks (around 8/8/2022) for AK recheck,IGLESIA 1 year, pending bx results. and as needed for any new or changing skin lesions.

## 2022-06-29 ENCOUNTER — OFFICE VISIT (OUTPATIENT)
Dept: MEDICAL GROUP | Facility: MEDICAL CENTER | Age: 67
End: 2022-06-29
Payer: MEDICARE

## 2022-06-29 VITALS
HEART RATE: 84 BPM | RESPIRATION RATE: 14 BRPM | SYSTOLIC BLOOD PRESSURE: 112 MMHG | HEIGHT: 68 IN | BODY MASS INDEX: 28.07 KG/M2 | TEMPERATURE: 97.4 F | DIASTOLIC BLOOD PRESSURE: 54 MMHG | WEIGHT: 185.2 LBS | OXYGEN SATURATION: 95 %

## 2022-06-29 DIAGNOSIS — Z00.00 MEDICARE ANNUAL WELLNESS VISIT, SUBSEQUENT: ICD-10-CM

## 2022-06-29 DIAGNOSIS — I44.7 LBBB (LEFT BUNDLE BRANCH BLOCK): ICD-10-CM

## 2022-06-29 DIAGNOSIS — G62.0 CHEMOTHERAPY-INDUCED PERIPHERAL NEUROPATHY (HCC): ICD-10-CM

## 2022-06-29 DIAGNOSIS — G47.33 OSA ON CPAP: ICD-10-CM

## 2022-06-29 DIAGNOSIS — I10 ESSENTIAL HYPERTENSION: ICD-10-CM

## 2022-06-29 DIAGNOSIS — Z85.72 HISTORY OF B-CELL LYMPHOMA: ICD-10-CM

## 2022-06-29 DIAGNOSIS — Z86.19 HISTORY OF COCCIDIOIDOMYCOSIS: ICD-10-CM

## 2022-06-29 DIAGNOSIS — T45.1X5A CHEMOTHERAPY-INDUCED PERIPHERAL NEUROPATHY (HCC): ICD-10-CM

## 2022-06-29 PROCEDURE — G0439 PPPS, SUBSEQ VISIT: HCPCS | Performed by: FAMILY MEDICINE

## 2022-06-29 RX ORDER — LOSARTAN POTASSIUM 25 MG/1
25 TABLET ORAL
Qty: 90 TABLET | Refills: 3 | Status: SHIPPED | OUTPATIENT
Start: 2022-06-29 | End: 2022-11-21 | Stop reason: SDUPTHER

## 2022-06-29 ASSESSMENT — FIBROSIS 4 INDEX: FIB4 SCORE: 1.21

## 2022-06-29 NOTE — PROGRESS NOTES
Chief Complaint   Patient presents with   • Annual Wellness Visit       HPI:  Karla Villeda is a 67 y.o. 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 90 Tablet 3   • metoprolol SR (TOPROL XL) 25 MG TABLET SR 24 HR Take 1 Tablet by mouth every day. 90 Tablet 3   • gabapentin (NEURONTIN) 100 MG Cap TAKE 1 CAPSULE BY MOUTH THREE TIMES A  Capsule 3   • Multiple Vitamins-Minerals (MULTIVITAMIN ADULT PO) Take  by mouth.     • Calcium Carb-Cholecalciferol (CALCIUM + D3 PO) Take  by mouth.       No current facility-administered medications for this visit.          Current supplements as per medication list.     Allergies: Ampicillin, Biaxin [clarithromycin], and Sulfa drugs    Current social contact/activities: discussed, active.  Just had a long camping trip across the country all the way to [facility]     She  reports that she has never smoked. She has never used smokeless tobacco. She reports current alcohol use. She reports that she does not use drugs.  Counseling given: Yes      DPA/Advanced Directive:  Patient has Advanced Directive, Living Will and Durable Power of , but it is not on file. Instructed to bring in a copy to scan into their chart.    ROS:    Gait: Uses no assistive device  Ostomy: No  Other tubes: No  Amputations: No  Chronic oxygen use: No  Last eye exam: 2 weeks ago  Wears hearing aids: No   : Denies any urinary leakage during the last 6 months    Screening:  Discussed COVID booster    Depression Screening  Little interest or  pleasure in doing things?   0  Feeling down, depressed , or hopeless?  0  Trouble falling or staying asleep, or sleeping too much?     Feeling tired or having little energy?     Poor appetite or overeating?     Feeling bad about yourself - or that you are a failure or have let yourself or your family down?    Trouble concentrating on things, such as reading the newspaper or watching television?    Moving or speaking so slowly that other people could have noticed.  Or the opposite - being so fidgety or restless that you have been moving around a lot more than usual?     Thoughts that you would be better off dead, or of hurting yourself?     Patient Health Questionnaire Score:  0    If depressive symptoms identified deferred to follow up visit unless specifically addressed in assessment and plan.    Interpretation of PHQ-9 Total Score   Score Severity   1-4 No Depression   5-9 Mild Depression   10-14 Moderate Depression   15-19 Moderately Severe Depression   20-27 Severe Depression    Screening for Cognitive Impairment  Three Minute Recall (daughter, heaven, mountain)  3/3    Edgardo clock face with all 12 numbers and set the hands to show 10 past 11. yes   5/5    Cognitive concerns identified deferred for follow up unless specifically addressed in assessment and plan.    Fall Risk Assessment  Has the patient had two or more falls in the last year or any fall with injury in the last year?   no    Safety Assessment  Throw rugs on floor.   no  Handrails on all stairs.   yes  Good lighting in all hallways.   yes  Difficulty hearing.   no  Patient counseled about all safety risks that were identified.    Functional Assessment ADLs  Are there any barriers preventing you from cooking for yourself or meeting nutritional needs?   .no    Are there any barriers preventing you from driving safely or obtaining transportation?   .no    Are there any barriers preventing you from using a telephone or calling for help?   .no    Are  there any barriers preventing you from shopping?   .no    Are there any barriers preventing you from taking care of your own finances?   no.    Are there any barriers preventing you from managing your medications?   no.    Are there any barriers preventing you from showering, bathing or dressing yourself?  no.    Are you currently engaging in any exercise or physical activity?   .walks regularly    What is your perception of your health?   good    Health Maintenance Summary          Overdue - COVID-19 Vaccine (4 - Booster for Pfizer series) Overdue since 2/18/2022    10/18/2021  Imm Admin: PFIZER PURPLE CAP SARS-COV-2 VACCINATION (12+)    10/18/2021  Imm Admin: PFIZER PURPLE CAP SARS-COV-2 VACCINATION (12+)    04/02/2021  Imm Admin: PFIZER PURPLE CAP SARS-COV-2 VACCINATION (12+)    03/11/2021  Imm Admin: PFIZER PURPLE CAP SARS-COV-2 VACCINATION (12+)          Postponed - IMM PNEUMOCOCCAL VACCINE: 65+ Years (2 - PPSV23 or PCV20) Postponed until 8/4/2022 08/03/2021  Imm Admin: Pneumococcal Conjugate Vaccine (Prevnar/PCV-13)          Postponed - IMM DTaP/Tdap/Td Vaccine (1 - Tdap) Postponed until 6/15/2028    No completion history exists for this topic.          MAMMOGRAM (Yearly) Next due on 11/16/2022 11/16/2021  MA-SCREENING MAMMO BILAT W/TOMOSYNTHESIS W/CAD    11/10/2020  MA-SCREENING MAMMO BILAT W/TOMOSYNTHESIS W/CAD    01/07/2020  Done          Annual Wellness Visit (Every 366 Days) Next due on 6/30/2023 06/29/2022  Visit Dx: Medicare annual wellness visit, subsequent    08/12/2021  Visit Dx: Medicare annual wellness visit, subsequent          BONE DENSITY (Every 5 Years) Next due on 11/10/2025    11/10/2020  DS-BONE DENSITY STUDY (DEXA)          COLORECTAL CANCER SCREENING (COLONOSCOPY - Every 10 Years) Next due on 12/4/2029 12/04/2019  COLONOSCOPY (Done)          HEPATITIS C SCREENING  Completed    01/07/2020  HEP C VIRUS ANTIBODY          IMM ZOSTER VACCINES (Series Information) Completed     01/29/2021  Imm Admin: Zoster Vaccine Recombinant (RZV) (SHINGRIX)    09/22/2020  Imm Admin: Zoster Vaccine Recombinant (RZV) (SHINGRIX)          IMM INFLUENZA (Series Information) Completed    09/28/2021  Imm Admin: Influenza Vaccine Adult HD    08/27/2020  Imm Admin: Influenza Vaccine Quad Inj (Pf)    08/27/2020  Imm Admin: Influenza, Unspecified - HISTORICAL DATA          IMM HEP B VACCINE (Series Information) Aged Out    No completion history exists for this topic.          IMM MENINGOCOCCAL VACCINE (MCV4) (Series Information) Aged Out    No completion history exists for this topic.          Discontinued - PAP SMEAR  Discontinued    06/12/2019  Done                Patient Care Team:  Toro Almaraz M.D. as PCP - General (Family Medicine)  Stacia Cody M.D. as Attending Team Physician (Dermatology)  Amy Patel M.D. as Attending Team Physician (Interventional Cardiology)  TIARA Hernandez as Attending Team Physician (Dermatology)  Yasmin Simpson M.D. (Cardiovascular Disease (Cardiology))  Marisol Hopkins O.D. 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   Past  "Surgical History:   Procedure Laterality Date   • LUMBAR LAMINECTOMY DISKECTOMY     • LYMPH NODE EXCISION     • TONSILLECTOMY     • TONSILLECTOMY AND ADENOIDECTOMY     • TUBAL COAGULATION LAPAROSCOPIC BILATERAL         Exam:   /54 (BP Location: Right arm, Patient Position: Sitting, BP Cuff Size: Adult)   Pulse 84   Temp 36.3 °C (97.4 °F) (Temporal)   Resp 14   Ht 1.727 m (5' 8\")   Wt 84 kg (185 lb 3.2 oz)   SpO2 95%  Body mass index is 28.16 kg/m².    Hearing good.    Dentition not examined as patient, physician and staff all wearing masks.  Alert, oriented in no acute distress.  Eye contact is good, speech goal directed, affect calm  HEENT:   EOMI, PERRLA.     Neck:       Full range of motion. No JVD or carotid bruits appreciated. No cervical adenopathy appreciated. No thyromegaly or neck masses appreciated.  No retractions appreciated.  Lungs:     Clear to auscultation A&P with good air movement.   Heart:      Regular rate and rhythm normal S1 and S2 without murmur appreciated.  Strong and symmetric radial and DP pulses.  Abd:        Soft, bowel sounds positive, nontender. No bloating noted. No hepatosplenomegaly or mass appreciated. No pulsatile mass appreciated.  Ext:         Extremities show symmetric and full range of motion with normal strength. No cyanosis or clubbing appreciated. No peripheral edema appreciated.  Neuro:    Gait is normal. Patient is lucid, fluent and appropriate. No significant tremor appreciated.  Skin:       Exhibit no rashes, pigmented lesions or ulcerations. Biopsy area currently covered.      Assessment and Plan. The following treatment and monitoring plan is recommended:      1. Medicare annual wellness visit, subsequent  Her medical problems are currently stable    2. Essential hypertension  HTN - Chronic condition stable. Currently taking all meds as directed.   She is not taking baby aspirin daily.   She is monitoring BP at home.  She checks periodically, excellent " control.  Denies symptoms low BP: light-headed, tunnel-vision, unusual fatigue.   Denies symptoms high BP:pounding headache, visual changes, palpitations, flushed face.   Denies medicine side effects: unusual fatigue, slow heartbeat, foot/leg swelling, cough.  - losartan (COZAAR) 25 MG Tab; Take 1 Tablet by mouth every day.  Dispense: 90 Tablet; Refill: 3    3. LBBB (left bundle branch block)  This is been asymptomatic.  Stable problem.    4. LAKIA on CPAP  Patient does continue her sleep apnea treatment.  Stable problem.    5. Chemotherapy-induced peripheral neuropathy (HCC)/History of B-cell lymphoma  Patient has chronic chemotherapy-induced peripheral neuropathy from her B-cell lymphoma treatment many years ago.  She is now 10 years post cancer treatment with no recurrence.  The gabapentin does help her neuropathy pain.  Stable problem.    6. History of coccidioidomycosis  Patient does have history of Coccidioides.  She did live in the [place] years.  She has had no recurrence.  Stable problem.      Services suggested: No services needed at this time  Health Care Screening: Age-appropriate preventive services recommended by USPTF and ACIP covered by Medicare were discussed today. Services ordered if indicated and agreed upon by the patient.  Referrals offered: Community-based lifestyle interventions to reduce health risks and promote self-management and wellness, fall prevention, nutrition, physical activity, tobacco-use cessation, weight loss, and mental health services as per orders if indicated.    Discussion today about general wellness and lifestyle habits: discussed   · Prevent falls and reduce trip hazards; Cautioned about securing or removing rugs.  · Have a working fire alarm and carbon monoxide detector; has  · Engage in regular physical activity and social activities     Follow-up: Return in about 6 months (around 12/29/2022), or if symptoms worsen or fail to improve.

## 2022-07-12 ENCOUNTER — TELEPHONE (OUTPATIENT)
Dept: DERMATOLOGY | Facility: IMAGING CENTER | Age: 67
End: 2022-07-12
Payer: MEDICARE

## 2022-07-12 DIAGNOSIS — C44.722 SQUAMOUS CELL CARCINOMA OF SKIN OF RIGHT LOWER LIMB, INCLUDING HIP: ICD-10-CM

## 2022-07-12 NOTE — TELEPHONE ENCOUNTER
Spoke with pt regarding Bx results--SCC R pretibial region. Pt agrees to MOHS, referral placed, please process

## 2022-07-26 ENCOUNTER — APPOINTMENT (RX ONLY)
Dept: URBAN - METROPOLITAN AREA CLINIC 36 | Facility: CLINIC | Age: 67
Setting detail: DERMATOLOGY
End: 2022-07-26

## 2022-07-26 PROBLEM — C44.722 SQUAMOUS CELL CARCINOMA OF SKIN OF RIGHT LOWER LIMB, INCLUDING HIP: Status: ACTIVE | Noted: 2022-07-26

## 2022-07-26 PROCEDURE — 17313 MOHS 1 STAGE T/A/L: CPT

## 2022-07-26 PROCEDURE — 13121 CMPLX RPR S/A/L 2.6-7.5 CM: CPT

## 2022-07-26 PROCEDURE — ? MOHS SURGERY

## 2022-07-26 PROCEDURE — ? PRESCRIPTION

## 2022-07-26 RX ORDER — MINOCYCLINE HYDROCHLORIDE 100 MG/1
1 CAPSULE ORAL BID
Qty: 14 | Refills: 0 | Status: ERX | COMMUNITY
Start: 2022-07-26

## 2022-07-26 RX ADMIN — MINOCYCLINE HYDROCHLORIDE 1: 100 CAPSULE ORAL at 00:00

## 2022-07-26 NOTE — HPI: PROCEDURE (MOHS)
Has The Growth Been Previously Biopsied?: has been previously biopsied
Body Location Override (Optional): Right pretibial
Family Member: Brother

## 2022-07-26 NOTE — PROCEDURE: MOHS SURGERY
Eye Protection Verbiage: Before proceeding with the stage, a plastic scleral shield was inserted. The globe was anesthetized with a few drops of 1% lidocaine with 1:100,000 epinephrine. Then, an appropriate sized scleral shield was chosen and coated with lacrilube ointment. The shield was gently inserted and left in place for the duration of each stage. After the stage was completed, the shield was gently removed.
Wound Care (No Sutures): Petrolatum
Otolaryngologist Procedure Text (F): After obtaining clear surgical margins the patient was sent to otolaryngology for surgical repair.  The patient understands they will receive post-surgical care and follow-up from the referring physician's office.
Stage 4: Additional Anesthesia Type: 1% lidocaine with epinephrine
Quadrants Reporting?: 0
Hatchet Flap Text: The defect edges were debeveled with a #15 scalpel blade.  Given the location of the defect, shape of the defect and the proximity to free margins a hatchet flap was deemed most appropriate.  Using a sterile surgical marker, an appropriate hatchet flap was drawn incorporating the defect and placing the expected incisions within the relaxed skin tension lines where possible.    The area thus outlined was incised deep to adipose tissue with a #15 scalpel blade.  The skin margins were undermined to an appropriate distance in all directions utilizing iris scissors.
Mid-Level Procedure Text (E): After obtaining clear surgical margins the patient was sent to a mid-level provider for surgical repair.  The patient understands they will receive post-surgical care and follow-up from the mid-level provider.
Double O-Z Plasty Text: The defect edges were debeveled with a #15 scalpel blade.  Given the location of the defect, shape of the defect and the proximity to free margins a Double O-Z plasty (double transposition flap) was deemed most appropriate.  Using a sterile surgical marker, the appropriate transposition flaps were drawn incorporating the defect and placing the expected incisions within the relaxed skin tension lines where possible. The area thus outlined was incised deep to adipose tissue with a #15 scalpel blade.  The skin margins were undermined to an appropriate distance in all directions utilizing iris scissors.  Hemostasis was achieved with electrocautery.  The flaps were then transposed into place, one clockwise and the other counterclockwise, and anchored with interrupted buried subcutaneous sutures.
Repair Anesthesia Method: local infiltration
Asc Procedure Text (F): After obtaining clear surgical margins the patient was sent to an ASC for surgical repair.  The patient understands they will receive post-surgical care and follow-up from the ASC physician.
Show Special Stain Variables In The Stage Tabs: Yes
Double M-Plasty Intermediate Repair Preamble Text (Leave Blank If You Do Not Want): Undermining was performed with blunt dissection.
Use Subsequent Stages Verbiage?: No
Secondary Intention Text (Leave Blank If You Do Not Want): The defect will heal with secondary intention.
Transposition Flap Text: The defect edges were debeveled with a #15 scalpel blade.  Given the location of the defect and the proximity to free margins a transposition flap was deemed most appropriate.  Using a sterile surgical marker, an appropriate transposition flap was drawn incorporating the defect.    The area thus outlined was incised deep to adipose tissue with a #15 scalpel blade.  The skin margins were undermined to an appropriate distance in all directions utilizing iris scissors.
Undermining Location (Optional): in the superficial subcutaneous fat
Location Indication Override (Is Already Calculated Based On Selected Body Location): Area M
Partial Purse String (Intermediate) Text: Given the location of the defect and the characteristics of the surrounding skin an intermediate purse string closure was deemed most appropriate.  Undermining was performed circumfirentially around the surgical defect.  A purse string suture was then placed and tightened. Wound tension only allowed a partial closure of the circular defect.
Post-Care Instructions: I reviewed with the patient in detail post-care instructions. Patient is not to engage in any heavy lifting, exercise, or swimming for the next 14 days. Should the patient develop any fevers, chills, bleeding, severe pain patient will contact the office immediately.
Wound Care: Vaseline
Mart-1 - Positive Histology Text: MART-1 staining demonstrates areas of higher density and clustering of melanocytes with Pagetoid spread upwards within the epidermis. The surgical margins are positive for tumor cells.
Rhombic Flap Text: The defect edges were debeveled with a #15 scalpel blade.  Given the location of the defect and the proximity to free margins a rhombic flap was deemed most appropriate.  Using a sterile surgical marker, an appropriate rhombic flap was drawn incorporating the defect.    The area thus outlined was incised deep to adipose tissue with a #15 scalpel blade.  The skin margins were undermined to an appropriate distance in all directions utilizing iris scissors.
Trilobed Flap Text: The defect edges were debeveled with a #15 scalpel blade.  Given the location of the defect and the proximity to free margins a trilobed flap was deemed most appropriate.  Using a sterile surgical marker, an appropriate trilobed flap drawn around the defect.    The area thus outlined was incised deep to adipose tissue with a #15 scalpel blade.  The skin margins were undermined to an appropriate distance in all directions utilizing iris scissors.
Special Stains Stage 7 - Results: Base On Clearance Noted Above
Bilobed Transposition Flap Text: The defect edges were debeveled with a #15 scalpel blade.  Given the location of the defect and the proximity to free margins a bilobed transposition flap was deemed most appropriate.  Using a sterile surgical marker, an appropriate bilobe flap drawn around the defect.    The area thus outlined was incised deep to adipose tissue with a #15 scalpel blade.  The skin margins were undermined to an appropriate distance in all directions utilizing iris scissors.
Home Suture Removal Text: Patient was provided instructions on removing sutures and will remove their sutures at home.  If they have any questions or difficulties they will call the office.
O-Z Plasty Text: The defect edges were debeveled with a #15 scalpel blade.  Given the location of the defect, shape of the defect and the proximity to free margins an O-Z plasty (double transposition flap) was deemed most appropriate.  Using a sterile surgical marker, the appropriate transposition flaps were drawn incorporating the defect and placing the expected incisions within the relaxed skin tension lines where possible.    The area thus outlined was incised deep to adipose tissue with a #15 scalpel blade.  The skin margins were undermined to an appropriate distance in all directions utilizing iris scissors.  Hemostasis was achieved with electrocautery.  The flaps were then transposed into place, one clockwise and the other counterclockwise, and anchored with interrupted buried subcutaneous sutures.
Manual Repair Warning Statement: We plan on removing the manually selected variable below in favor of our much easier automatic structured text blocks found in the previous tab. We decided to do this to help make the flow better and give you the full power of structured data. Manual selection is never going to be ideal in our platform and I would encourage you to avoid using manual selection from this point on, especially since I will be sunsetting this feature. It is important that you do one of two things with the customized text below. First, you can save all of the text in a word file so you can have it for future reference. Second, transfer the text to the appropriate area in the Library tab. Lastly, if there is a flap or graft type which we do not have you need to let us know right away so I can add it in before the variable is hidden. No need to panic, we plan to give you roughly 6 months to make the change.
Dressing (No Sutures): pressure dressing with telfa
Melolabial Transposition Flap Text: The defect edges were debeveled with a #15 scalpel blade.  Given the location of the defect and the proximity to free margins a melolabial flap was deemed most appropriate.  Using a sterile surgical marker, an appropriate melolabial transposition flap was drawn incorporating the defect.    The area thus outlined was incised deep to adipose tissue with a #15 scalpel blade.  The skin margins were undermined to an appropriate distance in all directions utilizing iris scissors.
Provider Procedure Text (B): After obtaining clear surgical margins the defect was repaired by another provider.
O-Z Flap Text: The defect edges were debeveled with a #15 scalpel blade.  Given the location of the defect, shape of the defect and the proximity to free margins an O-Z flap was deemed most appropriate.  Using a sterile surgical marker, an appropriate transposition flap was drawn incorporating the defect and placing the expected incisions within the relaxed skin tension lines where possible. The area thus outlined was incised deep to adipose tissue with a #15 scalpel blade.  The skin margins were undermined to an appropriate distance in all directions utilizing iris scissors.
Oculoplastic Surgeon Procedure Text (F): After obtaining clear surgical margins the patient was sent to oculoplastics for surgical repair.  The patient understands they will receive post-surgical care and follow-up from the referring physician's office.
Graft Donor Site Dermal Sutures (Optional): 5-0 Polysorb
Plastic Surgeon Procedure Text (E): After obtaining clear surgical margins the patient was sent to plastics for surgical repair.  The patient understands they will receive post-surgical care and follow-up from the referring physician's office.
Retention Suture Bite Size: 1 mm
Tumor Depth: Less than 6mm from granular layer and no invasion beyond the subcutaneous fat
M-Plasty Complex Repair Preamble Text (Leave Blank If You Do Not Want): Extensive wide undermining was performed.
Subsequent Stages Histo Method Verbiage: Using a similar technique to that described above, a thin layer of tissue was removed from all areas where tumor was visible on the previous stage.  The tissue was again oriented, mapped, dyed, and processed as above.
Peng Advancement Flap Text: The defect edges were debeveled with a #15 scalpel blade.  Given the location of the defect, shape of the defect and the proximity to free margins a Peng advancement flap was deemed most appropriate.  Using a sterile surgical marker, an appropriate advancement flap was drawn incorporating the defect and placing the expected incisions within the relaxed skin tension lines where possible. The area thus outlined was incised deep to adipose tissue with a #15 scalpel blade.  The skin margins were undermined to an appropriate distance in all directions utilizing iris scissors.
Cheiloplasty (Less Than 50%) Text: A decision was made to reconstruct the defect with a  cheiloplasty.  The defect was undermined extensively.  Additional obicularis oris muscle was excised with a 15 blade scalpel.  The defect was converted into a full thickness wedge, of less than 50% of the vertical height of the lip, to facilite a better cosmetic result.  Small vessels were then tied off with 5-0 monocyrl. The obicularis oris, superficial fascia, adipose and dermis were then reapproximated.  After the deeper layers were approximated the epidermis was reapproximated with particular care given to realign the vermilion border.
Information: Selecting Yes will display possible errors in your note based on the variables you have selected. This validation is only offered as a suggestion for you. PLEASE NOTE THAT THE VALIDATION TEXT WILL BE REMOVED WHEN YOU FINALIZE YOUR NOTE. IF YOU WANT TO FAX A PRELIMINARY NOTE YOU WILL NEED TO TOGGLE THIS TO 'NO' IF YOU DO NOT WANT IT IN YOUR FAXED NOTE.
Consent (Nose)/Introductory Paragraph: The rationale for Mohs was explained to the patient and consent was obtained. The risks, benefits and alternatives to therapy were discussed in detail. Specifically, the risks of nasal deformity, changes in the flow of air through the nose, infection, scarring, bleeding, prolonged wound healing, incomplete removal, allergy to anesthesia, nerve injury and recurrence were addressed. Prior to the procedure, the treatment site was clearly identified and confirmed by the patient. All components of Universal Protocol/PAUSE Rule completed.
Advancement Flap (Double) Text: The defect edges were debeveled with a #15 scalpel blade.  Given the location of the defect and the proximity to free margins a double advancement flap was deemed most appropriate.  Using a sterile surgical marker, the appropriate advancement flaps were drawn incorporating the defect and placing the expected incisions within the relaxed skin tension lines where possible.    The area thus outlined was incised deep to adipose tissue with a #15 scalpel blade.  The skin margins were undermined to an appropriate distance in all directions utilizing iris scissors.
Was The Patient On Physician Recommended Anticoagulation Therapy?: Please Select the Appropriate Response
Epidermal Sutures: 3-0 Surgipro
Island Pedicle Flap Text: The defect edges were debeveled with a #15 scalpel blade.  Given the location of the defect, shape of the defect and the proximity to free margins an island pedicle advancement flap was deemed most appropriate.  Using a sterile surgical marker, an appropriate advancement flap was drawn incorporating the defect, outlining the appropriate donor tissue and placing the expected incisions within the relaxed skin tension lines where possible.    The area thus outlined was incised deep to adipose tissue with a #15 scalpel blade.  The skin margins were undermined to an appropriate distance in all directions around the primary defect and laterally outward around the island pedicle utilizing iris scissors.  There was minimal undermining beneath the pedicle flap.
Skin Substitute Text: The defect edges were debeveled with a #15 scalpel blade.  Given the location of the defect, shape of the defect and the proximity to free margins a skin substitute graft was deemed most appropriate.  The graft material was trimmed to fit the size of the defect. The graft was then placed in the primary defect and oriented appropriately.
Primary Defect Length In Cm (Final Defect Size - Required For Flaps/Grafts): 1.5
Epidermal Closure Graft Donor Site (Optional): running
Island Pedicle Flap-Requiring Vessel Identification Text: The defect edges were debeveled with a #15 scalpel blade.  Given the location of the defect, shape of the defect and the proximity to free margins an island pedicle advancement flap was deemed most appropriate.  Using a sterile surgical marker, an appropriate advancement flap was drawn, based on the axial vessel mentioned above, incorporating the defect, outlining the appropriate donor tissue and placing the expected incisions within the relaxed skin tension lines where possible.    The area thus outlined was incised deep to adipose tissue with a #15 scalpel blade.  The skin margins were undermined to an appropriate distance in all directions around the primary defect and laterally outward around the island pedicle utilizing iris scissors.  There was minimal undermining beneath the pedicle flap.
Split-Thickness Skin Graft Text: The defect edges were debeveled with a #15 scalpel blade.  Given the location of the defect, shape of the defect and the proximity to free margins a split thickness skin graft was deemed most appropriate.  Using a sterile surgical marker, the primary defect shape was transferred to the donor site. The split thickness graft was then harvested.  The skin graft was then placed in the primary defect and oriented appropriately.
H Plasty Text: Given the location of the defect, shape of the defect and the proximity to free margins a H-plasty was deemed most appropriate for repair.  Using a sterile surgical marker, the appropriate advancement arms of the H-plasty were drawn incorporating the defect and placing the expected incisions within the relaxed skin tension lines where possible. The area thus outlined was incised deep to adipose tissue with a #15 scalpel blade. The skin margins were undermined to an appropriate distance in all directions utilizing iris scissors.  The opposing advancement arms were then advanced into place in opposite direction and anchored with interrupted buried subcutaneous sutures.
Repair Hemostasis (Optional): Pinpoint electrocautery
Melolabial Interpolation Flap Text: A decision was made to reconstruct the defect utilizing an interpolation axial flap and a staged reconstruction.  A telfa template was made of the defect.  This telfa template was then used to outline the melolabial interpolation flap.  The donor area for the pedicle flap was then injected with anesthesia.  The flap was excised through the skin and subcutaneous tissue down to the layer of the underlying musculature.  The pedicle flap was carefully excised within this deep plane to maintain its blood supply.  The edges of the donor site were undermined.   The donor site was closed in a primary fashion.  The pedicle was then rotated into position and sutured.  Once the tube was sutured into place, adequate blood supply was confirmed with blanching and refill.  The pedicle was then wrapped with xeroform gauze and dressed appropriately with a telfa and gauze bandage to ensure continued blood supply and protect the attached pedicle.
Additional Anesthesia Volume In Cc: 6
Number Of Hemigard Strips Per Side: 1
Burow's Graft Text: The defect edges were debeveled with a #15 scalpel blade.  Given the location of the defect, shape of the defect, the proximity to free margins and the presence of a standing cone deformity a Burow's skin graft was deemed most appropriate. The standing cone was removed and this tissue was then trimmed to the shape of the primary defect. The adipose tissue was also removed until only dermis and epidermis were left.  The skin margins of the secondary defect were undermined to an appropriate distance in all directions utilizing iris scissors.  The secondary defect was closed with interrupted buried subcutaneous sutures.  The skin edges were then re-apposed with running  sutures.  The skin graft was then placed in the primary defect and oriented appropriately.
Tumor Debulked?: curette
Mohs Case Number: [id number]
Wound Check: 6 weeks
Nasal Turnover Hinge Flap Text: The defect edges were debeveled with a #15 scalpel blade.  Given the size, depth, location of the defect and the defect being full thickness a nasal turnover hinge flap was deemed most appropriate.  Using a sterile surgical marker, an appropriate hinge flap was drawn incorporating the defect. The area thus outlined was incised with a #15 scalpel blade. The flap was designed to recreate the nasal mucosal lining and the alar rim. The skin margins were undermined to an appropriate distance in all directions utilizing iris scissors.
Alternatives Discussed Intro (Do Not Add Period): I discussed alternative treatments to Mohs surgery and specifically discussed the risks and benefits of
Postop Diagnosis: same
Purse String (Simple) Text: Given the location of the defect and the characteristics of the surrounding skin a purse string closure was deemed most appropriate.  Undermining was performed circumfirentially around the surgical defect.  A purse string suture was then placed and tightened.
Vermilion Border Text: The closure involved the vermilion border.
Modified Advancement Flap Text: The defect edges were debeveled with a #15 scalpel blade.  Given the location of the defect, shape of the defect and the proximity to free margins a modified advancement flap was deemed most appropriate.  Using a sterile surgical marker, an appropriate advancement flap was drawn incorporating the defect and placing the expected incisions within the relaxed skin tension lines where possible.    The area thus outlined was incised deep to adipose tissue with a #15 scalpel blade.  The skin margins were undermined to an appropriate distance in all directions utilizing iris scissors.
S Plasty Text: Given the location and shape of the defect, and the orientation of relaxed skin tension lines, an S-plasty was deemed most appropriate for repair.  Using a sterile surgical marker, the appropriate outline of the S-plasty was drawn, incorporating the defect and placing the expected incisions within the relaxed skin tension lines where possible.  The area thus outlined was incised deep to adipose tissue with a #15 scalpel blade.  The skin margins were undermined to an appropriate distance in all directions utilizing iris scissors. The skin flaps were advanced over the defect.  The opposing margins were then approximated with interrupted buried subcutaneous sutures.
Hemigard Postcare Instructions: The HEMIGARD strips are to remain completely dry for at least 5-7 days.
Suturegard Retention Suture: 2-0 Nylon
Simple / Intermediate / Complex Repair - Final Wound Length In Cm: 2.8
Bi-Rhombic Flap Text: The defect edges were debeveled with a #15 scalpel blade.  Given the location of the defect and the proximity to free margins a bi-rhombic flap was deemed most appropriate.  Using a sterile surgical marker, an appropriate rhombic flap was drawn incorporating the defect. The area thus outlined was incised deep to adipose tissue with a #15 scalpel blade.  The skin margins were undermined to an appropriate distance in all directions utilizing iris scissors.
Keystone Flap Text: The defect edges were debeveled with a #15 scalpel blade.  Given the location of the defect, shape of the defect a keystone flap was deemed most appropriate.  Using a sterile surgical marker, an appropriate keystone flap was drawn incorporating the defect, outlining the appropriate donor tissue and placing the expected incisions within the relaxed skin tension lines where possible. The area thus outlined was incised deep to adipose tissue with a #15 scalpel blade.  The skin margins were undermined to an appropriate distance in all directions around the primary defect and laterally outward around the flap utilizing iris scissors.
Retention Suture Text: Retention sutures were placed to support the closure and prevent dehiscence.
Medical Necessity Statement: Based on my medical judgement, Mohs surgery is the most appropriate treatment for this cancer compared to other treatments.
V-Y Plasty Text: The defect edges were debeveled with a #15 scalpel blade.  Given the location of the defect, shape of the defect and the proximity to free margins an V-Y advancement flap was deemed most appropriate.  Using a sterile surgical marker, an appropriate advancement flap was drawn incorporating the defect and placing the expected incisions within the relaxed skin tension lines where possible.    The area thus outlined was incised deep to adipose tissue with a #15 scalpel blade.  The skin margins were undermined to an appropriate distance in all directions utilizing iris scissors.
Area M Indication Text: Tumors in this location are included in Area M (cheek, forehead, scalp, neck, jawline and pretibial skin).  Mohs surgery is indicated for tumors in these anatomic locations.
Graft Donor Site Epidermal Sutures (Optional): 5-0 Surgipro
Mauc Instructions: By selecting yes to the question below the MAUC number will be added into the note.  This will be calculated automatically based on the diagnosis chosen, the size entered, the body zone selected (H,M,L) and the specific indications you chose. You will also have the option to override the Mohs AUC if you disagree with the automatically calculated number and this option is found in the Case Summary tab.
Paramedian Forehead Flap Text: A decision was made to reconstruct the defect utilizing an interpolation axial flap and a staged reconstruction.  A telfa template was made of the defect.  This telfa template was then used to outline the paramedian forehead pedicle flap.  The donor area for the pedicle flap was then injected with anesthesia.  The flap was excised through the skin and subcutaneous tissue down to the layer of the underlying musculature.  The pedicle flap was carefully excised within this deep plane to maintain its blood supply.  The edges of the donor site were undermined.   The donor site was closed in a primary fashion.  The pedicle was then rotated into position and sutured.  Once the tube was sutured into place, adequate blood supply was confirmed with blanching and refill.  The pedicle was then wrapped with xeroform gauze and dressed appropriately with a telfa and gauze bandage to ensure continued blood supply and protect the attached pedicle.
Full Thickness Lip Wedge Repair (Flap) Text: Given the location of the defect and the proximity to free margins a full thickness wedge repair was deemed most appropriate.  Using a sterile surgical marker, the appropriate repair was drawn incorporating the defect and placing the expected incisions perpendicular to the vermilion border.  The vermilion border was also meticulously outlined to ensure appropriate reapproximation during the repair.  The area thus outlined was incised through and through with a #15 scalpel blade.  The muscularis and dermis were reaproximated with deep sutures following hemostasis. Care was taken to realign the vermilion border before proceeding with the superficial closure.  Once the vermilion was realigned the superfical and mucosal closure was finished.
Advancement Flap (Single) Text: The defect edges were debeveled with a #15 scalpel blade.  Given the location of the defect and the proximity to free margins a single advancement flap was deemed most appropriate.  Using a sterile surgical marker, an appropriate advancement flap was drawn incorporating the defect and placing the expected incisions within the relaxed skin tension lines where possible.    The area thus outlined was incised deep to adipose tissue with a #15 scalpel blade.  The skin margins were undermined to an appropriate distance in all directions utilizing iris scissors.
Spiral Flap Text: The defect edges were debeveled with a #15 scalpel blade.  Given the location of the defect, shape of the defect and the proximity to free margins a spiral flap was deemed most appropriate.  Using a sterile surgical marker, an appropriate rotation flap was drawn incorporating the defect and placing the expected incisions within the relaxed skin tension lines where possible. The area thus outlined was incised deep to adipose tissue with a #15 scalpel blade.  The skin margins were undermined to an appropriate distance in all directions utilizing iris scissors.
Cheiloplasty (Complex) Text: A decision was made to reconstruct the defect with a  cheiloplasty.  The defect was undermined extensively.  Additional obicularis oris muscle was excised with a 15 blade scalpel.  The defect was converted into a full thickness wedge to facilite a better cosmetic result.  Small vessels were then tied off with 5-0 monocyrl. The obicularis oris, superficial fascia, adipose and dermis were then reapproximated.  After the deeper layers were approximated the epidermis was reapproximated with particular care given to realign the vermilion border.
Xenograft Text: The defect edges were debeveled with a #15 scalpel blade.  Given the location of the defect, shape of the defect and the proximity to free margins a xenograft was deemed most appropriate.  The graft was then trimmed to fit the size of the defect.  The graft was then placed in the primary defect and oriented appropriately.
Rotation Flap Text: The defect edges were debeveled with a #15 scalpel blade.  Given the location of the defect, shape of the defect and the proximity to free margins a rotation flap was deemed most appropriate.  Using a sterile surgical marker, an appropriate rotation flap was drawn incorporating the defect and placing the expected incisions within the relaxed skin tension lines where possible.    The area thus outlined was incised deep to adipose tissue with a #15 scalpel blade.  The skin margins were undermined to an appropriate distance in all directions utilizing iris scissors.
Epidermal Autograft Text: The defect edges were debeveled with a #15 scalpel blade.  Given the location of the defect, shape of the defect and the proximity to free margins an epidermal autograft was deemed most appropriate.  Using a sterile surgical marker, the primary defect shape was transferred to the donor site. The epidermal graft was then harvested.  The skin graft was then placed in the primary defect and oriented appropriately.
Ear Star Wedge Flap Text: The defect edges were debeveled with a #15 blade scalpel.  Given the location of the defect and the proximity to free margins (helical rim) an ear star wedge flap was deemed most appropriate.  Using a sterile surgical marker, the appropriate flap was drawn incorporating the defect and placing the expected incisions between the helical rim and antihelix where possible.  The area thus outlined was incised through and through with a #15 scalpel blade.
Suturegard Body: The suture ends were repeatedly re-tightened and re-clamped to achieve the desired tissue expansion.
Nasalis-Muscle-Based Myocutaneous Island Pedicle Flap Text: Using a #15 blade, an incision was made around the donor flap to the level of the nasalis muscle. Wide lateral undermining was then performed in both the subcutaneous plane above the nasalis muscle, and in a submuscular plane just above periosteum. This allowed the formation of a free nasalis muscle axial pedicle (based on the angular artery) which was still attached to the actual cutaneous flap, increasing its mobility and vascular viability. Hemostasis was obtained with pinpoint electrocoagulation. The flap was mobilized into position and the pivotal anchor points positioned and stabilized with buried interrupted sutures. Subcutaneous and dermal tissues were closed in a multilayered fashion with sutures. Tissue redundancies were excised, and the epidermal edges were apposed without significant tension and sutured with sutures.
Helical Rim Text: The closure involved the helical rim.
Brow Lift Text: A midfrontal incision was made medially to the defect to allow access to the tissues just superior to the left eyebrow. Following careful dissection inferiorly in a supraperiosteal plane to the level of the left eyebrow, several 3-0 monocryl sutures were used to resuspend the eyebrow orbicularis oculi muscular unit to the superior frontal bone periosteum. This resulted in an appropriate reapproximation of static eyebrow symmetry and correction of the left brow ptosis.
Consent Type: Consent 1 (Standard)
Bilateral Helical Rim Advancement Flap Text: The defect edges were debeveled with a #15 blade scalpel.  Given the location of the defect and the proximity to free margins (helical rim) a bilateral helical rim advancement flap was deemed most appropriate.  Using a sterile surgical marker, the appropriate advancement flaps were drawn incorporating the defect and placing the expected incisions between the helical rim and antihelix where possible.  The area thus outlined was incised through and through with a #15 scalpel blade.  With a skin hook and iris scissors, the flaps were gently and sharply undermined and freed up.
Bcc Histology Text: There were numerous aggregates of basaloid cells.
No Repair - Repaired With Adjacent Surgical Defect Text (Leave Blank If You Do Not Want): After obtaining clear surgical margins the defect was repaired concurrently with another surgical defect which was in close approximation.
Nostril Rim Text: The closure involved the nostril rim.
Crescentic Advancement Flap Text: The defect edges were debeveled with a #15 scalpel blade.  Given the location of the defect and the proximity to free margins a crescentic advancement flap was deemed most appropriate.  Using a sterile surgical marker, the appropriate advancement flap was drawn incorporating the defect and placing the expected incisions within the relaxed skin tension lines where possible.    The area thus outlined was incised deep to adipose tissue with a #15 scalpel blade.  The skin margins were undermined to an appropriate distance in all directions utilizing iris scissors.
Partial Purse String (Simple) Text: Given the location of the defect and the characteristics of the surrounding skin a simple purse string closure was deemed most appropriate.  Undermining was performed circumfirentially around the surgical defect.  A purse string suture was then placed and tightened. Wound tension only allowed a partial closure of the circular defect.
Suture Removal: 14 days
Hemigard Intro: Due to skin fragility and wound tension, it was decided to use HEMIGARD adhesive retention suture devices to permit a linear closure. The skin was cleaned and dried for a 6cm distance away from the wound. Excessive hair, if present, was removed to allow for adhesion.
Complex Repair And Graft Additional Text (Will Appearing After The Standard Complex Repair Text): The complex repair was not sufficient to completely close the primary defect. The remaining additional defect was repaired with the graft mentioned below.
Banner Transposition Flap Text: The defect edges were debeveled with a #15 scalpel blade.  Given the location of the defect and the proximity to free margins a Banner transposition flap was deemed most appropriate.  Using a sterile surgical marker, an appropriate flap drawn around the defect. The area thus outlined was incised deep to adipose tissue with a #15 scalpel blade.  The skin margins were undermined to an appropriate distance in all directions utilizing iris scissors.
Consent (Lip)/Introductory Paragraph: The rationale for Mohs was explained to the patient and consent was obtained. The risks, benefits and alternatives to therapy were discussed in detail. Specifically, the risks of lip deformity, changes in the oral aperture, infection, scarring, bleeding, prolonged wound healing, incomplete removal, allergy to anesthesia, nerve injury and recurrence were addressed. Prior to the procedure, the treatment site was clearly identified and confirmed by the patient. All components of Universal Protocol/PAUSE Rule completed.
Composite Graft Text: The defect edges were debeveled with a #15 scalpel blade.  Given the location of the defect, shape of the defect, the proximity to free margins and the fact the defect was full thickness a composite graft was deemed most appropriate.  The defect was outline and then transferred to the donor site.  A full thickness graft was then excised from the donor site. The graft was then placed in the primary defect, oriented appropriately and then sutured into place.  The secondary defect was then repaired using a primary closure.
Donor Site Anesthesia Type: same as repair anesthesia
Estimated Blood Loss (Cc): minimal
Localized Dermabrasion With Wire Brush Text: The patient was draped in routine manner.  Localized dermabrasion using 3 x 17 mm wire brush was performed in routine manner to papillary dermis. This spot dermabrasion is being performed to complete skin cancer reconstruction. It also will eliminate the other sun damaged precancerous cells that are known to be part of the regional effect of a lifetime's worth of sun exposure. This localized dermabrasion is therapeutic and should not be considered cosmetic in any regard.
Closure 3 Information: This tab is for additional flaps and grafts above and beyond our usual structured repairs.  Please note if you enter information here it will not currently bill and you will need to add the billing information manually.
Interpolation Flap Text: A decision was made to reconstruct the defect utilizing an interpolation axial flap and a staged reconstruction.  A telfa template was made of the defect.  This telfa template was then used to outline the interpolation flap.  The donor area for the pedicle flap was then injected with anesthesia.  The flap was excised through the skin and subcutaneous tissue down to the layer of the underlying musculature.  The interpolation flap was carefully excised within this deep plane to maintain its blood supply.  The edges of the donor site were undermined.   The donor site was closed in a primary fashion.  The pedicle was then rotated into position and sutured.  Once the tube was sutured into place, adequate blood supply was confirmed with blanching and refill.  The pedicle was then wrapped with xeroform gauze and dressed appropriately with a telfa and gauze bandage to ensure continued blood supply and protect the attached pedicle.
Chonodrocutaneous Helical Advancement Flap Text: The defect edges were debeveled with a #15 scalpel blade.  Given the location of the defect and the proximity to free margins a chondrocutaneous helical advancement flap was deemed most appropriate.  Using a sterile surgical marker, the appropriate advancement flap was drawn incorporating the defect and placing the expected incisions within the relaxed skin tension lines where possible.    The area thus outlined was incised deep to adipose tissue with a #15 scalpel blade.  The skin margins were undermined to an appropriate distance in all directions utilizing iris scissors.
Detail Level: Detailed
Island Pedicle Flap With Canthal Suspension Text: The defect edges were debeveled with a #15 scalpel blade.  Given the location of the defect, shape of the defect and the proximity to free margins an island pedicle advancement flap was deemed most appropriate.  Using a sterile surgical marker, an appropriate advancement flap was drawn incorporating the defect, outlining the appropriate donor tissue and placing the expected incisions within the relaxed skin tension lines where possible. The area thus outlined was incised deep to adipose tissue with a #15 scalpel blade.  The skin margins were undermined to an appropriate distance in all directions around the primary defect and laterally outward around the island pedicle utilizing iris scissors.  There was minimal undermining beneath the pedicle flap. A suspension suture was placed in the canthal tendon to prevent tension and prevent ectropion.
O-L Flap Text: The defect edges were debeveled with a #15 scalpel blade.  Given the location of the defect, shape of the defect and the proximity to free margins an O-L flap was deemed most appropriate.  Using a sterile surgical marker, an appropriate advancement flap was drawn incorporating the defect and placing the expected incisions within the relaxed skin tension lines where possible.    The area thus outlined was incised deep to adipose tissue with a #15 scalpel blade.  The skin margins were undermined to an appropriate distance in all directions utilizing iris scissors.
Consent 2/Introductory Paragraph: Mohs surgery was explained to the patient and consent was obtained. The risks, benefits and alternatives to therapy were discussed in detail. Specifically, the risks of infection, scarring, bleeding, prolonged wound healing, incomplete removal, allergy to anesthesia, nerve injury and recurrence were addressed. Prior to the procedure, the treatment site was clearly identified and confirmed by the patient. All components of Universal Protocol/PAUSE Rule completed.
Area H Indication Text: Tumors in this location are included in Area H (eyelids, eyebrows, nose, lips, chin, ear, pre-auricular, post-auricular, temple, genitalia, hands, feet, ankles and areola).  Tissue conservation is critical in these anatomic locations.
Surgeon/Pathologist Verbiage (Will Incorporate Name Of Surgeon From Intro If Not Blank): operated in two distinct and integrated capacities as the surgeon and pathologist.
Zygomaticofacial Flap Text: Given the location of the defect, shape of the defect and the proximity to free margins a zygomaticofacial flap was deemed most appropriate for repair.  Using a sterile surgical marker, the appropriate flap was drawn incorporating the defect and placing the expected incisions within the relaxed skin tension lines where possible. The area thus outlined was incised deep to adipose tissue with a #15 scalpel blade with preservation of a vascular pedicle.  The skin margins were undermined to an appropriate distance in all directions utilizing iris scissors.  The flap was then placed into the defect and anchored with interrupted buried subcutaneous sutures.
Consent 1/Introductory Paragraph: The rationale for Mohs was explained to the patient and consent was obtained. The risks, benefits and alternatives to therapy were discussed in detail. Specifically, the risks of infection, scarring, bleeding, prolonged wound healing, incomplete removal, allergy to anesthesia, nerve injury and recurrence were addressed. Prior to the procedure, the treatment site was clearly identified and confirmed by the patient. All components of Universal Protocol/PAUSE Rule completed.
Mohs Rapid Report Verbiage: The area of clinically evident tumor was marked with skin marking ink and appropriately hatched.  The initial incision was made following the Mohs approach through the skin.  The specimen was taken to the lab, divided into the necessary number of pieces, chromacoded and processed according to the Mohs protocol.  This was repeated in successive stages until a tumor free defect was achieved.
Cartilage Graft Text: The defect edges were debeveled with a #15 scalpel blade.  Given the location of the defect, shape of the defect, the fact the defect involved a full thickness cartilage defect a cartilage graft was deemed most appropriate.  An appropriate donor site was identified, cleansed, and anesthetized. The cartilage graft was then harvested and transferred to the recipient site, oriented appropriately and then sutured into place.  The secondary defect was then repaired using a primary closure.
Bcc Infiltrative Histology Text: There were numerous aggregates of basaloid cells demonstrating an infiltrative pattern.
Area L Indication Text: Tumors in this location are included in Area L (trunk and extremities).  Mohs surgery is indicated for larger tumors, or tumors with aggressive histologic features, in these anatomic locations.
Star Wedge Flap Text: The defect edges were debeveled with a #15 scalpel blade.  Given the location of the defect, shape of the defect and the proximity to free margins a star wedge flap was deemed most appropriate.  Using a sterile surgical marker, an appropriate rotation flap was drawn incorporating the defect and placing the expected incisions within the relaxed skin tension lines where possible. The area thus outlined was incised deep to adipose tissue with a #15 scalpel blade.  The skin margins were undermined to an appropriate distance in all directions utilizing iris scissors.
O-T Plasty Text: The defect edges were debeveled with a #15 scalpel blade.  Given the location of the defect, shape of the defect and the proximity to free margins an O-T plasty was deemed most appropriate.  Using a sterile surgical marker, an appropriate O-T plasty was drawn incorporating the defect and placing the expected incisions within the relaxed skin tension lines where possible.    The area thus outlined was incised deep to adipose tissue with a #15 scalpel blade.  The skin margins were undermined to an appropriate distance in all directions utilizing iris scissors.
Referring Physician (Optional): Marilu BROWN
Z Plasty Text: The lesion was extirpated to the level of the fat with a #15 scalpel blade.  Given the location of the defect, shape of the defect and the proximity to free margins a Z-plasty was deemed most appropriate for repair.  Using a sterile surgical marker, the appropriate transposition arms of the Z-plasty were drawn incorporating the defect and placing the expected incisions within the relaxed skin tension lines where possible.    The area thus outlined was incised deep to adipose tissue with a #15 scalpel blade.  The skin margins were undermined to an appropriate distance in all directions utilizing iris scissors.  The opposing transposition arms were then transposed into place in opposite direction and anchored with interrupted buried subcutaneous sutures.
A-T Advancement Flap Text: The defect edges were debeveled with a #15 scalpel blade.  Given the location of the defect, shape of the defect and the proximity to free margins an A-T advancement flap was deemed most appropriate.  Using a sterile surgical marker, an appropriate advancement flap was drawn incorporating the defect and placing the expected incisions within the relaxed skin tension lines where possible.    The area thus outlined was incised deep to adipose tissue with a #15 scalpel blade.  The skin margins were undermined to an appropriate distance in all directions utilizing iris scissors.
Helical Rim Advancement Flap Text: The defect edges were debeveled with a #15 blade scalpel.  Given the location of the defect and the proximity to free margins (helical rim) a double helical rim advancement flap was deemed most appropriate.  Using a sterile surgical marker, the appropriate advancement flaps were drawn incorporating the defect and placing the expected incisions between the helical rim and antihelix where possible.  The area thus outlined was incised through and through with a #15 scalpel blade.  With a skin hook and iris scissors, the flaps were gently and sharply undermined and freed up.
Date Of Previous Biopsy (Optional): 6-27-22
Consent (Scalp)/Introductory Paragraph: The rationale for Mohs was explained to the patient and consent was obtained. The risks, benefits and alternatives to therapy were discussed in detail. Specifically, the risks of changes in hair growth pattern secondary to repair, infection, scarring, bleeding, prolonged wound healing, incomplete removal, allergy to anesthesia, nerve injury and recurrence were addressed. Prior to the procedure, the treatment site was clearly identified and confirmed by the patient. All components of Universal Protocol/PAUSE Rule completed.
Repair Type: Complex Repair
Mastoid Interpolation Flap Text: A decision was made to reconstruct the defect utilizing an interpolation axial flap and a staged reconstruction.  A telfa template was made of the defect.  This telfa template was then used to outline the mastoid interpolation flap.  The donor area for the pedicle flap was then injected with anesthesia.  The flap was excised through the skin and subcutaneous tissue down to the layer of the underlying musculature.  The pedicle flap was carefully excised within this deep plane to maintain its blood supply.  The edges of the donor site were undermined.   The donor site was closed in a primary fashion.  The pedicle was then rotated into position and sutured.  Once the tube was sutured into place, adequate blood supply was confirmed with blanching and refill.  The pedicle was then wrapped with xeroform gauze and dressed appropriately with a telfa and gauze bandage to ensure continued blood supply and protect the attached pedicle.
Tarsorrhaphy Text: A tarsorrhaphy was performed using Frost sutures.
Non-Graft Cartilage Fenestration Text: The cartilage was fenestrated with a 2mm punch biopsy to help facilitate healing.
Advancement-Rotation Flap Text: The defect edges were debeveled with a #15 scalpel blade.  Given the location of the defect, shape of the defect and the proximity to free margins an advancement-rotation flap was deemed most appropriate.  Using a sterile surgical marker, an appropriate flap was drawn incorporating the defect and placing the expected incisions within the relaxed skin tension lines where possible. The area thus outlined was incised deep to adipose tissue with a #15 scalpel blade.  The skin margins were undermined to an appropriate distance in all directions utilizing iris scissors.
Initial Size Of Lesion: 1.1
Ftsg Text: The defect edges were debeveled with a #15 scalpel blade.  Given the location of the defect, shape of the defect and the proximity to free margins a full thickness skin graft was deemed most appropriate.  Using a sterile surgical marker, the primary defect shape was transferred to the donor site. The area thus outlined was incised deep to adipose tissue with a #15 scalpel blade.  The harvested graft was then trimmed of adipose tissue until only dermis and epidermis was left.  The skin margins of the secondary defect were undermined to an appropriate distance in all directions utilizing iris scissors.  The secondary defect was closed with interrupted buried subcutaneous sutures.  The skin edges were then re-apposed with running  sutures.  The skin graft was then placed in the primary defect and oriented appropriately.
No Residual Tumor Seen Histology Text: There were no malignant cells seen in the sections examined.
Same Histology In Subsequent Stages Text: The pattern and morphology of the tumor is as described in the first stage.
Dorsal Nasal Flap Text: The defect edges were debeveled with a #15 scalpel blade.  Given the location of the defect and the proximity to free margins a dorsal nasal flap was deemed most appropriate.  Using a sterile surgical marker, an appropriate dorsal nasal flap was drawn around the defect.    The area thus outlined was incised deep to adipose tissue with a #15 scalpel blade.  The skin margins were undermined to an appropriate distance in all directions utilizing iris scissors.
Orbicularis Oris Muscle Flap Text: The defect edges were debeveled with a #15 scalpel blade.  Given that the defect affected the competency of the oral sphincter an orbicularis oris muscle flap was deemed most appropriate to restore this competency and normal muscle function.  Using a sterile surgical marker, an appropriate flap was drawn incorporating the defect. The area thus outlined was incised with a #15 scalpel blade.
Unna Boot Text: An Unna boot was placed to help immobilize the limb and facilitate more rapid healing.
Undermining Type: Entire Wound
Mart-1 - Negative Histology Text: MART-1 staining demonstrates a normal density and pattern of melanocytes along the dermal-epidermal junction. The surgical margins are negative for tumor cells.
Bilobed Flap Text: The defect edges were debeveled with a #15 scalpel blade.  Given the location of the defect and the proximity to free margins a bilobe flap was deemed most appropriate.  Using a sterile surgical marker, an appropriate bilobe flap drawn around the defect.    The area thus outlined was incised deep to adipose tissue with a #15 scalpel blade.  The skin margins were undermined to an appropriate distance in all directions utilizing iris scissors.
Mercedes Flap Text: The defect edges were debeveled with a #15 scalpel blade.  Given the location of the defect, shape of the defect and the proximity to free margins a Mercedes flap was deemed most appropriate.  Using a sterile surgical marker, an appropriate advancement flap was drawn incorporating the defect and placing the expected incisions within the relaxed skin tension lines where possible. The area thus outlined was incised deep to adipose tissue with a #15 scalpel blade.  The skin margins were undermined to an appropriate distance in all directions utilizing iris scissors.
Surgeon Performing Repair (Optional): Lois Thompson MD
Burow's Advancement Flap Text: The defect edges were debeveled with a #15 scalpel blade.  Given the location of the defect and the proximity to free margins a Burow's advancement flap was deemed most appropriate.  Using a sterile surgical marker, the appropriate advancement flap was drawn incorporating the defect and placing the expected incisions within the relaxed skin tension lines where possible.    The area thus outlined was incised deep to adipose tissue with a #15 scalpel blade.  The skin margins were undermined to an appropriate distance in all directions utilizing iris scissors.
Mucosal Advancement Flap Text: Given the location of the defect, shape of the defect and the proximity to free margins a mucosal advancement flap was deemed most appropriate. Incisions were made with a 15 blade scalpel in the appropriate fashion along the cutaneous vermilion border and the mucosal lip. The remaining actinically damaged mucosal tissue was excised.  The mucosal advancement flap was then elevated to the gingival sulcus with care taken to preserve the neurovascular structures and advanced into the primary defect. Care was taken to ensure that precise realignment of the vermilion border was achieved.
Purse String (Intermediate) Text: Given the location of the defect and the characteristics of the surrounding skin a purse string intermediate closure was deemed most appropriate.  Undermining was performed circumfirentially around the surgical defect.  A purse string suture was then placed and tightened.
Posterior Auricular Interpolation Flap Text: A decision was made to reconstruct the defect utilizing an interpolation axial flap and a staged reconstruction.  A telfa template was made of the defect.  This telfa template was then used to outline the posterior auricular interpolation flap.  The donor area for the pedicle flap was then injected with anesthesia.  The flap was excised through the skin and subcutaneous tissue down to the layer of the underlying musculature.  The pedicle flap was carefully excised within this deep plane to maintain its blood supply.  The edges of the donor site were undermined.   The donor site was closed in a primary fashion.  The pedicle was then rotated into position and sutured.  Once the tube was sutured into place, adequate blood supply was confirmed with blanching and refill.  The pedicle was then wrapped with xeroform gauze and dressed appropriately with a telfa and gauze bandage to ensure continued blood supply and protect the attached pedicle.
V-Y Flap Text: The defect edges were debeveled with a #15 scalpel blade.  Given the location of the defect, shape of the defect and the proximity to free margins a V-Y flap was deemed most appropriate.  Using a sterile surgical marker, an appropriate advancement flap was drawn incorporating the defect and placing the expected incisions within the relaxed skin tension lines where possible.    The area thus outlined was incised deep to adipose tissue with a #15 scalpel blade.  The skin margins were undermined to an appropriate distance in all directions utilizing iris scissors.
Rhomboid Transposition Flap Text: The defect edges were debeveled with a #15 scalpel blade.  Given the location of the defect and the proximity to free margins a rhomboid transposition flap was deemed most appropriate.  Using a sterile surgical marker, an appropriate rhomboid flap was drawn incorporating the defect.    The area thus outlined was incised deep to adipose tissue with a #15 scalpel blade.  The skin margins were undermined to an appropriate distance in all directions utilizing iris scissors.
Cheek Interpolation Flap Text: A decision was made to reconstruct the defect utilizing an interpolation axial flap and a staged reconstruction.  A telfa template was made of the defect.  This telfa template was then used to outline the Cheek Interpolation flap.  The donor area for the pedicle flap was then injected with anesthesia.  The flap was excised through the skin and subcutaneous tissue down to the layer of the underlying musculature.  The interpolation flap was carefully excised within this deep plane to maintain its blood supply.  The edges of the donor site were undermined.   The donor site was closed in a primary fashion.  The pedicle was then rotated into position and sutured.  Once the tube was sutured into place, adequate blood supply was confirmed with blanching and refill.  The pedicle was then wrapped with xeroform gauze and dressed appropriately with a telfa and gauze bandage to ensure continued blood supply and protect the attached pedicle.
Consent (Ear)/Introductory Paragraph: The rationale for Mohs was explained to the patient and consent was obtained. The risks, benefits and alternatives to therapy were discussed in detail. Specifically, the risks of ear deformity, infection, scarring, bleeding, prolonged wound healing, incomplete removal, allergy to anesthesia, nerve injury and recurrence were addressed. Prior to the procedure, the treatment site was clearly identified and confirmed by the patient. All components of Universal Protocol/PAUSE Rule completed.
Consent (Spinal Accessory)/Introductory Paragraph: The rationale for Mohs was explained to the patient and consent was obtained. The risks, benefits and alternatives to therapy were discussed in detail. Specifically, the risks of damage to the spinal accessory nerve, infection, scarring, bleeding, prolonged wound healing, incomplete removal, allergy to anesthesia, and recurrence were addressed. Prior to the procedure, the treatment site was clearly identified and confirmed by the patient. All components of Universal Protocol/PAUSE Rule completed.
Muscle Hinge Flap Text: The defect edges were debeveled with a #15 scalpel blade.  Given the size, depth and location of the defect and the proximity to free margins a muscle hinge flap was deemed most appropriate.  Using a sterile surgical marker, an appropriate hinge flap was drawn incorporating the defect. The area thus outlined was incised with a #15 scalpel blade.  The skin margins were undermined to an appropriate distance in all directions utilizing iris scissors.
Where Do You Want The Question To Include Opioid Counseling Located?: Case Summary Tab
Consent (Near Eyelid Margin)/Introductory Paragraph: The rationale for Mohs was explained to the patient and consent was obtained. The risks, benefits and alternatives to therapy were discussed in detail. Specifically, the risks of ectropion or eyelid deformity, infection, scarring, bleeding, prolonged wound healing, incomplete removal, allergy to anesthesia, nerve injury and recurrence were addressed. Prior to the procedure, the treatment site was clearly identified and confirmed by the patient. All components of Universal Protocol/PAUSE Rule completed.
Dermal Autograft Text: The defect edges were debeveled with a #15 scalpel blade.  Given the location of the defect, shape of the defect and the proximity to free margins a dermal autograft was deemed most appropriate.  Using a sterile surgical marker, the primary defect shape was transferred to the donor site. The area thus outlined was incised deep to adipose tissue with a #15 scalpel blade.  The harvested graft was then trimmed of adipose and epidermal tissue until only dermis was left.  The skin graft was then placed in the primary defect and oriented appropriately.
Consent 3/Introductory Paragraph: I gave the patient a chance to ask questions they had about the procedure.  Following this I explained the Mohs procedure and consent was obtained. The risks, benefits and alternatives to therapy were discussed in detail. Specifically, the risks of infection, scarring, bleeding, prolonged wound healing, incomplete removal, allergy to anesthesia, nerve injury and recurrence were addressed. Prior to the procedure, the treatment site was clearly identified and confirmed by the patient. All components of Universal Protocol/PAUSE Rule completed.
Cheek-To-Nose Interpolation Flap Text: A decision was made to reconstruct the defect utilizing an interpolation axial flap and a staged reconstruction.  A telfa template was made of the defect.  This telfa template was then used to outline the Cheek-To-Nose Interpolation flap.  The donor area for the pedicle flap was then injected with anesthesia.  The flap was excised through the skin and subcutaneous tissue down to the layer of the underlying musculature.  The interpolation flap was carefully excised within this deep plane to maintain its blood supply.  The edges of the donor site were undermined.   The donor site was closed in a primary fashion.  The pedicle was then rotated into position and sutured.  Once the tube was sutured into place, adequate blood supply was confirmed with blanching and refill.  The pedicle was then wrapped with xeroform gauze and dressed appropriately with a telfa and gauze bandage to ensure continued blood supply and protect the attached pedicle.
Double O-Z Flap Text: The defect edges were debeveled with a #15 scalpel blade.  Given the location of the defect, shape of the defect and the proximity to free margins a Double O-Z flap was deemed most appropriate.  Using a sterile surgical marker, an appropriate transposition flap was drawn incorporating the defect and placing the expected incisions within the relaxed skin tension lines where possible. The area thus outlined was incised deep to adipose tissue with a #15 scalpel blade.  The skin margins were undermined to an appropriate distance in all directions utilizing iris scissors.
Staging Info: By selecting yes to the question above you will include information on AJCC 8 tumor staging in your Mohs note. Information on tumor staging will be automatically added for SCCs on the head and neck. AJCC 8 includes tumor size, tumor depth, perineural involvement and bone invasion.
Mohs Method Verbiage: An incision at a 45 degree angle following the standard Mohs approach was done and the specimen was harvested as a microscopic controlled layer.
Hemigard Retention Suture: 0-0 Nylon
O-T Advancement Flap Text: The defect edges were debeveled with a #15 scalpel blade.  Given the location of the defect, shape of the defect and the proximity to free margins an O-T advancement flap was deemed most appropriate.  Using a sterile surgical marker, an appropriate advancement flap was drawn incorporating the defect and placing the expected incisions within the relaxed skin tension lines where possible.    The area thus outlined was incised deep to adipose tissue with a #15 scalpel blade.  The skin margins were undermined to an appropriate distance in all directions utilizing iris scissors.
Previous Accession (Optional): outside path
Consent (Marginal Mandibular)/Introductory Paragraph: The rationale for Mohs was explained to the patient and consent was obtained. The risks, benefits and alternatives to therapy were discussed in detail. Specifically, the risks of damage to the marginal mandibular branch of the facial nerve, infection, scarring, bleeding, prolonged wound healing, incomplete removal, allergy to anesthesia, and recurrence were addressed. Prior to the procedure, the treatment site was clearly identified and confirmed by the patient. All components of Universal Protocol/PAUSE Rule completed.
Closure 2 Information: This tab is for additional flaps and grafts, including complex repair and grafts and complex repair and flaps. You can also specify a different location for the additional defect, if the location is the same you do not need to select a new one. We will insert the automated text for the repair you select below just as we do for solitary flaps and grafts. Please note that at this time if you select a location with a different insurance zone you will need to override the ICD10 and CPT if appropriate.
Ear Wedge Repair Text: A wedge excision was completed by carrying down an excision through the full thickness of the ear and cartilage with an inward facing Burow's triangle. The wound was then closed in a layered fashion.
Debridement Text: The wound edges were debrided prior to proceeding with the closure to facilitate wound healing.
X Size Of Lesion In Cm (Optional): 0.9
Tissue Cultured Epidermal Autograft Text: The defect edges were debeveled with a #15 scalpel blade.  Given the location of the defect, shape of the defect and the proximity to free margins a tissue cultured epidermal autograft was deemed most appropriate.  The graft was then trimmed to fit the size of the defect.  The graft was then placed in the primary defect and oriented appropriately.
Graft Cartilage Fenestration Text: The cartilage was fenestrated with a 2mm punch biopsy to help facilitate graft survival and healing.
Anesthesia Volume In Cc: 9
Epidermal Closure: running and interrupted
W Plasty Text: The lesion was extirpated to the level of the fat with a #15 scalpel blade.  Given the location of the defect, shape of the defect and the proximity to free margins a W-plasty was deemed most appropriate for repair.  Using a sterile surgical marker, the appropriate transposition arms of the W-plasty were drawn incorporating the defect and placing the expected incisions within the relaxed skin tension lines where possible.    The area thus outlined was incised deep to adipose tissue with a #15 scalpel blade.  The skin margins were undermined to an appropriate distance in all directions utilizing iris scissors.  The opposing transposition arms were then transposed into place in opposite direction and anchored with interrupted buried subcutaneous sutures.
Pain Refusal Text: I offered to prescribe pain medication but the patient refused to take this medication.
Complex Repair And Flap Additional Text (Will Appearing After The Standard Complex Repair Text): The complex repair was not sufficient to completely close the primary defect. The remaining additional defect was repaired with the flap mentioned below.
Length To Time In Minutes Device Was In Place: 60
Alar Island Pedicle Flap Text: The defect edges were debeveled with a #15 scalpel blade.  Given the location of the defect, shape of the defect and the proximity to the alar rim an island pedicle advancement flap was deemed most appropriate.  Using a sterile surgical marker, an appropriate advancement flap was drawn incorporating the defect, outlining the appropriate donor tissue and placing the expected incisions within the nasal ala running parallel to the alar rim. The area thus outlined was incised with a #15 scalpel blade.  The skin margins were undermined minimally to an appropriate distance in all directions around the primary defect and laterally outward around the island pedicle utilizing iris scissors.  There was minimal undermining beneath the pedicle flap.
Inflammation Suggestive Of Cancer Camouflage Histology Text: There was a dense lymphocytic infiltrate which prevented adequate histologic evaluation of adjacent structures.
Deep Sutures: 3-0 Maxon
Graft Donor Site Bandage (Optional-Leave Blank If You Don't Want In Note): Aquaplast was fitted to the graft site and sewn into place. A pressure bandage were applied to the donor site and over the aquaplast bolster.
Consent (Temporal Branch)/Introductory Paragraph: The rationale for Mohs was explained to the patient and consent was obtained. The risks, benefits and alternatives to therapy were discussed in detail. Specifically, the risks of damage to the temporal branch of the facial nerve, infection, scarring, bleeding, prolonged wound healing, incomplete removal, allergy to anesthesia, and recurrence were addressed. Prior to the procedure, the treatment site was clearly identified and confirmed by the patient. All components of Universal Protocol/PAUSE Rule completed.
Hemostasis: Electrocautery
Double Island Pedicle Flap Text: The defect edges were debeveled with a #15 scalpel blade.  Given the location of the defect, shape of the defect and the proximity to free margins a double island pedicle advancement flap was deemed most appropriate.  Using a sterile surgical marker, an appropriate advancement flap was drawn incorporating the defect, outlining the appropriate donor tissue and placing the expected incisions within the relaxed skin tension lines where possible.    The area thus outlined was incised deep to adipose tissue with a #15 scalpel blade.  The skin margins were undermined to an appropriate distance in all directions around the primary defect and laterally outward around the island pedicle utilizing iris scissors.  There was minimal undermining beneath the pedicle flap.
Suturegard Intro: Intraoperative tissue expansion was performed, utilizing the SUTUREGARD device, in order to reduce wound tension.
Body Location Override (Optional - Billing Will Still Be Based On Selected Body Map Location If Applicable): right pretibial
Mohs Histo Method Verbiage: Each section was then chromacoded and processed in the Mohs lab using the Mohs protocol and submitted for frozen section.
Adjacent Tissue Transfer Text: The defect edges were debeveled with a #15 scalpel blade.  Given the location of the defect and the proximity to free margins an adjacent tissue transfer was deemed most appropriate.  Using a sterile surgical marker, an appropriate flap was drawn incorporating the defect and placing the expected incisions within the relaxed skin tension lines where possible.    The area thus outlined was incised deep to adipose tissue with a #15 scalpel blade.  The skin margins were undermined to an appropriate distance in all directions utilizing iris scissors.
Staged Advancement Flap Text: The defect edges were debeveled with a #15 scalpel blade.  Given the location of the defect, shape of the defect and the proximity to free margins a staged advancement flap was deemed most appropriate.  Using a sterile surgical marker, an appropriate advancement flap was drawn incorporating the defect and placing the expected incisions within the relaxed skin tension lines where possible. The area thus outlined was incised deep to adipose tissue with a #15 scalpel blade.  The skin margins were undermined to an appropriate distance in all directions utilizing iris scissors.
Mustarde Flap Text: The defect edges were debeveled with a #15 scalpel blade.  Given the size, depth and location of the defect and the proximity to free margins a Mustarde flap was deemed most appropriate.  Using a sterile surgical marker, an appropriate flap was drawn incorporating the defect. The area thus outlined was incised with a #15 scalpel blade.  The skin margins were undermined to an appropriate distance in all directions utilizing iris scissors.
Abbe Flap (Upper To Lower Lip) Text: The defect of the lower lip was assessed and measured.  Given the location and size of the defect, an Abbe flap was deemed most appropriate.  Using a sterile surgical marker, an appropriate Abbe flap was measured and drawn on the upper lip. Local anesthesia was then infiltrated.  A scalpel was then used to incise the upper lip through and through the skin, vermilion, muscle and mucosa, leaving the flap pedicled on the opposite side.  The flap was then rotated and transferred to the lower lip defect.  The flap was then sutured into place with a three layer technique, closing the orbicularis oris muscle layer with subcutaneous buried sutures, followed by a mucosal layer and an epidermal layer.
Abbe Flap (Lower To Upper Lip) Text: The defect of the upper lip was assessed and measured.  Given the location and size of the defect, an Abbe flap was deemed most appropriate.  Using a sterile surgical marker, an appropriate Abbe flap was measured and drawn on the lower lip. Local anesthesia was then infiltrated. A scalpel was then used to incise the upper lip through and through the skin, vermilion, muscle and mucosa, leaving the flap pedicled on the opposite side.  The flap was then rotated and transferred to the lower lip defect.  The flap was then sutured into place with a three layer technique, closing the orbicularis oris muscle layer with subcutaneous buried sutures, followed by a mucosal layer and an epidermal layer.
Estlander Flap (Upper To Lower Lip) Text: The defect of the lower lip was assessed and measured.  Given the location and size of the defect, an Estlander flap was deemed most appropriate.  Using a sterile surgical marker, an appropriate Estlander flap was measured and drawn on the upper lip. Local anesthesia was then infiltrated. A scalpel was then used to incise the lateral aspect of the flap, through skin, muscle and mucosa, leaving the flap pedicled medially.  The flap was then rotated and positioned to fill the lower lip defect.  The flap was then sutured into place with a three layer technique, closing the orbicularis oris muscle layer with subcutaneous buried sutures, followed by a mucosal layer and an epidermal layer.

## 2022-08-09 ENCOUNTER — APPOINTMENT (RX ONLY)
Dept: URBAN - METROPOLITAN AREA CLINIC 36 | Facility: CLINIC | Age: 67
Setting detail: DERMATOLOGY
End: 2022-08-09

## 2022-08-09 DIAGNOSIS — Z48.02 ENCOUNTER FOR REMOVAL OF SUTURES: ICD-10-CM

## 2022-08-09 PROCEDURE — ? SUTURE REMOVAL (GLOBAL PERIOD)

## 2022-08-09 ASSESSMENT — LOCATION DETAILED DESCRIPTION DERM: LOCATION DETAILED: RIGHT PROXIMAL PRETIBIAL REGION

## 2022-08-09 ASSESSMENT — LOCATION SIMPLE DESCRIPTION DERM: LOCATION SIMPLE: RIGHT PRETIBIAL REGION

## 2022-08-09 ASSESSMENT — LOCATION ZONE DERM: LOCATION ZONE: LEG

## 2022-08-09 NOTE — PROCEDURE: SUTURE REMOVAL (GLOBAL PERIOD)
Detail Level: Detailed
Add 50168 Cpt? (Important Note: In 2017 The Use Of 79556 Is Being Tracked By Cms To Determine Future Global Period Reimbursement For Global Periods): no

## 2022-08-22 ENCOUNTER — TELEPHONE (OUTPATIENT)
Dept: HEALTH INFORMATION MANAGEMENT | Facility: OTHER | Age: 67
End: 2022-08-22

## 2022-11-17 ENCOUNTER — OFFICE VISIT (OUTPATIENT)
Dept: MEDICAL GROUP | Facility: MEDICAL CENTER | Age: 67
End: 2022-11-17
Payer: MEDICARE

## 2022-11-17 VITALS
TEMPERATURE: 97.4 F | HEART RATE: 75 BPM | OXYGEN SATURATION: 95 % | SYSTOLIC BLOOD PRESSURE: 112 MMHG | DIASTOLIC BLOOD PRESSURE: 66 MMHG | RESPIRATION RATE: 14 BRPM | WEIGHT: 186.8 LBS | HEIGHT: 68 IN | BODY MASS INDEX: 28.31 KG/M2

## 2022-11-17 DIAGNOSIS — Z86.007 HISTORY OF SQUAMOUS CELL CARCINOMA IN SITU OF SKIN: ICD-10-CM

## 2022-11-17 DIAGNOSIS — I10 ESSENTIAL HYPERTENSION: ICD-10-CM

## 2022-11-17 DIAGNOSIS — Z23 NEED FOR DIPHTHERIA-TETANUS-PERTUSSIS (TDAP) VACCINE: ICD-10-CM

## 2022-11-17 DIAGNOSIS — T45.1X5A CHEMOTHERAPY-INDUCED PERIPHERAL NEUROPATHY (HCC): ICD-10-CM

## 2022-11-17 DIAGNOSIS — I44.7 LBBB (LEFT BUNDLE BRANCH BLOCK): ICD-10-CM

## 2022-11-17 DIAGNOSIS — G47.33 OSA ON CPAP: ICD-10-CM

## 2022-11-17 DIAGNOSIS — Z85.72 HISTORY OF B-CELL LYMPHOMA: ICD-10-CM

## 2022-11-17 DIAGNOSIS — G62.0 CHEMOTHERAPY-INDUCED PERIPHERAL NEUROPATHY (HCC): ICD-10-CM

## 2022-11-17 DIAGNOSIS — G62.9 NEUROPATHY: ICD-10-CM

## 2022-11-17 DIAGNOSIS — Z86.79 HISTORY OF SUPRAVENTRICULAR TACHYCARDIA: ICD-10-CM

## 2022-11-17 PROCEDURE — 99214 OFFICE O/P EST MOD 30 MIN: CPT | Mod: 25 | Performed by: FAMILY MEDICINE

## 2022-11-17 PROCEDURE — 90471 IMMUNIZATION ADMIN: CPT | Performed by: FAMILY MEDICINE

## 2022-11-17 PROCEDURE — 90715 TDAP VACCINE 7 YRS/> IM: CPT | Performed by: FAMILY MEDICINE

## 2022-11-17 RX ORDER — CHLORAL HYDRATE 500 MG
1 CAPSULE ORAL DAILY
COMMUNITY
Start: 2022-10-19

## 2022-11-17 RX ORDER — GABAPENTIN 100 MG/1
CAPSULE ORAL
Qty: 270 CAPSULE | Refills: 3 | Status: SHIPPED | OUTPATIENT
Start: 2022-11-17 | End: 2023-11-14 | Stop reason: SDUPTHER

## 2022-11-17 ASSESSMENT — FIBROSIS 4 INDEX: FIB4 SCORE: 1.21

## 2022-11-17 NOTE — PROGRESS NOTES
Chief Complaint   Patient presents with    Hypertension Follow-up       Subjective:     HPI:   Karla Villeda presents today with the followin. Essential hypertension  HTN - Chronic condition stable. Currently taking all meds as directed.   She is not taking baby aspirin daily.   She is monitoring BP at home. Checks every 2 weeks.  Has been 110-120/70s  Denies symptoms low BP: light-headed, tunnel-vision, unusual fatigue.   Denies symptoms high BP:pounding headache, visual changes, palpitations, flushed face.   Denies medicine side effects: unusual fatigue, slow heartbeat, foot/leg swelling, cough.  Lab orders discussed and placed.    2. History of B-cell lymphoma  This was in .  She has done very well with no recurrence.  She had intense chemotherapy in  with Adriamycin, Rituxan, vincristine and Cytoxan.  She had side effects from this but it seems to have taking care of the cancer.    3. Chemotherapy-induced peripheral neuropathy (HCC)/Neuropathy  She certainly has continued neuropathy.  She also has some restless legs, then increased problem with foot drop and neuropathy with lumbar disc protrusion, better after surgery.  The gabapentin is helping significantly with her symptoms.    4. LBBB (left bundle branch block)  History of SVT.  Has left bundle branch block.      5. LAKIA on CPAP  Continues to use regularly.  O2 sat normal today.  Denies daytime sleepiness.  Needs to see pulmonology again 2023.    6. Need for diphtheria-tetanus-pertussis (Tdap) vaccine  Administered today.  She will have a new grandchild soon.        Patient Active Problem List    Diagnosis Date Noted    History of squamous cell carcinoma in situ of skin 2022    History of B-cell lymphoma 2021    Chemotherapy-induced peripheral neuropathy (HCC) 2021    History of coccidioidomycosis 2021    Essential hypertension 2020    LBBB (left bundle branch block) 2020    LAKIA on CPAP  "01/07/2020       Current medicines (including changes today)  Current Outpatient Medications   Medication Sig Dispense Refill    Omega-3 1000 MG Cap Take 1 Capsule by mouth every day.      gabapentin (NEURONTIN) 100 MG Cap TAKE 1 CAPSULE BY MOUTH THREE TIMES A  Capsule 3    losartan (COZAAR) 25 MG Tab Take 1 Tablet by mouth every day. 90 Tablet 3    metoprolol SR (TOPROL XL) 25 MG TABLET SR 24 HR Take 1 Tablet by mouth every day. 90 Tablet 3    Multiple Vitamins-Minerals (MULTIVITAMIN ADULT PO) Take  by mouth.      Calcium Carb-Cholecalciferol (CALCIUM + D3 PO) Take  by mouth.       No current facility-administered medications for this visit.       Allergies   Allergen Reactions    Ampicillin      rash    Biaxin [Clarithromycin]      rash    Sulfa Drugs      rash       ROS: As per HPI       Objective:     /66 (BP Location: Right arm, Patient Position: Sitting, BP Cuff Size: Small adult)   Pulse 75   Temp 36.3 °C (97.4 °F) (Temporal)   Resp 14   Ht 1.727 m (5' 8\")   Wt 84.7 kg (186 lb 12.8 oz)   SpO2 95%  Body mass index is 28.4 kg/m².    Physical Exam:  Constitutional: Well-developed and well-nourished. Not diaphoretic. No distress. Lucid and fluent.  Patient, physician and staff all wearing masks.  Skin: Skin is warm and dry. No rash noted.  Head: Atraumatic without lesions.  Eyes: Conjunctivae and extraocular motions are normal. Pupils are equal, round, and reactive to light. No scleral icterus.   Ears:  External ears unremarkable.   Neck: Supple, trachea midline. No thyromegaly present. No cervical or supraclavicular lymphadenopathy. No JVD or carotid bruits appreciated  Cardiovascular: Regular rate and rhythm.  Normal S1, S2 without murmur appreciated.  Chest: Effort normal. Clear to auscultation throughout. No adventitious sounds.   Abdomen: Soft, non tender, and without distention. Active bowel sounds in all four quadrants. No rebound, guarding, masses or hepatosplenomegaly.  Extremities: No " cyanosis, clubbing, erythema, nor edema.   Neurological: Alert and oriented x 3. No tremor.   Psychiatric:  Behavior, mood, and affect are appropriate.       Assessment and Plan:     67 y.o. female with the following issues:    1. Essential hypertension  Comp Metabolic Panel    Lipid Profile    TSH      2. History of B-cell lymphoma  LDH      3. Chemotherapy-induced peripheral neuropathy (HCC)  gabapentin (NEURONTIN) 100 MG Cap      4. Neuropathy  Comp Metabolic Panel    Lipid Profile    TSH    gabapentin (NEURONTIN) 100 MG Cap      5. LBBB (left bundle branch block)        6. LAKIA on CPAP  CBC WITH DIFFERENTIAL      7. Need for diphtheria-tetanus-pertussis (Tdap) vaccine  Tdap Vaccine =>6YO IM      8. History of supraventricular tachycardia        9. History of squamous cell carcinoma in situ of skin              Followup: Return in about 6 months (around 5/17/2023), or if symptoms worsen or fail to improve.

## 2022-11-21 ENCOUNTER — OFFICE VISIT (OUTPATIENT)
Dept: CARDIOLOGY | Facility: MEDICAL CENTER | Age: 67
End: 2022-11-21
Payer: MEDICARE

## 2022-11-21 VITALS
HEART RATE: 74 BPM | DIASTOLIC BLOOD PRESSURE: 60 MMHG | WEIGHT: 188 LBS | SYSTOLIC BLOOD PRESSURE: 120 MMHG | HEIGHT: 68 IN | BODY MASS INDEX: 28.49 KG/M2 | RESPIRATION RATE: 16 BRPM | OXYGEN SATURATION: 94 %

## 2022-11-21 DIAGNOSIS — G47.33 OSA ON CPAP: ICD-10-CM

## 2022-11-21 DIAGNOSIS — I44.7 LBBB (LEFT BUNDLE BRANCH BLOCK): ICD-10-CM

## 2022-11-21 DIAGNOSIS — I10 ESSENTIAL HYPERTENSION: ICD-10-CM

## 2022-11-21 DIAGNOSIS — T45.1X5A CHEMOTHERAPY-INDUCED PERIPHERAL NEUROPATHY (HCC): ICD-10-CM

## 2022-11-21 DIAGNOSIS — G62.0 CHEMOTHERAPY-INDUCED PERIPHERAL NEUROPATHY (HCC): ICD-10-CM

## 2022-11-21 DIAGNOSIS — E78.2 MIXED HYPERLIPIDEMIA: ICD-10-CM

## 2022-11-21 PROCEDURE — 99214 OFFICE O/P EST MOD 30 MIN: CPT | Performed by: INTERNAL MEDICINE

## 2022-11-21 RX ORDER — METOPROLOL SUCCINATE 25 MG/1
25 TABLET, EXTENDED RELEASE ORAL DAILY
Qty: 90 TABLET | Refills: 3 | Status: SHIPPED | OUTPATIENT
Start: 2022-11-21 | End: 2023-11-14 | Stop reason: SDUPTHER

## 2022-11-21 RX ORDER — LOSARTAN POTASSIUM 25 MG/1
25 TABLET ORAL
Qty: 90 TABLET | Refills: 3 | Status: SHIPPED | OUTPATIENT
Start: 2022-11-21 | End: 2023-08-21

## 2022-11-21 ASSESSMENT — ENCOUNTER SYMPTOMS
CHILLS: 0
WHEEZING: 0
RESPIRATORY NEGATIVE: 1
MUSCULOSKELETAL NEGATIVE: 1
LOSS OF CONSCIOUSNESS: 0
CLAUDICATION: 0
NEUROLOGICAL NEGATIVE: 1
FEVER: 0
PND: 0
WEAKNESS: 0
EYES NEGATIVE: 1
ORTHOPNEA: 0
DIZZINESS: 0
SORE THROAT: 0
HEMOPTYSIS: 0
COUGH: 0
CONSTITUTIONAL NEGATIVE: 1
SHORTNESS OF BREATH: 0
SPUTUM PRODUCTION: 0
PALPITATIONS: 0
STRIDOR: 0
BRUISES/BLEEDS EASILY: 0
CARDIOVASCULAR NEGATIVE: 1
GASTROINTESTINAL NEGATIVE: 1

## 2022-11-21 ASSESSMENT — FIBROSIS 4 INDEX: FIB4 SCORE: 1.21

## 2022-11-21 NOTE — PROGRESS NOTES
Chief Complaint   Patient presents with    Cardiomyopathy (Non-ischemic)    Hypertension       Subjective     Karla Villeda is a 67 y.o. female who presents today as a follow-up from a prior provider for history of left bundle branch block hypertension heart failure from lymphoma under treatment.  Since she was last seen her EF is normal.  She having no chest pain or shortness of breath.  Her LV (block continues to be stable with a QRS of about 145.  She gets only mild shortness of breath with exertion.    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    In remission    Chemotherapy-induced peripheral neuropathy (HCC) 8/12/2021    Chickenpox     Essential hypertension 1/7/2020    [place]gu[place] measles     History of B-cell lymphoma 8/12/2021    History of coccidioidomycosis 8/12/2021    coccidiodies Pneumonia in 1998    History of squamous cell carcinoma in situ of skin 11/17/2022 6/2022      LBBB (left bundle branch block) 1/7/2020    LAKIA on CPAP 1/7/2020     Past Surgical History:   Procedure Laterality Date    LUMBAR LAMINECTOMY DISKECTOMY      LYMPH NODE EXCISION      TONSILLECTOMY      TONSILLECTOMY AND ADENOIDECTOMY      TUBAL COAGULATION LAPAROSCOPIC BILATERAL       Family History   Problem Relation Age of Onset    Hypertension Mother     Lung Disease Father     Cancer Father         lung    Cancer Brother         melanoma    Cancer Brother         liver, testicular    Sleep Apnea Neg Hx     Heart Attack Neg Hx      Social History     Socioeconomic History    Marital status:      Spouse name: Not on file    Number of children: Not on file    Years of education: Not on file    Highest education level: Not on file   Occupational History    Not on file   Tobacco Use    Smoking status: Never    Smokeless tobacco: Never   Vaping Use    Vaping Use: Never used   Substance and Sexual Activity    Alcohol use: Yes     Comment: 1 a week     Drug use:  Never    Sexual activity: Yes     Partners: Male   Other Topics Concern    Not on file   Social History Narrative    Not on file     Social Determinants of Health     Financial Resource Strain: Not on file   Food Insecurity: Not on file   Transportation Needs: Not on file   Physical Activity: Not on file   Stress: Not on file   Social Connections: Not on file   Intimate Partner Violence: Not on file   Housing Stability: Not on file     Allergies   Allergen Reactions    Ampicillin      rash    Biaxin [Clarithromycin]      rash    Sulfa Drugs      rash     Outpatient Encounter Medications as of 11/21/2022   Medication Sig Dispense Refill    metoprolol SR (TOPROL XL) 25 MG TABLET SR 24 HR Take 1 Tablet by mouth every day. 90 Tablet 3    losartan (COZAAR) 25 MG Tab Take 1 Tablet by mouth every day. 90 Tablet 3    Omega-3 1000 MG Cap Take 1 Capsule by mouth every day.      gabapentin (NEURONTIN) 100 MG Cap TAKE 1 CAPSULE BY MOUTH THREE TIMES A  Capsule 3    Multiple Vitamins-Minerals (MULTIVITAMIN ADULT PO) Take  by mouth.      Calcium Carb-Cholecalciferol (CALCIUM + D3 PO) Take  by mouth.      [DISCONTINUED] losartan (COZAAR) 25 MG Tab Take 1 Tablet by mouth every day. 90 Tablet 3    [DISCONTINUED] metoprolol SR (TOPROL XL) 25 MG TABLET SR 24 HR Take 1 Tablet by mouth every day. 90 Tablet 3     No facility-administered encounter medications on file as of 11/21/2022.     Review of Systems   Constitutional: Negative.  Negative for chills, fever and malaise/fatigue.   HENT: Negative.  Negative for sore throat.    Eyes: Negative.    Respiratory: Negative.  Negative for cough, hemoptysis, sputum production, shortness of breath, wheezing and stridor.    Cardiovascular: Negative.  Negative for chest pain, palpitations, orthopnea, claudication, leg swelling and PND.   Gastrointestinal: Negative.    Genitourinary: Negative.    Musculoskeletal: Negative.    Skin: Negative.    Neurological: Negative.  Negative for  "dizziness, loss of consciousness and weakness.   Endo/Heme/Allergies: Negative.  Does not bruise/bleed easily.   All other systems reviewed and are negative.           Objective     /60 (BP Location: Left arm, Patient Position: Sitting, BP Cuff Size: Adult)   Pulse 74   Resp 16   Ht 1.727 m (5' 8\")   Wt 85.3 kg (188 lb)   SpO2 94%   BMI 28.59 kg/m²     Physical Exam  Vitals and nursing note reviewed.   Constitutional:       General: She is not in acute distress.     Appearance: She is well-developed. She is not diaphoretic.   HENT:      Head: Normocephalic and atraumatic.      Right Ear: External ear normal.      Left Ear: External ear normal.      Nose: Nose normal.      Mouth/Throat:      Pharynx: No oropharyngeal exudate.   Eyes:      General: No scleral icterus.        Right eye: No discharge.         Left eye: No discharge.      Conjunctiva/sclera: Conjunctivae normal.      Pupils: Pupils are equal, round, and reactive to light.   Neck:      Vascular: No JVD.   Cardiovascular:      Rate and Rhythm: Normal rate and regular rhythm.      Heart sounds: No murmur heard.    No friction rub. No gallop.   Pulmonary:      Effort: Pulmonary effort is normal. No respiratory distress.      Breath sounds: No stridor. No wheezing or rales.   Chest:      Chest wall: No tenderness.   Abdominal:      General: There is no distension.      Palpations: Abdomen is soft.      Tenderness: There is no guarding.   Musculoskeletal:         General: No tenderness or deformity. Normal range of motion.      Cervical back: Neck supple.   Skin:     General: Skin is warm and dry.      Coloration: Skin is not pale.      Findings: No erythema or rash.   Neurological:      Mental Status: She is alert.      Cranial Nerves: No cranial nerve deficit.      Motor: No abnormal muscle tone.      Coordination: Coordination normal.      Deep Tendon Reflexes: Reflexes are normal and symmetric. Reflexes normal.   Psychiatric:         Behavior: " Behavior normal.         Thought Content: Thought content normal.         Judgment: Judgment normal.          Echocardiogram: Dated 1/21/2021 personally viewed inter myself showing normal LV systolic function no valvular heart disease.    ECG: Dated 7/10/2020 personally viewed inter myself showing sinus rhythm with left branch block  ms.    Lab Results   Component Value Date/Time    CHOLSTRLTOT 204 (H) 06/17/2022 07:26 AM     (H) 06/17/2022 07:26 AM    HDL 66 06/17/2022 07:26 AM    TRIGLYCERIDE 87 06/17/2022 07:26 AM       Lab Results   Component Value Date/Time    SODIUM 140 06/17/2022 07:26 AM    POTASSIUM 4.2 06/17/2022 07:26 AM    CHLORIDE 105 06/17/2022 07:26 AM    CO2 25 06/17/2022 07:26 AM    GLUCOSE 87 06/17/2022 07:26 AM    BUN 17 06/17/2022 07:26 AM    CREATININE 0.78 06/17/2022 07:26 AM     Lab Results   Component Value Date/Time    ALKPHOSPHAT 76 06/17/2022 07:26 AM    ASTSGOT 16 06/17/2022 07:26 AM    ALTSGPT 19 06/17/2022 07:26 AM    TBILIRUBIN 0.8 06/17/2022 07:26 AM          Assessment & Plan     1. Essential hypertension  metoprolol SR (TOPROL XL) 25 MG TABLET SR 24 HR    losartan (COZAAR) 25 MG Tab    CT-CARDIAC SCORING      2. LBBB (left bundle branch block)  metoprolol SR (TOPROL XL) 25 MG TABLET SR 24 HR    CT-CARDIAC SCORING      3. LAKIA on CPAP        4. Chemotherapy-induced peripheral neuropathy (HCC)        5. Mixed hyperlipidemia  CT-CARDIAC SCORING          Medical Decision Making: Today's Assessment/Status/Plan:        67-year-old female with high blood pressure left block loop hyperlipidemia and a history of heart failure.  At this point given no change in her clinical status we do not need to repeat an echocardiogram.  We have given her some medications but no changes.  For her high cholesterol I will get a calcium score.  We will call her with the results.  I will see her back in 6 months.

## 2022-11-30 ENCOUNTER — HOSPITAL ENCOUNTER (OUTPATIENT)
Dept: RADIOLOGY | Facility: MEDICAL CENTER | Age: 67
End: 2022-11-30
Attending: INTERNAL MEDICINE
Payer: COMMERCIAL

## 2022-11-30 DIAGNOSIS — I10 ESSENTIAL HYPERTENSION: ICD-10-CM

## 2022-11-30 DIAGNOSIS — E78.2 MIXED HYPERLIPIDEMIA: ICD-10-CM

## 2022-11-30 DIAGNOSIS — I44.7 LBBB (LEFT BUNDLE BRANCH BLOCK): ICD-10-CM

## 2022-11-30 PROCEDURE — 4410556 CT-CARDIAC SCORING (SELF PAY ONLY)

## 2022-12-20 ENCOUNTER — APPOINTMENT (OUTPATIENT)
Dept: RADIOLOGY | Facility: MEDICAL CENTER | Age: 67
End: 2022-12-20
Attending: FAMILY MEDICINE
Payer: MEDICARE

## 2022-12-20 DIAGNOSIS — Z12.31 VISIT FOR SCREENING MAMMOGRAM: ICD-10-CM

## 2022-12-20 PROCEDURE — 77063 BREAST TOMOSYNTHESIS BI: CPT

## 2022-12-30 ENCOUNTER — HOSPITAL ENCOUNTER (OUTPATIENT)
Facility: MEDICAL CENTER | Age: 67
End: 2022-12-30
Payer: MEDICARE

## 2022-12-30 PROCEDURE — 82274 ASSAY TEST FOR BLOOD FECAL: CPT

## 2023-01-05 LAB — IMM ASSAY OCC BLD FITOB: NEGATIVE

## 2023-02-07 ENCOUNTER — PATIENT MESSAGE (OUTPATIENT)
Dept: CARDIOLOGY | Facility: MEDICAL CENTER | Age: 68
End: 2023-02-07
Payer: MEDICARE

## 2023-02-13 ENCOUNTER — TELEPHONE (OUTPATIENT)
Dept: CARDIOLOGY | Facility: MEDICAL CENTER | Age: 68
End: 2023-02-13
Payer: MEDICARE

## 2023-02-13 NOTE — TELEPHONE ENCOUNTER
MARY GRACE    Caller: Karla Villeda     Topic/issue: Patient is calling in regards to CT cardio exam done back in November and is inquiring about the results.    Callback Number: [phone]    Thank you,  -Sandra MAGAÑA

## 2023-02-24 NOTE — TELEPHONE ENCOUNTER
Calcium score came out to moderate risk but nothing of significant concern though I would have her increase her atorvastatin from 10-20.

## 2023-02-24 NOTE — TELEPHONE ENCOUNTER
Phone Number Called: [phone]    Call outcome: Spoke to patient regarding message below.    Message: Called to return patient's call regarding CT cardiac scoring test. Patient concerned about results and asking if she needs to begin a statin. Patient reports taking Atorvastatin 10 mg daily in the past.      RN to reach out to RO for further recommendations.     To: RO    Based on CT cardiac scoring test result of 108, would you like patient to take statin? Patient reports she was taking Atorvastatin 10 mg daily. Please advise. Thank you

## 2023-02-28 ENCOUNTER — PATIENT MESSAGE (OUTPATIENT)
Dept: CARDIOLOGY | Facility: MEDICAL CENTER | Age: 68
End: 2023-02-28
Payer: MEDICARE

## 2023-02-28 DIAGNOSIS — E78.2 MIXED HYPERLIPIDEMIA: ICD-10-CM

## 2023-02-28 RX ORDER — ATORVASTATIN CALCIUM 20 MG/1
20 TABLET, FILM COATED ORAL NIGHTLY
Qty: 90 TABLET | Refills: 3 | Status: SHIPPED | OUTPATIENT
Start: 2023-02-28 | End: 2023-12-20 | Stop reason: SDUPTHER

## 2023-02-28 NOTE — TELEPHONE ENCOUNTER
Robert Kidd M.D.  You 4 days ago       Calcium score came out to moderate risk but nothing of significant concern though I would have her increase her atorvastatin from 10-20.

## 2023-05-08 ENCOUNTER — HOSPITAL ENCOUNTER (OUTPATIENT)
Dept: LAB | Facility: MEDICAL CENTER | Age: 68
End: 2023-05-08
Attending: FAMILY MEDICINE
Payer: MEDICARE

## 2023-05-08 DIAGNOSIS — G62.9 NEUROPATHY: ICD-10-CM

## 2023-05-08 DIAGNOSIS — G47.33 OSA ON CPAP: ICD-10-CM

## 2023-05-08 DIAGNOSIS — I10 ESSENTIAL HYPERTENSION: ICD-10-CM

## 2023-05-08 DIAGNOSIS — Z85.72 HISTORY OF B-CELL LYMPHOMA: ICD-10-CM

## 2023-05-08 LAB
BASOPHILS # BLD AUTO: 0.9 % (ref 0–1.8)
BASOPHILS # BLD: 0.04 K/UL (ref 0–0.12)
EOSINOPHIL # BLD AUTO: 0.08 K/UL (ref 0–0.51)
EOSINOPHIL NFR BLD: 1.8 % (ref 0–6.9)
ERYTHROCYTE [DISTWIDTH] IN BLOOD BY AUTOMATED COUNT: 46 FL (ref 35.9–50)
HCT VFR BLD AUTO: 44 % (ref 37–47)
HGB BLD-MCNC: 14.4 G/DL (ref 12–16)
IMM GRANULOCYTES # BLD AUTO: 0.01 K/UL (ref 0–0.11)
IMM GRANULOCYTES NFR BLD AUTO: 0.2 % (ref 0–0.9)
LYMPHOCYTES # BLD AUTO: 2.03 K/UL (ref 1–4.8)
LYMPHOCYTES NFR BLD: 44.6 % (ref 22–41)
MCH RBC QN AUTO: 30.7 PG (ref 27–33)
MCHC RBC AUTO-ENTMCNC: 32.7 G/DL (ref 33.6–35)
MCV RBC AUTO: 93.8 FL (ref 81.4–97.8)
MONOCYTES # BLD AUTO: 0.44 K/UL (ref 0–0.85)
MONOCYTES NFR BLD AUTO: 9.7 % (ref 0–13.4)
NEUTROPHILS # BLD AUTO: 1.95 K/UL (ref 2–7.15)
NEUTROPHILS NFR BLD: 42.8 % (ref 44–72)
NRBC # BLD AUTO: 0 K/UL
NRBC BLD-RTO: 0 /100 WBC
PLATELET # BLD AUTO: 193 K/UL (ref 164–446)
PMV BLD AUTO: 10.5 FL (ref 9–12.9)
RBC # BLD AUTO: 4.69 M/UL (ref 4.2–5.4)
WBC # BLD AUTO: 4.6 K/UL (ref 4.8–10.8)

## 2023-05-08 PROCEDURE — 85025 COMPLETE CBC W/AUTO DIFF WBC: CPT

## 2023-05-08 PROCEDURE — 84443 ASSAY THYROID STIM HORMONE: CPT

## 2023-05-08 PROCEDURE — 83615 LACTATE (LD) (LDH) ENZYME: CPT

## 2023-05-08 PROCEDURE — 80061 LIPID PANEL: CPT

## 2023-05-08 PROCEDURE — 36415 COLL VENOUS BLD VENIPUNCTURE: CPT

## 2023-05-08 PROCEDURE — 80053 COMPREHEN METABOLIC PANEL: CPT

## 2023-05-09 LAB
ALBUMIN SERPL BCP-MCNC: 4.1 G/DL (ref 3.2–4.9)
ALBUMIN/GLOB SERPL: 1.4 G/DL
ALP SERPL-CCNC: 83 U/L (ref 30–99)
ALT SERPL-CCNC: 28 U/L (ref 2–50)
ANION GAP SERPL CALC-SCNC: 11 MMOL/L (ref 7–16)
AST SERPL-CCNC: 25 U/L (ref 12–45)
BILIRUB SERPL-MCNC: 0.7 MG/DL (ref 0.1–1.5)
BUN SERPL-MCNC: 16 MG/DL (ref 8–22)
CALCIUM ALBUM COR SERPL-MCNC: 9.3 MG/DL (ref 8.5–10.5)
CALCIUM SERPL-MCNC: 9.4 MG/DL (ref 8.5–10.5)
CHLORIDE SERPL-SCNC: 105 MMOL/L (ref 96–112)
CHOLEST SERPL-MCNC: 135 MG/DL (ref 100–199)
CO2 SERPL-SCNC: 24 MMOL/L (ref 20–33)
CREAT SERPL-MCNC: 0.83 MG/DL (ref 0.5–1.4)
FASTING STATUS PATIENT QL REPORTED: NORMAL
GFR SERPLBLD CREATININE-BSD FMLA CKD-EPI: 77 ML/MIN/1.73 M 2
GLOBULIN SER CALC-MCNC: 2.9 G/DL (ref 1.9–3.5)
GLUCOSE SERPL-MCNC: 85 MG/DL (ref 65–99)
HDLC SERPL-MCNC: 71 MG/DL
LDH SERPL L TO P-CCNC: 215 U/L (ref 107–266)
LDLC SERPL CALC-MCNC: 47 MG/DL
POTASSIUM SERPL-SCNC: 4.6 MMOL/L (ref 3.6–5.5)
PROT SERPL-MCNC: 7 G/DL (ref 6–8.2)
SODIUM SERPL-SCNC: 140 MMOL/L (ref 135–145)
TRIGL SERPL-MCNC: 83 MG/DL (ref 0–149)
TSH SERPL DL<=0.005 MIU/L-ACNC: 3.45 UIU/ML (ref 0.38–5.33)

## 2023-05-10 ENCOUNTER — TELEPHONE (OUTPATIENT)
Dept: HEALTH INFORMATION MANAGEMENT | Facility: OTHER | Age: 68
End: 2023-05-10

## 2023-05-17 ENCOUNTER — OFFICE VISIT (OUTPATIENT)
Dept: MEDICAL GROUP | Facility: MEDICAL CENTER | Age: 68
End: 2023-05-17
Payer: MEDICARE

## 2023-05-17 VITALS
HEART RATE: 74 BPM | RESPIRATION RATE: 14 BRPM | TEMPERATURE: 97.8 F | WEIGHT: 185.63 LBS | DIASTOLIC BLOOD PRESSURE: 64 MMHG | BODY MASS INDEX: 28.13 KG/M2 | HEIGHT: 68 IN | SYSTOLIC BLOOD PRESSURE: 112 MMHG | OXYGEN SATURATION: 95 %

## 2023-05-17 DIAGNOSIS — T45.1X5A CHEMOTHERAPY-INDUCED PERIPHERAL NEUROPATHY (HCC): ICD-10-CM

## 2023-05-17 DIAGNOSIS — Z86.007 HISTORY OF SQUAMOUS CELL CARCINOMA IN SITU OF SKIN: ICD-10-CM

## 2023-05-17 DIAGNOSIS — I25.10 ATHEROSCLEROSIS OF NATIVE CORONARY ARTERY OF NATIVE HEART WITHOUT ANGINA PECTORIS: ICD-10-CM

## 2023-05-17 DIAGNOSIS — N90.89 LABIA IRRITATION: ICD-10-CM

## 2023-05-17 DIAGNOSIS — N81.4 UTERINE PROLAPSE: ICD-10-CM

## 2023-05-17 DIAGNOSIS — Z91.89 HISTORY OF SEVERE SUN EXPOSURE: ICD-10-CM

## 2023-05-17 DIAGNOSIS — I77.89 AORTIC ROOT ENLARGEMENT (HCC): ICD-10-CM

## 2023-05-17 DIAGNOSIS — I44.7 LBBB (LEFT BUNDLE BRANCH BLOCK): ICD-10-CM

## 2023-05-17 DIAGNOSIS — G47.33 OSA ON CPAP: ICD-10-CM

## 2023-05-17 DIAGNOSIS — G62.0 CHEMOTHERAPY-INDUCED PERIPHERAL NEUROPATHY (HCC): ICD-10-CM

## 2023-05-17 DIAGNOSIS — I10 ESSENTIAL HYPERTENSION: ICD-10-CM

## 2023-05-17 DIAGNOSIS — I35.1 MILD AORTIC INSUFFICIENCY: ICD-10-CM

## 2023-05-17 DIAGNOSIS — Z12.31 ENCOUNTER FOR SCREENING MAMMOGRAM FOR BREAST CANCER: ICD-10-CM

## 2023-05-17 PROCEDURE — 3078F DIAST BP <80 MM HG: CPT | Performed by: FAMILY MEDICINE

## 2023-05-17 PROCEDURE — 99214 OFFICE O/P EST MOD 30 MIN: CPT | Performed by: FAMILY MEDICINE

## 2023-05-17 PROCEDURE — 3074F SYST BP LT 130 MM HG: CPT | Performed by: FAMILY MEDICINE

## 2023-05-17 RX ORDER — CHLORHEXIDINE GLUCONATE ORAL RINSE 1.2 MG/ML
SOLUTION DENTAL
COMMUNITY
Start: 2023-05-09 | End: 2023-11-14

## 2023-05-17 RX ORDER — TIZANIDINE 2 MG/1
TABLET ORAL
COMMUNITY
Start: 2023-05-09 | End: 2023-05-31

## 2023-05-17 RX ORDER — CLINDAMYCIN HYDROCHLORIDE 300 MG/1
CAPSULE ORAL
COMMUNITY
Start: 2023-05-09 | End: 2023-05-31

## 2023-05-17 RX ORDER — HYDROCODONE BITARTRATE AND ACETAMINOPHEN 5; 325 MG/1; MG/1
TABLET ORAL
COMMUNITY
Start: 2023-05-10 | End: 2023-05-31

## 2023-05-17 RX ORDER — DEXAMETHASONE 4 MG/1
TABLET ORAL
COMMUNITY
Start: 2023-05-09 | End: 2023-05-31

## 2023-05-17 ASSESSMENT — FIBROSIS 4 INDEX: FIB4 SCORE: 1.64

## 2023-05-17 ASSESSMENT — PATIENT HEALTH QUESTIONNAIRE - PHQ9: CLINICAL INTERPRETATION OF PHQ2 SCORE: 0

## 2023-05-17 NOTE — PROGRESS NOTES
Chief Complaint   Patient presents with    Hypertension Follow-up     6m fv    Orders Needed     Mammogram. X-ray for shoulder pain.    Referral Needed     Dermatology     Vaginal Pain     Red sore area near the labia that pt noticed Saturday. Pt has concerns of possible prolapse.        Subjective:     HPI:   Karla Villeda presents today with the followin. Essential hypertension  HTN - Chronic condition stable. Currently taking all meds as directed.   She is not taking baby aspirin daily.   She is monitoring BP at home.  It has been similar to today 110s to 120s systolic over 60s to 70s.  Denies symptoms low BP: light-headed, tunnel-vision, unusual fatigue.   Denies symptoms high BP:pounding headache, visual changes, palpitations, flushed face.   Denies medicine side effects: unusual fatigue, slow heartbeat, foot/leg swelling, cough.    2. LBBB (left bundle branch block)  Patient has diagnosis of left bundle branch block.  She is on metoprolol long-term.  Denies any palpitations.  She has a follow-up with cardiology the end of this month.    3. Atherosclerosis of native coronary artery of native heart without angina pectoris  Patient does have atherosclerosis.  She continues to take atorvastatin which she tolerates well.  Her most recent lipid levels a few days ago are excellent on this medication with an LDL of 47.    4. Mild aortic insufficiency  Echocardiogram from 2021 shows mild aortic insufficiency.    5. Aortic root enlargement (HCC)  I am concerned that the echocardiogram showed aortic root enlargement.  This is borderline.  She should have a follow-up echocardiogram but I am not sure when.  She will ask the cardiologist.    6. LAKIA on CPAP  She continues to use her CPAP every night.  Typically at least 5 to 6 hours.  She has not seen pulmonology in a while partially due to pandemic concerns.  Referral is discussed and placed.  - Referral to Pulmonary and Sleep Medicine    7.  Chemotherapy-induced peripheral neuropathy (HCC)  Patient has chemotherapy-induced peripheral neuropathy.  The gabapentin reduces her symptoms significantly but does not resolve them.    8. Labia irritation/Uterine prolapse  She recently noted an area of labial irritation.  Denies any bleeding or open skin.  However when she looked down there with the assistance of a mirror she was surprised to see uterine prolapse.  The labial irritation has persisted.  Gynecology referral is discussed and placed.  - Referral to Gynecology    9. History of squamous cell carcinoma in situ of skin/history of severe sun exposure  Patient has history of squamous cell cancer.  Denies any new lesions but is due for follow-up.  She also has history of severe sun exposure when she was young.  Dermatology referral discussed and placed.  - Referral to Dermatology    10. Encounter for screening mammogram for breast cancer  Mammogram order discussed and placed.  She likes to book this far ahead of time.  - MA-SCREENING MAMMO BILAT W/TOMOSYNTHESIS W/CAD; Future          Patient Active Problem List    Diagnosis Date Noted    Mild aortic insufficiency 05/17/2023    Aortic root enlargement (HCC) 05/17/2023    Atherosclerosis of native coronary artery of native heart without angina pectoris 05/17/2023    Uterine prolapse 05/17/2023    Labia irritation 05/17/2023    History of severe sun exposure 05/17/2023    History of squamous cell carcinoma in situ of skin 11/17/2022    History of B-cell lymphoma 08/12/2021    Chemotherapy-induced peripheral neuropathy (HCC) 08/12/2021    History of coccidioidomycosis 08/12/2021    Essential hypertension 01/07/2020    LBBB (left bundle branch block) 01/07/2020    LAKIA on CPAP 01/07/2020       Current medicines (including changes today)  Current Outpatient Medications   Medication Sig Dispense Refill    tizanidine (ZANAFLEX) 2 MG tablet PLEASE SEE ATTACHED FOR DETAILED DIRECTIONS      HYDROcodone-acetaminophen  "(NORCO) 5-325 MG Tab per tablet TAKE 1 TABLET BY MOUTH EVERY 4 TO 6 HOURS AS NEEDED      dexamethasone (DECADRON) 4 MG Tab PLEASE SEE ATTACHED FOR DETAILED DIRECTIONS      clindamycin (CLEOCIN) 300 MG Cap TAKE 1 CAPSULE 3 TIMES A DAY UNTIL COMPLETED      chlorhexidine (PERIDEX) 0.12 % Solution RINSE AND SPIT WITH 1 CAPFUL AFTER BRUSHING AND FLOSSING 3 TIMES A DAY      atorvastatin (LIPITOR) 20 MG Tab Take 1 Tablet by mouth every evening. 90 Tablet 3    metoprolol SR (TOPROL XL) 25 MG TABLET SR 24 HR Take 1 Tablet by mouth every day. 90 Tablet 3    losartan (COZAAR) 25 MG Tab Take 1 Tablet by mouth every day. 90 Tablet 3    Omega-3 1000 MG Cap Take 1 Capsule by mouth every day.      gabapentin (NEURONTIN) 100 MG Cap TAKE 1 CAPSULE BY MOUTH THREE TIMES A  Capsule 3    Multiple Vitamins-Minerals (MULTIVITAMIN ADULT PO) Take  by mouth.      Calcium Carb-Cholecalciferol (CALCIUM + D3 PO) Take  by mouth.       No current facility-administered medications for this visit.       Allergies   Allergen Reactions    Ampicillin      rash    Biaxin [Clarithromycin]      rash    Sulfa Drugs      rash       ROS: As per HPI       Objective:     /64 (BP Location: Right arm, Patient Position: Sitting, BP Cuff Size: Small adult)   Pulse 74   Temp 36.6 °C (97.8 °F) (Temporal)   Resp 14   Ht 1.727 m (5' 8\")   Wt 84.2 kg (185 lb 10 oz)   SpO2 95%  Body mass index is 28.22 kg/m².    Physical Exam:  Constitutional: Well-developed and well-nourished. Not diaphoretic. No distress. Lucid and fluent.  Skin: Skin is warm and dry. No rash noted.  Head: Atraumatic without lesions.  Eyes: Conjunctivae and extraocular motions are normal. Pupils are equal, round, and reactive to light. No scleral icterus.   Ears:  External ears unremarkable.   Mouth/Throat: Tongue normal. Oropharynx is clear and moist. Posterior pharynx without erythema or exudates.  Neck: Supple, trachea midline. No thyromegaly present. No cervical or " supraclavicular lymphadenopathy. No JVD or carotid bruits appreciated  Cardiovascular: Regular rate and rhythm.  Normal S1, S2 without murmur appreciated.  Chest: Effort normal. Clear to auscultation throughout. No adventitious sounds.   Abdomen: Soft, non tender, and without distention. Active bowel sounds in all four quadrants. No rebound, guarding, masses or hepatosplenomegaly.  Extremities: No cyanosis, clubbing, erythema, nor edema.   Neurological: Alert and oriented x 3.  No tremor appreciated.  Psychiatric:  Behavior, mood, and affect are appropriate.    Lab results from 5/8/23 reviewed and discussed, superb lipids on atorvastatin.  Excellent CMP, kidney function, liver enzymes.     Assessment and Plan:     68 y.o. female with the following issues:    1. Essential hypertension        2. LBBB (left bundle branch block)        3. Atherosclerosis of native coronary artery of native heart without angina pectoris        4. Mild aortic insufficiency        5. Aortic root enlargement (HCC)        6. LAKIA on CPAP  Referral to Pulmonary and Sleep Medicine      7. Chemotherapy-induced peripheral neuropathy (HCC)        8. Labia irritation  Referral to Gynecology      9. Uterine prolapse  Referral to Gynecology      10. History of squamous cell carcinoma in situ of skin  Referral to Dermatology      11. History of severe sun exposure  Referral to Dermatology      12. Encounter for screening mammogram for breast cancer  MA-SCREENING MAMMO BILAT W/TOMOSYNTHESIS W/CAD            Followup: Return in about 6 months (around 11/17/2023), or if symptoms worsen or fail to improve.

## 2023-05-21 NOTE — PROGRESS NOTES
Chief Complaint   Patient presents with    Hypertension    Cardiomyopathy (Non-ischemic)       Subjective     Karla Villeda is a 68 y.o. female who presents today for follow-up cardiac care.    Previously followed by Robert Kidd MD last seen 11/21/2022.    The patient has NICM with recovered LVEF, LBBB, coronary calcification (calcium score 108), aortic insufficiency, dyslipidemia, LAKIA on CPAP, B-cell lymphoma 2012 with chemotherapy, peripheral neuropathy (chemotherapy induced).    Since her last appointment 11/21/2022 the patient had a coronary calcium scan with a calcium score of 108.  Her atorvastatin dose was increased from 10 mg daily to 20 mg daily.  She walks 3 miles 2-3 times a week without any symptoms of chest pain or shortness of breath.  She has a daily palpitation that lasts seconds.    The patient reports undergoing chemotherapy for her lymphoma in 2012 while living in [place], [place].  Shortly after her first chemotherapy treatment she developed bradycardia and required hospitalization for up to 5 days.  In 2017 she was discovered to have a left bundle branch block.  An initial echocardiogram showed an LVEF of 50% and she was started on metoprolol.  A follow-up echocardiogram showed an LVEF of less than 50% was started on losartan.  Had coronary angiograms in 2012 and 2018 [place], [place] and was told that she had normal coronary arteries.      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    coccidiodies Pneumonia in 1998    History of squamous cell carcinoma in situ of skin 11/17/2022 6/2022      LBBB (left bundle branch block) 1/7/2020    LAKIA on CPAP 1/7/2020     Past Surgical History:    Procedure Laterality Date    LUMBAR LAMINECTOMY DISKECTOMY      LYMPH NODE EXCISION      TONSILLECTOMY      TONSILLECTOMY AND ADENOIDECTOMY      TUBAL COAGULATION LAPAROSCOPIC BILATERAL       Family History   Problem Relation Age of Onset    Hypertension Mother     Lung Disease Father     Cancer Father         lung    Cancer Brother         melanoma    Cancer Brother         liver, testicular    Sleep Apnea Neg Hx     Heart Attack Neg Hx      Social History     Socioeconomic History    Marital status:      Spouse name: Not on file    Number of children: Not on file    Years of education: Not on file    Highest education level: Not on file   Occupational History    Not on file   Tobacco Use    Smoking status: Never    Smokeless tobacco: Never   Vaping Use    Vaping Use: Never used   Substance and Sexual Activity    Alcohol use: Yes     Comment: 1 a week     Drug use: Never    Sexual activity: Yes     Partners: Male   Other Topics Concern    Not on file   Social History Narrative    Not on file     Social Determinants of Health     Financial Resource Strain: Not on file   Food Insecurity: Not on file   Transportation Needs: Not on file   Physical Activity: Not on file   Stress: Not on file   Social Connections: Not on file   Intimate Partner Violence: Not on file   Housing Stability: Not on file     Allergies   Allergen Reactions    Ampicillin      rash    Biaxin [Clarithromycin]      rash    Sulfa Drugs      rash     Outpatient Encounter Medications as of 5/31/2023   Medication Sig Dispense Refill    chlorhexidine (PERIDEX) 0.12 % Solution RINSE AND SPIT WITH 1 CAPFUL AFTER BRUSHING AND FLOSSING 3 TIMES A DAY      atorvastatin (LIPITOR) 20 MG Tab Take 1 Tablet by mouth every evening. 90 Tablet 3    metoprolol SR (TOPROL XL) 25 MG TABLET SR 24 HR Take 1 Tablet by mouth every day. 90 Tablet 3    losartan (COZAAR) 25 MG Tab Take 1 Tablet by mouth every day. 90 Tablet 3    Omega-3 1000 MG Cap Take 1 Capsule by  "mouth every day.      gabapentin (NEURONTIN) 100 MG Cap TAKE 1 CAPSULE BY MOUTH THREE TIMES A  Capsule 3    Multiple Vitamins-Minerals (MULTIVITAMIN ADULT PO) Take  by mouth.      Calcium Carb-Cholecalciferol (CALCIUM + D3 PO) Take  by mouth.      [DISCONTINUED] tizanidine (ZANAFLEX) 2 MG tablet PLEASE SEE ATTACHED FOR DETAILED DIRECTIONS      [DISCONTINUED] HYDROcodone-acetaminophen (NORCO) 5-325 MG Tab per tablet TAKE 1 TABLET BY MOUTH EVERY 4 TO 6 HOURS AS NEEDED      [DISCONTINUED] dexamethasone (DECADRON) 4 MG Tab PLEASE SEE ATTACHED FOR DETAILED DIRECTIONS      [DISCONTINUED] clindamycin (CLEOCIN) 300 MG Cap TAKE 1 CAPSULE 3 TIMES A DAY UNTIL COMPLETED       No facility-administered encounter medications on file as of 5/31/2023.     Review of Systems   Respiratory:  Negative for cough and shortness of breath.    Cardiovascular:  Negative for chest pain and palpitations.   Musculoskeletal:  Negative for myalgias.   Neurological:  Negative for dizziness and loss of consciousness.              Objective     /78 (BP Location: Left arm, Patient Position: Sitting, BP Cuff Size: Adult)   Pulse 69   Resp 16   Ht 1.727 m (5' 8\")   Wt 83.9 kg (185 lb)   SpO2 96%   BMI 28.13 kg/m²     Physical Exam  Vitals reviewed.   Constitutional:       General: She is not in acute distress.     Appearance: She is well-developed.   Eyes:      Conjunctiva/sclera: Conjunctivae normal.      Pupils: Pupils are equal, round, and reactive to light.   Neck:      Vascular: No JVD.   Cardiovascular:      Rate and Rhythm: Normal rate and regular rhythm.      Pulses: Normal pulses.      Heart sounds: Normal heart sounds. No murmur heard.     No friction rub. No gallop.   Pulmonary:      Effort: Pulmonary effort is normal. No accessory muscle usage or respiratory distress.      Breath sounds: Normal breath sounds. No wheezing or rales.   Abdominal:      General: There is no distension.      Palpations: Abdomen is soft. There is " no mass.      Tenderness: There is no abdominal tenderness.      Comments: Palpable aorta   Musculoskeletal:      Cervical back: Normal range of motion and neck supple.      Right lower leg: No edema.      Left lower leg: No edema.   Skin:     General: Skin is warm and dry.      Findings: No rash.      Nails: There is no clubbing.   Neurological:      Mental Status: She is alert and oriented to person, place, and time.   Psychiatric:         Behavior: Behavior normal.              Coronary CALCIUM SCAN 11/30/2022  Coronary calcification:  LMA - 0.0  LCX - 0.0  LAD - 86.8  RCA - 21.2  Total Calcium Score: 108      ZioPatch Summary: 7 day monitor beginning 11-17-21.   1.  Sinus rhythm, BBB, with appropriate heart rate range. Average 82, range 56 to 145 bpm.   2.  Normal sinus node and AV node conduction normal. No pauses.   3.  Rare PACs.   4.  Rare PVCs.   5.  12 runs of SVT, up to 17 beats. No sustained rhythm changes.   6.  4 patient triggers during sinus with PACs and SVT, symptoms reported include skipped, irregular beats.  Conclusion: Sinus, brief runs of SVT. Symptomatic SVT and PACs. No sustained rhythm changes.     ECHOCARDIOGRAM 1/20/2021  Normal left ventricular size, wall thickness, and systolic function.  Normal right ventricular size and systolic function.  Mild aortic insufficiency.  Estimated right ventricular systolic pressure  is 25 mmHg; normal.  Normal pericardium without effusion.  Ascending aorta diameter is 3.9 cm; borderline dilated.      No prior study is available for comparison.   Assessment & Plan     1. Abdominal pulsatile mass  US-ABDOMINAL AORTA W/O DOPPLER      2. Nonischemic cardiomyopathy (HCC)        3. LBBB (left bundle branch block)        4. Atherosclerosis of native coronary artery of native heart without angina pectoris        5. Mild aortic insufficiency        6. Dyslipidemia            Medical Decision Making: Today's Assessment/Status/Plan:   NICM with recovered LVEF,  presumed related to chemotherapy and LBBB, continue metoprolol, losartan   LBBB, asymptomatic, continue to monitor.  Coronary calcification, asymptomatic with no ischemic symptoms, continue primary ASCVD therapy.  Aortic regurgitation, asymptomatic, continue to monitor, echocardiograms as needed  Dyslipidemia, reviewed most recent lipid panel on higher dose of atorvastatin, LDL 47, at goal, continue atorvastatin 20 mg daily  Palpable abdominal aorta, abdominal ultrasound.  Borderline dilated ascending aorta, asymptomatic, continue blood pressure control  RTC 6 months

## 2023-05-25 ENCOUNTER — TELEPHONE (OUTPATIENT)
Dept: HEALTH INFORMATION MANAGEMENT | Facility: OTHER | Age: 68
End: 2023-05-25
Payer: MEDICARE

## 2023-05-31 ENCOUNTER — OFFICE VISIT (OUTPATIENT)
Dept: CARDIOLOGY | Facility: MEDICAL CENTER | Age: 68
End: 2023-05-31
Attending: INTERNAL MEDICINE
Payer: MEDICARE

## 2023-05-31 ENCOUNTER — TELEPHONE (OUTPATIENT)
Dept: HEALTH INFORMATION MANAGEMENT | Facility: OTHER | Age: 68
End: 2023-05-31

## 2023-05-31 VITALS
HEIGHT: 68 IN | DIASTOLIC BLOOD PRESSURE: 78 MMHG | OXYGEN SATURATION: 96 % | WEIGHT: 185 LBS | SYSTOLIC BLOOD PRESSURE: 120 MMHG | BODY MASS INDEX: 28.04 KG/M2 | HEART RATE: 69 BPM | RESPIRATION RATE: 16 BRPM

## 2023-05-31 DIAGNOSIS — I44.7 LBBB (LEFT BUNDLE BRANCH BLOCK): ICD-10-CM

## 2023-05-31 DIAGNOSIS — I35.1 MILD AORTIC INSUFFICIENCY: ICD-10-CM

## 2023-05-31 DIAGNOSIS — E78.5 DYSLIPIDEMIA: ICD-10-CM

## 2023-05-31 DIAGNOSIS — R19.00 ABDOMINAL PULSATILE MASS: ICD-10-CM

## 2023-05-31 DIAGNOSIS — I25.10 ATHEROSCLEROSIS OF NATIVE CORONARY ARTERY OF NATIVE HEART WITHOUT ANGINA PECTORIS: ICD-10-CM

## 2023-05-31 DIAGNOSIS — I42.8 NONISCHEMIC CARDIOMYOPATHY (HCC): ICD-10-CM

## 2023-05-31 PROBLEM — I10 ESSENTIAL HYPERTENSION: Status: RESOLVED | Noted: 2020-01-07 | Resolved: 2023-05-31

## 2023-05-31 PROCEDURE — 99213 OFFICE O/P EST LOW 20 MIN: CPT | Performed by: INTERNAL MEDICINE

## 2023-05-31 PROCEDURE — 99212 OFFICE O/P EST SF 10 MIN: CPT | Performed by: INTERNAL MEDICINE

## 2023-05-31 PROCEDURE — 99214 OFFICE O/P EST MOD 30 MIN: CPT | Performed by: INTERNAL MEDICINE

## 2023-05-31 PROCEDURE — 3078F DIAST BP <80 MM HG: CPT | Performed by: INTERNAL MEDICINE

## 2023-05-31 PROCEDURE — 3074F SYST BP LT 130 MM HG: CPT | Performed by: INTERNAL MEDICINE

## 2023-05-31 ASSESSMENT — ENCOUNTER SYMPTOMS
SLEEP DISTURBANCE: 0
PALPITATIONS: 0
SHORTNESS OF BREATH: 0
MYALGIAS: 0
DIZZINESS: 0
LOSS OF CONSCIOUSNESS: 0
COUGH: 0

## 2023-05-31 ASSESSMENT — FIBROSIS 4 INDEX: FIB4 SCORE: 1.64

## 2023-06-01 ENCOUNTER — OFFICE VISIT (OUTPATIENT)
Dept: DERMATOLOGY | Facility: IMAGING CENTER | Age: 68
End: 2023-06-01
Payer: MEDICARE

## 2023-06-01 DIAGNOSIS — D22.9 MULTIPLE NEVI: ICD-10-CM

## 2023-06-01 DIAGNOSIS — L57.0 ACTINIC KERATOSIS: ICD-10-CM

## 2023-06-01 DIAGNOSIS — L82.1 SK (SEBORRHEIC KERATOSIS): ICD-10-CM

## 2023-06-01 DIAGNOSIS — Z12.83 SKIN CANCER SCREENING: ICD-10-CM

## 2023-06-01 DIAGNOSIS — L81.4 LENTIGINES: ICD-10-CM

## 2023-06-01 DIAGNOSIS — D18.01 CHERRY ANGIOMA: ICD-10-CM

## 2023-06-01 DIAGNOSIS — L85.1 STUCCO KERATOSES: ICD-10-CM

## 2023-06-01 DIAGNOSIS — Z85.828 HISTORY OF SCC (SQUAMOUS CELL CARCINOMA) OF SKIN: ICD-10-CM

## 2023-06-01 PROCEDURE — 17000 DESTRUCT PREMALG LESION: CPT | Performed by: NURSE PRACTITIONER

## 2023-06-01 PROCEDURE — 99213 OFFICE O/P EST LOW 20 MIN: CPT | Mod: 25 | Performed by: NURSE PRACTITIONER

## 2023-06-01 PROCEDURE — 17003 DESTRUCT PREMALG LES 2-14: CPT | Performed by: NURSE PRACTITIONER

## 2023-06-01 ASSESSMENT — ENCOUNTER SYMPTOMS: SLEEP DISTURBANCE: 0

## 2023-06-01 NOTE — PROGRESS NOTES
DERMATOLOGY NOTE  FOLLOW UP VISIT       Chief complaint: Follow-Up (IGLESIA)  Pt has some spots on hands, neck, shoulder and left ear for evaluation      History of skin cancer: Yes 06/2022 SCC Rt Pretibial had Moh's in July  History of precancers/actinic keratoses: Yes, Details: hands, arms, treated with liquid nitrogen and Efudex on forhead and arms  History of biopsies:Yes  History of blistering/severe sunburns:Yes, Details: childhood  Family history of skin cancer:Yes, Details: father SCC, brother melanoma  Family history of atypical moles:No        Allergies   Allergen Reactions    Ampicillin      rash    Biaxin [Clarithromycin]      rash    Sulfa Drugs      rash        MEDICATIONS:  Medications relevant to specialty reviewed.     REVIEW OF SYSTEMS:   Positive for skin (see HPI)  Negative for fevers and chills       EXAM:  There were no vitals taken for this visit.  Constitutional: Well-developed, well-nourished, and in no distress.     A total body skin exam was performed excluding the genitals per patient preference and including the following areas: head (including face), neck, chest, abdomen, groin/buttocks, back, bilateral upper extremities, and bilateral lower extremities with the following pertinent findings listed below and/or in assessment/plan.     -sun exposed skin of trunk and b/l upper, lower extremities and face with scattered clinically benign light brown reticulated macules all of which were morphologically similar and none of which were suspicious for skin cancer today on exam    -Several scattered 1-3mm bright red macules and thin papules on the trunk and extremities    -Multiple tan medium brown skin-colored macules papules scattered over the trunk >> extremities, All with benign-appearing pigment network patterns on dermoscopy     -Several tan skin-colored stuck-on waxy papules scattered on the trunk and extremities    -Stucco keratosis bilateral lower extremities    -Ill-defined erythematous  gritty/scaly papule over the Left helix, posterior neck, bilateral dorsum of hands, R shoulder    IMPRESSION / PLAN:    1. Actinic keratosis  CRYOTHERAPY:  Risks (including, but not limited to: skin discoloration, redness, blister, blood blister, recurrence, need for further treatment, infection, scar) and benefits of cryotherapy discussed. Patient verbally agreed to proceed with treatment. 1 cryotherapy freeze thaw cycles of 10 seconds were applied to 7 lesions on areas as noted on exam with cryac. Patient tolerated procedure well. Aftercare instructions given--no specific care needed unless irritated during healing process, can apply Vaseline with small band-aid if needed.      2. Lentigines  - Benign-appearing nature of lesions discussed during exam.   - Advised to continue to monitor for any return to clinic for new or concerning changes.      3. Cherry angioma  - Benign-appearing nature of lesions discussed during exam.   - Advised to continue to monitor for any return to clinic for new or concerning changes.      4. Multiple nevi  - Benign-appearing nature of lesions discussed during exam.   - Advised to continue to monitor for any return to clinic for new or concerning changes.  - ABCDE's of melanoma discussed/handout given      5. SK (seborrheic keratosis)  - Benign-appearing nature of lesions discussed during exam.   - Advised to continue to monitor for any return to clinic for new or concerning changes.      6. Stucco keratoses  - Benign-appearing nature of lesions discussed during exam.   - Advised to continue to monitor for any return to clinic for new or concerning changes.      7. History of SCC (squamous cell carcinoma) of skin  Q6mo TSEs    8. Skin cancer screening  Skin cancer education  discussed importance of sun protective clothing, eyewear in addition to the use of broad spectrum sunscreen with SPF 30 or greater, as well as need for reapplication ~every 2 hours when exposed to UVR  discussed  importance following up for any new or changing lesions as noted in handout given, but every 6 months exams in clinic in the setting of dermatologic history  ABCDE's of melanoma discussed/handout given        Pt understands risks associated with LN2, Patient verbalized understanding and agrees with plan regarding the above          Please note that this dictation was created using voice recognition software. I have made every reasonable attempt to correct obvious errors, but I expect that there are errors of grammar and possibly content that I did not discover before finalizing the note.      Return to clinic in: Return in about 6 months (around 12/1/2023) for IGLESIA. and as needed for any new or changing skin lesions.

## 2023-06-02 ENCOUNTER — APPOINTMENT (OUTPATIENT)
Dept: SLEEP MEDICINE | Facility: MEDICAL CENTER | Age: 68
End: 2023-06-02
Attending: FAMILY MEDICINE
Payer: MEDICARE

## 2023-06-02 VITALS
HEIGHT: 68 IN | BODY MASS INDEX: 27.28 KG/M2 | HEART RATE: 74 BPM | SYSTOLIC BLOOD PRESSURE: 112 MMHG | RESPIRATION RATE: 16 BRPM | WEIGHT: 180 LBS | DIASTOLIC BLOOD PRESSURE: 60 MMHG | OXYGEN SATURATION: 94 %

## 2023-06-02 DIAGNOSIS — G47.33 OSA ON CPAP: ICD-10-CM

## 2023-06-02 PROCEDURE — 99213 OFFICE O/P EST LOW 20 MIN: CPT | Mod: 24 | Performed by: NURSE PRACTITIONER

## 2023-06-02 PROCEDURE — 99212 OFFICE O/P EST SF 10 MIN: CPT | Performed by: NURSE PRACTITIONER

## 2023-06-02 PROCEDURE — 3074F SYST BP LT 130 MM HG: CPT | Performed by: NURSE PRACTITIONER

## 2023-06-02 PROCEDURE — 3078F DIAST BP <80 MM HG: CPT | Performed by: NURSE PRACTITIONER

## 2023-06-02 RX ORDER — LEVOFLOXACIN 500 MG/1
TABLET, FILM COATED ORAL
COMMUNITY
Start: 2023-05-23 | End: 2023-11-14

## 2023-06-02 ASSESSMENT — FIBROSIS 4 INDEX: FIB4 SCORE: 1.64

## 2023-06-02 NOTE — PROGRESS NOTES
No chief complaint on file.      HPI:  Karla Villeda is a 68 y.o. year old female here today for follow-up on LAKIA.  Last seen via telemedicine on 6/2/2020 by Dr. Bingham.  Completed a sleep study on 8/27/2020 which showed evidence of moderate LAKIA with an VICKI of 14/h with a O2 conchita of 80%.  He never followed up after this test for results.    She was  was diagnosed with LAKIA about 13 years ago in [facility] and has been on CPAP since then. Her current CPAP is about 5 years old. She has moved to [place] and is here to re-establish care.The initial presenting symptoms were snoring, witnessed apneas.    Patient is currently on a Joselyn Respir[employer] Sways CPAP that was not yet registered with the recall program.  She denies using so clean or any symptoms associated with the recall.  Currently using a Simplus fullface mask and occasionally leaks when laying flat on her back.  Overall she notes improved sleep quality using CPAP.  She also uses heated tubing.  She gets an average of 7 hours of total sleep and will use a CPAP for 5 to 6 hours nightly.  She denies any difficulty falling or staying asleep.  Overall she notes improvement in sleep quality using CPAP and denies any excessive daytime sleepiness, morning headaches, palpitations, concentration or memory problems.    Compliance    ROS: As per HPI and otherwise negative if not stated.    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    coccidiodies Pneumonia in 1998    History of squamous cell carcinoma in situ of skin 11/17/2022 6/2022      LBBB (left bundle branch block) 1/7/2020    LAKIA on CPAP 1/7/2020       Past Surgical History:   Procedure Laterality Date    LUMBAR LAMINECTOMY  DISKECTOMY      LYMPH NODE EXCISION      TONSILLECTOMY      TONSILLECTOMY AND ADENOIDECTOMY      TUBAL COAGULATION LAPAROSCOPIC BILATERAL         Family History   Problem Relation Age of Onset    Hypertension Mother     Lung Disease Father     Cancer Father         lung    Cancer Brother         melanoma    Cancer Brother         liver, testicular    Sleep Apnea Neg Hx     Heart Attack Neg Hx        Allergies as of 06/02/2023 - Reviewed 06/01/2023   Allergen Reaction Noted    Ampicillin  01/07/2020    Biaxin [clarithromycin]  01/07/2020    Sulfa drugs  01/07/2020        Vitals:  There were no vitals taken for this visit.    Current medications as of today   Current Outpatient Medications   Medication Sig Dispense Refill    chlorhexidine (PERIDEX) 0.12 % Solution RINSE AND SPIT WITH 1 CAPFUL AFTER BRUSHING AND FLOSSING 3 TIMES A DAY      atorvastatin (LIPITOR) 20 MG Tab Take 1 Tablet by mouth every evening. 90 Tablet 3    metoprolol SR (TOPROL XL) 25 MG TABLET SR 24 HR Take 1 Tablet by mouth every day. 90 Tablet 3    losartan (COZAAR) 25 MG Tab Take 1 Tablet by mouth every day. 90 Tablet 3    Omega-3 1000 MG Cap Take 1 Capsule by mouth every day.      gabapentin (NEURONTIN) 100 MG Cap TAKE 1 CAPSULE BY MOUTH THREE TIMES A  Capsule 3    Multiple Vitamins-Minerals (MULTIVITAMIN ADULT PO) Take  by mouth.      Calcium Carb-Cholecalciferol (CALCIUM + D3 PO) Take  by mouth.       No current facility-administered medications for this visit.         Physical Exam:   Gen:           Alert and oriented, No apparent distress. Mood and affect appropriate, normal interaction with examiner.  Eyes:          PERRL, EOM intact, sclere white, conjunctive moist.  Ears:          Not examined.   Hearing:     Grossly intact.  Nose:          Normal, no lesions or deformities.  Dentition:    Good dentition.  Oropharynx:   Tongue normal, posterior pharynx without   Neck:        Supple, trachea midline, no masses.  Respiratory  Effort: No intercostal retractions or use of accessory muscles.   Lung Auscultation:      Clear to auscultation bilaterally; no rales, rhonchi or wheezing.  CV:            Regular rate and rhythm. No murmurs, rubs or gallops.  Abd:           Not examined.   Lymphadenopathy: Not examined.  Gait and Station: Normal.  Digits and Nails: No clubbing, cyanosis, petechiae, or nodes.   Cranial Nerves: II-XII grossly intact.  Skin:        No rashes, lesions or ulcers noted.               Ext:           No cyanosis or edema.      Assessment:  1. LAKIA on CPAP            Plan:  Patient is using and benefiting from CPAP therapy.  She notes improved sleep quality using CPAP.  Order placed for mask and supplies which will be good for 1 year.  Sent to [employer].  Also advised of recall program.  Information given on where to register device.  Patient will follow-up in 1 year, but can be seen sooner if needed.    Please note that this dictation was created using voice recognition software. I have made every reasonable attempt to correct obvious errors, but it is possible there are errors of grammar and possibly content that I did not discover before finalizing the note.

## 2023-06-08 ENCOUNTER — DOCUMENTATION (OUTPATIENT)
Dept: HEALTH INFORMATION MANAGEMENT | Facility: OTHER | Age: 68
End: 2023-06-08
Payer: MEDICARE

## 2023-06-27 ENCOUNTER — HOSPITAL ENCOUNTER (OUTPATIENT)
Dept: RADIOLOGY | Facility: MEDICAL CENTER | Age: 68
End: 2023-06-27
Attending: INTERNAL MEDICINE
Payer: MEDICARE

## 2023-06-27 DIAGNOSIS — R19.00 ABDOMINAL PULSATILE MASS: ICD-10-CM

## 2023-06-27 PROCEDURE — 76775 US EXAM ABDO BACK WALL LIM: CPT

## 2023-08-21 DIAGNOSIS — I10 ESSENTIAL HYPERTENSION: ICD-10-CM

## 2023-08-21 RX ORDER — LOSARTAN POTASSIUM 25 MG/1
25 TABLET ORAL
Qty: 100 TABLET | Refills: 3 | Status: SHIPPED | OUTPATIENT
Start: 2023-08-21

## 2023-08-30 ENCOUNTER — GYNECOLOGY VISIT (OUTPATIENT)
Dept: OBGYN | Facility: CLINIC | Age: 68
End: 2023-08-30
Payer: MEDICARE

## 2023-08-30 VITALS — SYSTOLIC BLOOD PRESSURE: 120 MMHG | BODY MASS INDEX: 28.74 KG/M2 | WEIGHT: 189 LBS | DIASTOLIC BLOOD PRESSURE: 68 MMHG

## 2023-08-30 DIAGNOSIS — N81.10 PELVIC ORGAN PROLAPSE QUANTIFICATION STAGE 2 CYSTOCELE: ICD-10-CM

## 2023-08-30 PROCEDURE — 99203 OFFICE O/P NEW LOW 30 MIN: CPT | Performed by: OBSTETRICS & GYNECOLOGY

## 2023-08-30 ASSESSMENT — FIBROSIS 4 INDEX: FIB4 SCORE: 1.64

## 2023-08-30 NOTE — PROGRESS NOTES
Chief complaint: Possible prolapse    Karla Villeda is a 68 y.o.  female who presents for her Annual Gynecologic Exam      HPI Comments: Pt presents for her annual well woman exam.   Patient reports that while she was in the shower a few weeks ago, she felt a bulge in her vaginal area.  It does not cause her any pain or discomfort.    No LMP recorded (lmp unknown). Patient is postmenopausal.      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) 2012    In remission    Chemotherapy-induced peripheral neuropathy (HCC) 2021    Chickenpox     Essential hypertension 2020    Micronesian measles     History of B-cell lymphoma 2021    History of coccidioidomycosis 2021    coccidiodies Pneumonia in 1998    History of squamous cell carcinoma in situ of skin 2022      LBBB (left bundle branch block) 2020    LAKIA on CPAP 2020     Past Surgical History:   Procedure Laterality Date    LUMBAR LAMINECTOMY DISKECTOMY      LYMPH NODE EXCISION      TONSILLECTOMY      TONSILLECTOMY AND ADENOIDECTOMY      TUBAL COAGULATION LAPAROSCOPIC BILATERAL       Medications:   Current Outpatient Medications Ordered in Epic   Medication Sig Dispense Refill    losartan (COZAAR) 25 MG Tab TAKE 1 TABLET BY MOUTH EVERY  Tablet 3    atorvastatin (LIPITOR) 20 MG Tab Take 1 Tablet by mouth every evening. 90 Tablet 3    metoprolol SR (TOPROL XL) 25 MG TABLET SR 24 HR Take 1 Tablet by mouth every day. 90 Tablet 3    Omega-3 1000 MG Cap Take 1 Capsule by mouth every day.      gabapentin (NEURONTIN) 100 MG Cap TAKE 1 CAPSULE BY MOUTH THREE TIMES A  Capsule 3    Multiple Vitamins-Minerals (MULTIVITAMIN ADULT PO) Take  by mouth.      Calcium  Carb-Cholecalciferol (CALCIUM + D3 PO) Take  by mouth.      levoFLOXacin (LEVAQUIN) 500 MG tablet TAKE 1 TABLET BY MOUTH EVERY DAY UNTIL FINISHED (Patient not taking: Reported on 6/2/2023)      chlorhexidine (PERIDEX) 0.12 % Solution RINSE AND SPIT WITH 1 CAPFUL AFTER BRUSHING AND FLOSSING 3 TIMES A DAY       No current Epic-ordered facility-administered medications on file.     Ampicillin, Biaxin [clarithromycin], and Sulfa drugs  Social History     Socioeconomic History    Marital status:    Tobacco Use    Smoking status: Never    Smokeless tobacco: Never   Vaping Use    Vaping Use: Never used   Substance and Sexual Activity    Alcohol use: Yes     Comment: 1 a week     Drug use: Never    Sexual activity: Yes     Partners: Male     Family History   Problem Relation Age of Onset    Hypertension Mother     Lung Disease Father     Cancer Father         lung    Cancer Brother         melanoma    Cancer Brother         liver, testicular    Sleep Apnea Neg Hx     Heart Attack Neg Hx           Objective:   Vital measurements:  Wt 189 lb   Body mass index is 28.74 kg/m². (Goal BM I>18 <25)    Physical Exam   Nursing note and vitals reviewed.  Constitutional: She is oriented to person, place, and time. She appears well-developed and well-nourished. No distress.     HEENT:   Head: Normocephalic and atraumatic.   Right Ear: External ear normal.   Left Ear: External ear normal.   Nose: Nose normal.   Eyes: Conjunctivae and EOM are normal. Pupils are equal, round, and reactive to light. No scleral icterus.     Neck: Normal range of motion.     Abdominal: Soft. . She exhibits no distension and no mass. No tenderness. She has no rebound and no guarding.     Genitourinary:  Pelvic exam was performed with patient supine.  External genitalia with no abnormal pigmentation, labial fusion, rash, tenderness, lesion or injury to the labia bilaterally.  BUS normal  Vagina is pink and moist with no lesions, foul discharge,  erythema, tenderness or bleeding. No foreign body around the vagina or signs of injury.   Cervix exhibits no motion tenderness, no discharge and no friability, no lesions.   Uterus is 6 and meters and not deviated, not enlarged, not fixed and not tender.  Right adnexa displays no mass, no tenderness and no fullness.  Left adnexa displays no mass, no tenderness and no fullness.     There is noted to be some anterior and posterior compartment prolapse at this time.  Leading edge approximately 1 cm from introitus.  No loss of urine with cough/Valsalva maneuver    Musculoskeletal: Normal range of motion. Non tender. She exhibits no edema and no tenderness.     Neurological: She is alert and oriented to person, place, and time. She exhibits normal muscle tone.     Skin: Skin is warm and dry. No rash noted. She is not diaphoretic. No erythema. No pallor.     Psychiatric: She has a normal mood and affect. Her behavior is normal. Judgment and thought content normal.        Assessment:     1. Pelvic organ prolapse quantification stage 2 cystocele              Plan:   Findings discussed with patient.  There is noted to be some prolapse of the anterior and posterior compartments.  When asked if this had any effect on her quality of life, patient reports that it does not affect her life at all she just noticed it in the last few weeks while taking a shower and would like to have it evaluated.  At this time, since the patient has no symptoms or complications or worsening of her quality of life due to this prolapse, I recommended against any type of intervention and continue with expectant management.    All questions answered

## 2023-08-30 NOTE — PROGRESS NOTES
NP here today for GYN appt  Labia irritation and uterine prolapse  Phone # [phone] (home)   C/o possible prolapse

## 2023-11-14 ENCOUNTER — OFFICE VISIT (OUTPATIENT)
Dept: MEDICAL GROUP | Facility: MEDICAL CENTER | Age: 68
End: 2023-11-14
Payer: MEDICARE

## 2023-11-14 VITALS
TEMPERATURE: 98 F | OXYGEN SATURATION: 96 % | SYSTOLIC BLOOD PRESSURE: 124 MMHG | DIASTOLIC BLOOD PRESSURE: 72 MMHG | RESPIRATION RATE: 18 BRPM | HEIGHT: 68 IN | WEIGHT: 188.49 LBS | BODY MASS INDEX: 28.57 KG/M2 | HEART RATE: 73 BPM

## 2023-11-14 DIAGNOSIS — I44.7 LBBB (LEFT BUNDLE BRANCH BLOCK): ICD-10-CM

## 2023-11-14 DIAGNOSIS — Z86.007 HISTORY OF SQUAMOUS CELL CARCINOMA IN SITU OF SKIN: ICD-10-CM

## 2023-11-14 DIAGNOSIS — Z12.11 COLON CANCER SCREENING: ICD-10-CM

## 2023-11-14 DIAGNOSIS — Z91.89 HISTORY OF SEVERE SUN EXPOSURE: ICD-10-CM

## 2023-11-14 DIAGNOSIS — G62.9 NEUROPATHY: ICD-10-CM

## 2023-11-14 DIAGNOSIS — T45.1X5A CHEMOTHERAPY-INDUCED PERIPHERAL NEUROPATHY (HCC): ICD-10-CM

## 2023-11-14 DIAGNOSIS — I10 ESSENTIAL HYPERTENSION: ICD-10-CM

## 2023-11-14 DIAGNOSIS — G62.0 CHEMOTHERAPY-INDUCED PERIPHERAL NEUROPATHY (HCC): ICD-10-CM

## 2023-11-14 DIAGNOSIS — Z85.72 HISTORY OF B-CELL LYMPHOMA: ICD-10-CM

## 2023-11-14 PROBLEM — N90.89 LABIA IRRITATION: Status: RESOLVED | Noted: 2023-05-17 | Resolved: 2023-11-14

## 2023-11-14 PROCEDURE — 99214 OFFICE O/P EST MOD 30 MIN: CPT | Performed by: FAMILY MEDICINE

## 2023-11-14 PROCEDURE — 3074F SYST BP LT 130 MM HG: CPT | Performed by: FAMILY MEDICINE

## 2023-11-14 PROCEDURE — 3078F DIAST BP <80 MM HG: CPT | Performed by: FAMILY MEDICINE

## 2023-11-14 RX ORDER — GABAPENTIN 100 MG/1
CAPSULE ORAL
Qty: 270 CAPSULE | Refills: 3 | Status: SHIPPED | OUTPATIENT
Start: 2023-11-14

## 2023-11-14 RX ORDER — METOPROLOL SUCCINATE 25 MG/1
25 TABLET, EXTENDED RELEASE ORAL DAILY
Qty: 90 TABLET | Refills: 3 | Status: SHIPPED | OUTPATIENT
Start: 2023-11-14

## 2023-11-14 ASSESSMENT — FIBROSIS 4 INDEX: FIB4 SCORE: 1.64

## 2023-11-14 NOTE — PROGRESS NOTES
Chief Complaint   Patient presents with    Follow-Up     Q6M      Hypertension    Nerve Pain       Subjective:     HPI:   Karla Villeda presents today with the followin. History of B-cell lymphoma  Patient does have a history of B-cell lymphoma.  Denies night sweats or unusual fatigue.  Follow-up lab orders discussed and placed.    2. Essential hypertension  HTN - Chronic condition stable. Currently taking all meds as directed.   She is not taking baby aspirin daily.   She is monitoring BP at home. Checks once a month or more.  Has been 120s or lower/70s.  Denies symptoms low BP: light-headed, tunnel-vision, unusual fatigue.   Denies symptoms high BP:pounding headache, visual changes, palpitations, flushed face.   Denies medicine side effects: unusual fatigue, slow heartbeat, foot/leg swelling, cough.  Follow-up lab orders discussed and placed    3. Neuropathy/Chemotherapy-induced peripheral neuropathy (HCC)  Patient has persistent neuropathy.  She also has some restless leg syndrome.  She finds the low-dose gabapentin continues to be quite helpful.  If she accidentally misses a dose she does have restless leg syndrome and some pain.  The gabapentin is renewed.    4. LBBB (left bundle branch block)  She is on metoprolol which was prescribed to her due to her left bundle branch block.  She has been doing well.  Her last echocardiogram showed returned to normal ejection fraction.  Patient denies any palpitations or increase in shortness of breath.  Metoprolol 25 mg sustained-release is renewed.    5. Colon cancer screening  Fecal immunoassay testing recommended and order placed.  Denies any change in bowel pattern or visible blood in the stool.    7. History of severe sun exposure/History of squamous cell carcinoma in situ of skin  Patient needs to continue follow-up with dermatology.  She does I believe have an upcoming appointment.  [facility]  Denies any new lesions  "though she does need a full body check with her history of squamous cell.        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 90 Tablet 3    losartan (COZAAR) 25 MG Tab TAKE 1 TABLET BY MOUTH EVERY  Tablet 3    atorvastatin (LIPITOR) 20 MG Tab Take 1 Tablet by mouth every evening. 90 Tablet 3    Omega-3 1000 MG Cap Take 1 Capsule by mouth every day.      Multiple Vitamins-Minerals (MULTIVITAMIN ADULT PO) Take  by mouth.      Calcium Carb-Cholecalciferol (CALCIUM + D3 PO) Take  by mouth.       No current facility-administered medications for this visit.       Allergies   Allergen Reactions    Ampicillin      rash    Biaxin [Clarithromycin]      rash    Sulfa Drugs      rash       ROS: As per HPI       Objective:     /72   Pulse 73   Temp 36.7 °C (98 °F)   Resp 18   Ht 1.727 m (5' 8\")   Wt 85.5 kg (188 lb 7.9 oz)   SpO2 96%  Body mass index is 28.66 kg/m².    Physical Exam:  Constitutional: Well-developed and well-nourished. Not diaphoretic. " No distress. Lucid and fluent.  Skin: Skin is warm and dry. No rash noted.  Head: Atraumatic without lesions.  Eyes: Conjunctivae and extraocular motions are normal. Pupils are equal, round, and reactive to light. No scleral icterus.   Ears:  External ears unremarkable.   Neck: Supple, trachea midline. No thyromegaly present. No cervical or supraclavicular lymphadenopathy. No JVD or carotid bruits appreciated  Cardiovascular: Regular rate and rhythm.  Normal S1, S2 without murmur appreciated.  Chest: Effort normal. Clear to auscultation throughout. No adventitious sounds.   Abdomen: Soft, non tender, and without distention. Active bowel sounds in all four quadrants. No rebound, guarding, masses or hepatosplenomegaly.  Extremities: No cyanosis, clubbing, erythema, nor edema.   Neurological: Alert and oriented x 3.  No tremor appreciated.  Psychiatric:  Behavior, mood, and affect are appropriate.       Assessment and Plan:     68 y.o. female with the following issues:    1. History of B-cell lymphoma  CBC WITH DIFFERENTIAL    LDH      2. Essential hypertension  metoprolol SR (TOPROL XL) 25 MG TABLET SR 24 HR      3. Neuropathy  gabapentin (NEURONTIN) 100 MG Cap      4. Chemotherapy-induced peripheral neuropathy (HCC)  gabapentin (NEURONTIN) 100 MG Cap      5. LBBB (left bundle branch block)  metoprolol SR (TOPROL XL) 25 MG TABLET SR 24 HR      6. Colon cancer screening  OCCULT BLOOD FECES IMMUNOASSAY      7. History of severe sun exposure  Referral to Dermatology      8. History of squamous cell carcinoma in situ of skin  Referral to Dermatology            Followup: Return in about 6 months (around 5/14/2024), or if symptoms worsen or fail to improve.

## 2023-12-04 ENCOUNTER — HOSPITAL ENCOUNTER (OUTPATIENT)
Dept: LAB | Facility: MEDICAL CENTER | Age: 68
End: 2023-12-04
Attending: FAMILY MEDICINE
Payer: MEDICARE

## 2023-12-04 DIAGNOSIS — Z85.72 HISTORY OF B-CELL LYMPHOMA: ICD-10-CM

## 2023-12-04 LAB
BASOPHILS # BLD AUTO: 0.4 % (ref 0–1.8)
BASOPHILS # BLD: 0.02 K/UL (ref 0–0.12)
EOSINOPHIL # BLD AUTO: 0.08 K/UL (ref 0–0.51)
EOSINOPHIL NFR BLD: 1.7 % (ref 0–6.9)
ERYTHROCYTE [DISTWIDTH] IN BLOOD BY AUTOMATED COUNT: 45.8 FL (ref 35.9–50)
HCT VFR BLD AUTO: 41.4 % (ref 37–47)
HGB BLD-MCNC: 13.8 G/DL (ref 12–16)
IMM GRANULOCYTES # BLD AUTO: 0.01 K/UL (ref 0–0.11)
IMM GRANULOCYTES NFR BLD AUTO: 0.2 % (ref 0–0.9)
LDH SERPL L TO P-CCNC: 199 U/L (ref 107–266)
LYMPHOCYTES # BLD AUTO: 1.94 K/UL (ref 1–4.8)
LYMPHOCYTES NFR BLD: 40.2 % (ref 22–41)
MCH RBC QN AUTO: 30.8 PG (ref 27–33)
MCHC RBC AUTO-ENTMCNC: 33.3 G/DL (ref 32.2–35.5)
MCV RBC AUTO: 92.4 FL (ref 81.4–97.8)
MONOCYTES # BLD AUTO: 0.45 K/UL (ref 0–0.85)
MONOCYTES NFR BLD AUTO: 9.3 % (ref 0–13.4)
NEUTROPHILS # BLD AUTO: 2.33 K/UL (ref 1.82–7.42)
NEUTROPHILS NFR BLD: 48.2 % (ref 44–72)
NRBC # BLD AUTO: 0 K/UL
NRBC BLD-RTO: 0 /100 WBC (ref 0–0.2)
PLATELET # BLD AUTO: 190 K/UL (ref 164–446)
PMV BLD AUTO: 10.1 FL (ref 9–12.9)
RBC # BLD AUTO: 4.48 M/UL (ref 4.2–5.4)
WBC # BLD AUTO: 4.8 K/UL (ref 4.8–10.8)

## 2023-12-04 PROCEDURE — 83615 LACTATE (LD) (LDH) ENZYME: CPT

## 2023-12-04 PROCEDURE — 85025 COMPLETE CBC W/AUTO DIFF WBC: CPT

## 2023-12-04 PROCEDURE — 36415 COLL VENOUS BLD VENIPUNCTURE: CPT

## 2023-12-05 ENCOUNTER — OFFICE VISIT (OUTPATIENT)
Dept: CARDIOLOGY | Facility: MEDICAL CENTER | Age: 68
End: 2023-12-05
Attending: INTERNAL MEDICINE
Payer: MEDICARE

## 2023-12-05 VITALS
HEART RATE: 75 BPM | DIASTOLIC BLOOD PRESSURE: 60 MMHG | WEIGHT: 188 LBS | OXYGEN SATURATION: 95 % | BODY MASS INDEX: 28.49 KG/M2 | SYSTOLIC BLOOD PRESSURE: 106 MMHG | HEIGHT: 68 IN | RESPIRATION RATE: 16 BRPM

## 2023-12-05 DIAGNOSIS — I35.1 MILD AORTIC INSUFFICIENCY: ICD-10-CM

## 2023-12-05 DIAGNOSIS — I42.8 NONISCHEMIC CARDIOMYOPATHY (HCC): ICD-10-CM

## 2023-12-05 DIAGNOSIS — I10 ESSENTIAL HYPERTENSION: ICD-10-CM

## 2023-12-05 DIAGNOSIS — I25.10 ATHEROSCLEROSIS OF NATIVE CORONARY ARTERY OF NATIVE HEART WITHOUT ANGINA PECTORIS: ICD-10-CM

## 2023-12-05 DIAGNOSIS — I44.7 LBBB (LEFT BUNDLE BRANCH BLOCK): ICD-10-CM

## 2023-12-05 DIAGNOSIS — E78.5 DYSLIPIDEMIA: ICD-10-CM

## 2023-12-05 LAB — EKG IMPRESSION: NORMAL

## 2023-12-05 PROCEDURE — 93010 ELECTROCARDIOGRAM REPORT: CPT | Performed by: INTERNAL MEDICINE

## 2023-12-05 PROCEDURE — 99214 OFFICE O/P EST MOD 30 MIN: CPT | Mod: 25 | Performed by: INTERNAL MEDICINE

## 2023-12-05 PROCEDURE — 3078F DIAST BP <80 MM HG: CPT | Performed by: INTERNAL MEDICINE

## 2023-12-05 PROCEDURE — 3074F SYST BP LT 130 MM HG: CPT | Performed by: INTERNAL MEDICINE

## 2023-12-05 PROCEDURE — 99212 OFFICE O/P EST SF 10 MIN: CPT | Performed by: INTERNAL MEDICINE

## 2023-12-05 PROCEDURE — 93005 ELECTROCARDIOGRAM TRACING: CPT | Performed by: INTERNAL MEDICINE

## 2023-12-05 ASSESSMENT — ENCOUNTER SYMPTOMS
SHORTNESS OF BREATH: 0
MYALGIAS: 0
COUGH: 0
LOSS OF CONSCIOUSNESS: 0
PALPITATIONS: 0
DIZZINESS: 0

## 2023-12-05 ASSESSMENT — FIBROSIS 4 INDEX: FIB4 SCORE: 1.67

## 2023-12-05 NOTE — PROGRESS NOTES
Chief Complaint   Patient presents with    Cardiomyopathy (Non-ischemic)    Hypertension    Dyslipidemia       Subjective     Karla Villeda is a 68 y.o. female who presents today for follow-up cardiac care.    Previously followed by Robert Kidd MD last seen 11/21/2022.    The patient has NICM with recovered LVEF, LBBB, coronary calcification (calcium score 108), aortic insufficiency, dyslipidemia, LAKIA on CPAP, B-cell lymphoma 2012 with chemotherapy, peripheral neuropathy (chemotherapy induced).    Since 5/31/2023 appointment the patient has had no cardiac symptoms including chest pain, palpitations, shortness of breath.    The patient reports undergoing chemotherapy for her lymphoma in 2012 while living in [place], [place].  Shortly after her first chemotherapy treatment she developed bradycardia and required hospitalization for up to 5 days.  In 2017 she was discovered to have a left bundle branch block.  An initial echocardiogram showed an LVEF of 50% and she was started on metoprolol.  A follow-up echocardiogram showed an LVEF of less than 50% was started on losartan.  Had coronary angiograms in 2012 and 2018 [place], [place] and was told that she had normal coronary arteries.      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    In remission    Chemotherapy-induced peripheral neuropathy (HCC) 8/12/2021    Chickenpox     Essential hypertension 1/7/2020    [place] measles     History of B-cell lymphoma 8/12/2021    History of coccidioidomycosis 8/12/2021    coccidiodies Pneumonia in 1998    History of squamous cell carcinoma in situ of skin 11/17/2022 6/2022      LBBB (left bundle branch block) 1/7/2020    LAKIA on CPAP 1/7/2020     Past Surgical History:   Procedure Laterality Date    LUMBAR LAMINECTOMY DISKECTOMY      LYMPH NODE EXCISION      TONSILLECTOMY      TONSILLECTOMY AND ADENOIDECTOMY      TUBAL COAGULATION LAPAROSCOPIC  BILATERAL       Family History   Problem Relation Age of Onset    Hypertension Mother     Lung Disease Father     Cancer Father         lung    Cancer Brother         melanoma    Cancer Brother         liver, testicular    Sleep Apnea Neg Hx     Heart Attack Neg Hx      Social History     Socioeconomic History    Marital status:      Spouse name: Not on file    Number of children: Not on file    Years of education: Not on file    Highest education level: Not on file   Occupational History    Not on file   Tobacco Use    Smoking status: Never    Smokeless tobacco: Never   Vaping Use    Vaping Use: Never used   Substance and Sexual Activity    Alcohol use: Yes     Comment: 1 a week     Drug use: Never    Sexual activity: Yes     Partners: Male   Other Topics Concern    Not on file   Social History Narrative    Not on file     Social Determinants of Health     Financial Resource Strain: Not on file   Food Insecurity: Not on file   Transportation Needs: Not on file   Physical Activity: Not on file   Stress: Not on file   Social Connections: Not on file   Intimate Partner Violence: Not on file   Housing Stability: Not on file     Allergies   Allergen Reactions    Ampicillin      rash    Biaxin [Clarithromycin]      rash    Sulfa Drugs      rash     Outpatient Encounter Medications as of 12/5/2023   Medication Sig Dispense Refill    Multiple Vitamins-Minerals (PRESERVISION AREDS PO) Take  by mouth.      gabapentin (NEURONTIN) 100 MG Cap TAKE 1 CAPSULE BY MOUTH THREE TIMES A  Capsule 3    metoprolol SR (TOPROL XL) 25 MG TABLET SR 24 HR Take 1 Tablet by mouth every day. 90 Tablet 3    losartan (COZAAR) 25 MG Tab TAKE 1 TABLET BY MOUTH EVERY  Tablet 3    atorvastatin (LIPITOR) 20 MG Tab Take 1 Tablet by mouth every evening. 90 Tablet 3    Omega-3 1000 MG Cap Take 1 Capsule by mouth every day.      Multiple Vitamins-Minerals (MULTIVITAMIN ADULT PO) Take  by mouth.      Calcium Carb-Cholecalciferol  "(CALCIUM + D3 PO) Take  by mouth.       No facility-administered encounter medications on file as of 12/5/2023.     Review of Systems   Respiratory:  Negative for cough and shortness of breath.    Cardiovascular:  Negative for chest pain and palpitations.   Musculoskeletal:  Negative for myalgias.   Neurological:  Negative for dizziness and loss of consciousness.              Objective     /60 (BP Location: Left arm, Patient Position: Sitting, BP Cuff Size: Adult)   Pulse 75   Resp 16   Ht 1.727 m (5' 8\")   Wt 85.3 kg (188 lb)   LMP  (LMP Unknown)   SpO2 95%   BMI 28.59 kg/m²     Physical Exam  Vitals reviewed.   Constitutional:       General: She is not in acute distress.     Appearance: She is well-developed.   Neck:      Vascular: No JVD.   Cardiovascular:      Rate and Rhythm: Normal rate and regular rhythm.      Pulses:           Radial pulses are 1+ on the right side and 1+ on the left side.      Heart sounds: Normal heart sounds. No murmur heard.     No friction rub. No gallop.   Pulmonary:      Effort: Pulmonary effort is normal. No accessory muscle usage or respiratory distress.      Breath sounds: Normal breath sounds. No wheezing or rales.   Abdominal:      Comments: Palpable aorta   Musculoskeletal:      Right lower leg: No edema.      Left lower leg: No edema.   Skin:     General: Skin is warm and dry.      Findings: No rash.      Nails: There is no clubbing.   Neurological:      Mental Status: She is alert and oriented to person, place, and time.   Psychiatric:         Behavior: Behavior normal.              Coronary CALCIUM SCAN 11/30/2022  Coronary calcification:  LMA - 0.0  LCX - 0.0  LAD - 86.8  RCA - 21.2  Total Calcium Score: 108      ZioPatch Summary: 7 day monitor beginning 11-17-21.   1.  Sinus rhythm, BBB, with appropriate heart rate range. Average 82, range 56 to 145 bpm.   2.  Normal sinus node and AV node conduction normal. No pauses.   3.  Rare PACs.   4.  Rare PVCs.   5.  12 " runs of SVT, up to 17 beats. No sustained rhythm changes.   6.  4 patient triggers during sinus with PACs and SVT, symptoms reported include skipped, irregular beats.  Conclusion: Sinus, brief runs of SVT. Symptomatic SVT and PACs. No sustained rhythm changes.     ECHOCARDIOGRAM 1/20/2021  Normal left ventricular size, wall thickness, and systolic function.  Normal right ventricular size and systolic function.  Mild aortic insufficiency.  Estimated right ventricular systolic pressure  is 25 mmHg; normal.  Normal pericardium without effusion.  Ascending aorta diameter is 3.9 cm; borderline dilated.   No prior study is available for comparison.    EKG 12/5/2023 sinus rhythm, rate 61, left bundle branch block, personally interpreted    Assessment & Plan     1. Nonischemic cardiomyopathy (HCC)  EKG      2. LBBB (left bundle branch block)        3. Atherosclerosis of native coronary artery of native heart without angina pectoris        4. Mild aortic insufficiency        5. Essential hypertension        6. Dyslipidemia            Medical Decision Making: Today's Assessment/Status/Plan:   NICM with recovered LVEF, presumed related to chemotherapy and LBBB, continue metoprolol, losartan   LBBB, asymptomatic, continue to monitor.  Coronary calcification, asymptomatic with no ischemic symptoms, continue primary ASCVD therapy.  Aortic regurgitation, mild asymptomatic, continue to monitor, echocardiograms as needed  Dyslipidemia, reviewed most recent lipid panel on higher dose of atorvastatin, LDL 47, at goal, on atorvastatin 20 mg daily  Reviewed abdominal ultrasound results which were normal.  RTC 12 months

## 2023-12-20 DIAGNOSIS — E78.2 MIXED HYPERLIPIDEMIA: ICD-10-CM

## 2023-12-20 RX ORDER — ATORVASTATIN CALCIUM 20 MG/1
20 TABLET, FILM COATED ORAL NIGHTLY
Qty: 100 TABLET | Refills: 3 | Status: SHIPPED | OUTPATIENT
Start: 2023-12-20

## 2023-12-20 NOTE — TELEPHONE ENCOUNTER
Is the patient due for a refill? Yes    Was the patient seen the past year? Yes    Date of last office visit: 12/5/2023    Does the patient have an upcoming appointment?  Yes   If yes, When? 12/5/2024    Provider to refill:NATALIA    Does the patients insurance require a 100 day supply?  Yes

## 2023-12-21 ENCOUNTER — HOSPITAL ENCOUNTER (OUTPATIENT)
Dept: RADIOLOGY | Facility: MEDICAL CENTER | Age: 68
End: 2023-12-21
Attending: FAMILY MEDICINE
Payer: MEDICARE

## 2023-12-21 DIAGNOSIS — Z12.31 ENCOUNTER FOR SCREENING MAMMOGRAM FOR BREAST CANCER: ICD-10-CM

## 2023-12-21 PROCEDURE — 77063 BREAST TOMOSYNTHESIS BI: CPT

## 2024-01-04 ENCOUNTER — OFFICE VISIT (OUTPATIENT)
Dept: DERMATOLOGY | Facility: IMAGING CENTER | Age: 69
End: 2024-01-04
Payer: MEDICARE

## 2024-01-04 DIAGNOSIS — D22.9 MULTIPLE NEVI: ICD-10-CM

## 2024-01-04 DIAGNOSIS — L82.1 SK (SEBORRHEIC KERATOSIS): ICD-10-CM

## 2024-01-04 DIAGNOSIS — L85.1 STUCCO KERATOSES: ICD-10-CM

## 2024-01-04 DIAGNOSIS — L82.0 LICHENOID KERATOSIS: ICD-10-CM

## 2024-01-04 DIAGNOSIS — L57.0 ACTINIC KERATOSIS: ICD-10-CM

## 2024-01-04 DIAGNOSIS — L81.4 LENTIGINES: ICD-10-CM

## 2024-01-04 DIAGNOSIS — Z12.83 SKIN CANCER SCREENING: ICD-10-CM

## 2024-01-04 DIAGNOSIS — Z85.828 HISTORY OF SCC (SQUAMOUS CELL CARCINOMA) OF SKIN: ICD-10-CM

## 2024-01-04 DIAGNOSIS — D18.01 CHERRY ANGIOMA: ICD-10-CM

## 2024-01-04 PROCEDURE — 99213 OFFICE O/P EST LOW 20 MIN: CPT | Mod: 25 | Performed by: NURSE PRACTITIONER

## 2024-01-04 PROCEDURE — 17003 DESTRUCT PREMALG LES 2-14: CPT | Mod: 59 | Performed by: NURSE PRACTITIONER

## 2024-01-04 PROCEDURE — 17110 DESTRUCTION B9 LES UP TO 14: CPT | Performed by: NURSE PRACTITIONER

## 2024-01-04 PROCEDURE — 17000 DESTRUCT PREMALG LESION: CPT | Mod: 59 | Performed by: NURSE PRACTITIONER

## 2024-01-04 NOTE — PROGRESS NOTES
DERMATOLOGY NOTE  FOLLOW UP VISIT       Chief complaint: Follow-Up (IGLESIA)    HPI/location: skin lesion behind left ear/ neck   Time present: 2 months   Painful lesion: No  Itching lesion: No  Enlarging lesion: Yes pt states it changes  Anything make it better or worse? No      HPI/location: skin lesion top left ear, pt states it was treated last visit but it came back again   Time present: 6 months   Painful lesion: No  Itching lesion: No  Enlarging lesion: No  Anything make it better or worse? No     HPI/location: skin lesion left eyebrow   Time present: few years   Painful lesion: Yes when pt touches it   Itching lesion: No  Enlarging lesion: No  Anything make it better or worse? No       HPI/location: right lower leg on shin   Time present: a year   Painful lesion: Yes  Itching lesion: No  Enlarging lesion: No  Anything make it better or worse? No       History of skin cancer: Yes 06/2022 SCC Rt Pretibial had Moh's in July  History of precancers/actinic keratoses: Yes, Details: hands, arms, treated with liquid nitrogen and Efudex on forhead and arms  History of biopsies:Yes  History of blistering/severe sunburns:Yes, Details: childhood  Family history of skin cancer:Yes, Details: father SCC, brother melanoma  Family history of atypical moles:No    Allergies   Allergen Reactions    Ampicillin      rash    Biaxin [Clarithromycin]      rash    Sulfa Drugs      rash        MEDICATIONS:  Medications relevant to specialty reviewed.     REVIEW OF SYSTEMS:   Positive for skin (see HPI)  Negative for fevers and chills       EXAM:  LMP  (LMP Unknown)   Constitutional: Well-developed, well-nourished, and in no distress.     A total body skin exam was performed excluding the genitals per patient preference and including the following areas: head (including face), neck, chest, abdomen, groin/buttocks, back, bilateral upper extremities, and bilateral lower extremities with the following pertinent findings listed below and/or in  assessment/plan.    -AK left eyebrow, L post auricular, left helix, right forearm      -sun exposed skin of trunk and b/l upper, lower extremities and face with scattered clinically benign light brown reticulated macules all of which were morphologically similar and none of which were suspicious for skin cancer today on exam    -Several scattered 1-3mm bright red macules and thin papules on the trunk and extremities    -Multiple tan medium brown skin-colored macules papules scattered over the trunk >> extremities, All with benign-appearing pigment network patterns on dermoscopy     -Several tan skin-colored stuck-on waxy papules scattered on the trunk and extremities    -Stucco keratosis bilateral lower extremities,    - lichenoid keratosis on right pretibial         IMPRESSION / PLAN:      1. Actinic keratosis  CRYOTHERAPY:  Risks (including, but not limited to: skin discoloration, redness, blister, blood blister, recurrence, need for further treatment, infection, scar) and benefits of cryotherapy discussed. Patient verbally agreed to proceed with treatment. 1 cryotherapy freeze thaw cycles of 10 seconds were applied to 6 lesions on areas as noted on exam with cryac. Patient tolerated procedure well. Aftercare instructions given--no specific care needed unless irritated during healing process, can apply Vaseline with small band-aid if needed.      2. Lentigines  - Benign-appearing nature of lesions discussed during exam.   - Advised to continue to monitor for any return to clinic for new or concerning changes.      3. Cherry angioma  - Benign-appearing nature of lesions discussed during exam.   - Advised to continue to monitor for any return to clinic for new or concerning changes.      4. Multiple nevi  - Benign-appearing nature of lesions discussed during exam.   - Advised to continue to monitor for any return to clinic for new or concerning changes.      5. SK (seborrheic keratosis)  - Benign-appearing nature of  lesions discussed during exam.   - Advised to continue to monitor for any return to clinic for new or concerning changes.      6. Stucco keratoses  - Benign-appearing nature of lesions discussed during exam.   - Advised to continue to monitor for any return to clinic for new or concerning changes.      7. Lichenoid keratosis  - Benign-appearing nature of lesions discussed during exam.   - courtesy LN2 applied to lesion on R pretibial region, irritating  - Advised to continue to monitor for any return to clinic for new or concerning changes.      8. History of SCC (squamous cell carcinoma) of skin  Q 6 mo TSEs    9. Skin cancer screening  Skin cancer education  discussed importance of sun protective clothing, eyewear in addition to the use of broad spectrum sunscreen with SPF 30 or greater, as well as need for reapplication ~every 2 hours when exposed to UVR/handout given  discussed importance following up for any new or changing lesions as noted in handout given, but every 6 months exams in clinic in the setting of dermatologic history  ABCDE's of melanoma discussed/handout given            Pt understands risks associated with LN2, Patient verbalized understanding and agrees with plan regarding the above    I have performed a physical exam and reviewed and updated ROS and Plan today (1/4/2024). In review of dermatology visit (6/1/2023), there are no changes except as documented above.         Please note that this dictation was created using voice recognition software. I have made every reasonable attempt to correct obvious errors, but I expect that there are errors of grammar and possibly content that I did not discover before finalizing the note.      Return to clinic in: Return in about 6 months (around 7/4/2024) for IGLESIA. and as needed for any new or changing skin lesions.

## 2024-01-09 ENCOUNTER — HOSPITAL ENCOUNTER (OUTPATIENT)
Facility: MEDICAL CENTER | Age: 69
End: 2024-01-09
Attending: FAMILY MEDICINE
Payer: MEDICARE

## 2024-01-09 PROCEDURE — 82274 ASSAY TEST FOR BLOOD FECAL: CPT

## 2024-01-11 DIAGNOSIS — Z12.11 COLON CANCER SCREENING: ICD-10-CM

## 2024-01-11 LAB — AMBIGUOUS SPECIMEN AMBIS: NORMAL

## 2024-01-18 LAB — IMM ASSAY OCC BLD FITOB: NEGATIVE

## 2024-03-07 ENCOUNTER — DOCUMENTATION (OUTPATIENT)
Dept: HEALTH INFORMATION MANAGEMENT | Facility: OTHER | Age: 69
End: 2024-03-07
Payer: MEDICARE

## 2024-05-06 DIAGNOSIS — E78.5 DYSLIPIDEMIA: ICD-10-CM

## 2024-05-06 DIAGNOSIS — Z85.72 HISTORY OF B-CELL LYMPHOMA: ICD-10-CM

## 2024-05-06 DIAGNOSIS — I10 ESSENTIAL HYPERTENSION: ICD-10-CM

## 2024-05-06 DIAGNOSIS — G47.33 OSA ON CPAP: ICD-10-CM

## 2024-05-07 ENCOUNTER — HOSPITAL ENCOUNTER (OUTPATIENT)
Dept: LAB | Facility: MEDICAL CENTER | Age: 69
End: 2024-05-07
Attending: FAMILY MEDICINE
Payer: MEDICARE

## 2024-05-07 DIAGNOSIS — E78.5 DYSLIPIDEMIA: ICD-10-CM

## 2024-05-07 DIAGNOSIS — Z85.72 HISTORY OF B-CELL LYMPHOMA: ICD-10-CM

## 2024-05-07 DIAGNOSIS — G47.33 OSA ON CPAP: ICD-10-CM

## 2024-05-07 DIAGNOSIS — I10 ESSENTIAL HYPERTENSION: ICD-10-CM

## 2024-05-07 LAB
ALBUMIN SERPL BCP-MCNC: 4.2 G/DL (ref 3.2–4.9)
ALBUMIN/GLOB SERPL: 1.6 G/DL
ALP SERPL-CCNC: 85 U/L (ref 30–99)
ALT SERPL-CCNC: 28 U/L (ref 2–50)
ANION GAP SERPL CALC-SCNC: 12 MMOL/L (ref 7–16)
AST SERPL-CCNC: 22 U/L (ref 12–45)
BILIRUB SERPL-MCNC: 0.7 MG/DL (ref 0.1–1.5)
BUN SERPL-MCNC: 19 MG/DL (ref 8–22)
CALCIUM ALBUM COR SERPL-MCNC: 9.5 MG/DL (ref 8.5–10.5)
CALCIUM SERPL-MCNC: 9.7 MG/DL (ref 8.5–10.5)
CHLORIDE SERPL-SCNC: 105 MMOL/L (ref 96–112)
CHOLEST SERPL-MCNC: 146 MG/DL (ref 100–199)
CO2 SERPL-SCNC: 26 MMOL/L (ref 20–33)
CREAT SERPL-MCNC: 0.8 MG/DL (ref 0.5–1.4)
ERYTHROCYTE [DISTWIDTH] IN BLOOD BY AUTOMATED COUNT: 43.9 FL (ref 35.9–50)
FASTING STATUS PATIENT QL REPORTED: NORMAL
GFR SERPLBLD CREATININE-BSD FMLA CKD-EPI: 80 ML/MIN/1.73 M 2
GLOBULIN SER CALC-MCNC: 2.6 G/DL (ref 1.9–3.5)
GLUCOSE SERPL-MCNC: 93 MG/DL (ref 65–99)
HCT VFR BLD AUTO: 42.4 % (ref 37–47)
HDLC SERPL-MCNC: 66 MG/DL
HGB BLD-MCNC: 14.1 G/DL (ref 12–16)
LDH SERPL L TO P-CCNC: 188 U/L (ref 107–266)
LDLC SERPL CALC-MCNC: 65 MG/DL
MCH RBC QN AUTO: 30.2 PG (ref 27–33)
MCHC RBC AUTO-ENTMCNC: 33.3 G/DL (ref 32.2–35.5)
MCV RBC AUTO: 90.8 FL (ref 81.4–97.8)
PLATELET # BLD AUTO: 181 K/UL (ref 164–446)
PMV BLD AUTO: 10.1 FL (ref 9–12.9)
POTASSIUM SERPL-SCNC: 4 MMOL/L (ref 3.6–5.5)
PROT SERPL-MCNC: 6.8 G/DL (ref 6–8.2)
RBC # BLD AUTO: 4.67 M/UL (ref 4.2–5.4)
SODIUM SERPL-SCNC: 143 MMOL/L (ref 135–145)
TRIGL SERPL-MCNC: 75 MG/DL (ref 0–149)
TSH SERPL DL<=0.005 MIU/L-ACNC: 5.88 UIU/ML (ref 0.38–5.33)
WBC # BLD AUTO: 5.4 K/UL (ref 4.8–10.8)

## 2024-05-09 ENCOUNTER — APPOINTMENT (OUTPATIENT)
Dept: MEDICAL GROUP | Facility: MEDICAL CENTER | Age: 69
End: 2024-05-09
Payer: MEDICARE

## 2024-05-09 VITALS
SYSTOLIC BLOOD PRESSURE: 112 MMHG | DIASTOLIC BLOOD PRESSURE: 62 MMHG | TEMPERATURE: 98 F | HEIGHT: 68 IN | WEIGHT: 188 LBS | BODY MASS INDEX: 28.49 KG/M2 | HEART RATE: 66 BPM | OXYGEN SATURATION: 97 % | RESPIRATION RATE: 14 BRPM

## 2024-05-09 DIAGNOSIS — I25.10 ATHEROSCLEROSIS OF NATIVE CORONARY ARTERY OF NATIVE HEART WITHOUT ANGINA PECTORIS: ICD-10-CM

## 2024-05-09 DIAGNOSIS — T45.1X5A CHEMOTHERAPY-INDUCED PERIPHERAL NEUROPATHY (HCC): ICD-10-CM

## 2024-05-09 DIAGNOSIS — Z85.72 HISTORY OF B-CELL LYMPHOMA: ICD-10-CM

## 2024-05-09 DIAGNOSIS — I10 ESSENTIAL HYPERTENSION: ICD-10-CM

## 2024-05-09 DIAGNOSIS — G62.0 CHEMOTHERAPY-INDUCED PERIPHERAL NEUROPATHY (HCC): ICD-10-CM

## 2024-05-09 DIAGNOSIS — G47.33 OSA ON CPAP: ICD-10-CM

## 2024-05-09 DIAGNOSIS — I42.8 NONISCHEMIC CARDIOMYOPATHY (HCC): ICD-10-CM

## 2024-05-09 DIAGNOSIS — I77.89 AORTIC ROOT ENLARGEMENT (HCC): ICD-10-CM

## 2024-05-09 DIAGNOSIS — R79.89 ELEVATED TSH: ICD-10-CM

## 2024-05-09 PROCEDURE — 3078F DIAST BP <80 MM HG: CPT | Performed by: FAMILY MEDICINE

## 2024-05-09 PROCEDURE — 99214 OFFICE O/P EST MOD 30 MIN: CPT | Performed by: FAMILY MEDICINE

## 2024-05-09 PROCEDURE — 3074F SYST BP LT 130 MM HG: CPT | Performed by: FAMILY MEDICINE

## 2024-05-09 ASSESSMENT — PATIENT HEALTH QUESTIONNAIRE - PHQ9: CLINICAL INTERPRETATION OF PHQ2 SCORE: 0

## 2024-05-09 ASSESSMENT — FIBROSIS 4 INDEX: FIB4 SCORE: 1.56

## 2024-05-09 NOTE — PROGRESS NOTES
Chief Complaint   Patient presents with    Follow-Up     Q6M      Nerve Pain    Lab Results       Subjective:     HPI:   Karla Villeda presents today with the followin. Elevated TSH  Patient has mildly elevated TSH but denies any increased fatigue.  Denies noting any other changes.  Patient is not on thyroid supplementation.  Advised rechecking in the future.  Had initially suggested rechecking in 6 months but patient is clinically very well.  So we will plan on rechecking at 1 year.  If she begins to feel more fatigued or something feels off to her before that time we will recheck and she will let me know through the medical email.    2. Essential hypertension  HTN - Chronic condition stable. Currently taking all meds as directed.   She is not taking baby aspirin daily.   She  is occasionally  monitoring BP at home. Her pressures are very good.  Excellent here today.  Denies symptoms low BP: light-headed, tunnel-vision, unusual fatigue.   Denies symptoms high BP:pounding headache, visual changes, palpitations, flushed face.   Denies medicine side effects: unusual fatigue, slow heartbeat, foot/leg swelling, cough.    3. Aortic root enlargement (HCC)  Patient last echocardiogram in 2021 showed mild or borderline aortic root enlargement of 3.9 cm.  Denies any chest pain.  Some coronary calcification in LAD and right coronary artery.  She will continue yearly follow-up with her cardiologist.  Possibly echocardiogram next fall.      4. Chemotherapy-induced peripheral neuropathy (HCC)/History of B-cell lymphoma  The gabapentin helps at night.  Does not take during the day.  Takes at night.  Helps.  She has adjusted to the medication.    5. Atherosclerosis of native coronary artery of native heart without angina pectoris  Patient does have positive coronary calcium score of 108.  She is taking atorvastatin which she tolerates well.  Patient's blood pressure is in very good control.  She is not  on low-dose aspirin.  She is a never smoker.    6. Nonischemic cardiomyopathy (HCC)  Was chemotherapy induced.  Has recovered well.  Follows with cardiology.    7. LAKIA on CPAP  Patient does use her CPAP on a regular basis.  She does occasionally fall asleep without it unintentionally.  Overall she feels well on this treatment.  Follow up referral placed        Patient Active Problem List    Diagnosis Date Noted    Elevated TSH 05/09/2024    Dyslipidemia 05/31/2023    Nonischemic cardiomyopathy (HCC) 05/31/2023    Mild aortic insufficiency 05/17/2023    Aortic root enlargement (HCC) 05/17/2023    Atherosclerosis of native coronary artery of native heart without angina pectoris 05/17/2023    Uterine prolapse 05/17/2023    History of severe sun exposure 05/17/2023    History of squamous cell carcinoma in situ of skin 11/17/2022    History of B-cell lymphoma 08/12/2021    Chemotherapy-induced peripheral neuropathy (HCC) 08/12/2021    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 100 Tablet 3    Multiple Vitamins-Minerals (PRESERVISION AREDS PO) Take  by mouth.      gabapentin (NEURONTIN) 100 MG Cap TAKE 1 CAPSULE BY MOUTH THREE TIMES A  Capsule 3    metoprolol SR (TOPROL XL) 25 MG TABLET SR 24 HR Take 1 Tablet by mouth every day. 90 Tablet 3    losartan (COZAAR) 25 MG Tab TAKE 1 TABLET BY MOUTH EVERY  Tablet 3    Omega-3 1000 MG Cap Take 1 Capsule by mouth every day.      Multiple Vitamins-Minerals (MULTIVITAMIN ADULT PO) Take  by mouth.      Calcium Carb-Cholecalciferol (CALCIUM + D3 PO) Take  by mouth.       No current facility-administered medications for this visit.       Allergies  "  Allergen Reactions    Ampicillin      rash    Biaxin [Clarithromycin]      rash    Sulfa Drugs      rash       ROS: As per HPI       Objective:     /62   Pulse 66   Temp 36.7 °C (98 °F)   Resp 14   Ht 1.727 m (5' 8\")   Wt 85.3 kg (188 lb)   SpO2 97%  Body mass index is 28.59 kg/m².    Physical Exam:  Constitutional: Well-developed and well-nourished. Not diaphoretic. No distress. Lucid and fluent.  Skin: Skin is warm and dry. No rash noted.  Head: Atraumatic without lesions.  Eyes: Conjunctivae and extraocular motions are normal. Pupils are equal, round, and reactive to light. No scleral icterus.   Ears:  External ears unremarkable. Tympanic membranes clear and intact.  Nose: Nares patent. Mucosa without edema or erythema. No discharge. No facial tenderness.  Mouth/Throat: Tongue normal. Oropharynx is clear and moist. Posterior pharynx without erythema or exudates.  Neck: Supple, trachea midline. No thyromegaly present. No cervical or supraclavicular lymphadenopathy. No JVD or carotid bruits appreciated  Cardiovascular: Regular rate and rhythm.  Normal S1, S2 without murmur appreciated.  Chest: Effort normal. Clear to auscultation throughout. No adventitious sounds.   Abdomen: Soft, non tender, and without distention. Active bowel sounds in all four quadrants. No rebound, guarding, masses or hepatosplenomegaly.  Extremities: No cyanosis, clubbing, erythema, nor edema.   Neurological: Alert and oriented x 3.  No tremor appreciated.  Psychiatric:  Behavior, mood, and affect are appropriate.    Lab results from May 7 reviewed and discussed in detail.  LDH remains at a good level.     Assessment and Plan:     69 y.o. female with the following issues:    1. Elevated TSH        2. Essential hypertension        3. Aortic root enlargement (HCC)        4. Chemotherapy-induced peripheral neuropathy (HCC)        5. Atherosclerosis of native coronary artery of native heart without angina pectoris        6. History " of B-cell lymphoma        7. Nonischemic cardiomyopathy (HCC)        8. LAKIA on CPAP  Referral to Pulmonary and Sleep Medicine            Followup: Return in about 6 months (around 11/9/2024), or if symptoms worsen or fail to improve.

## 2024-06-24 ENCOUNTER — TELEPHONE (OUTPATIENT)
Dept: HEALTH INFORMATION MANAGEMENT | Facility: OTHER | Age: 69
End: 2024-06-24
Payer: MEDICARE

## 2024-07-10 ENCOUNTER — OFFICE VISIT (OUTPATIENT)
Dept: DERMATOLOGY | Facility: IMAGING CENTER | Age: 69
End: 2024-07-10
Payer: MEDICARE

## 2024-07-10 DIAGNOSIS — Z12.83 SKIN CANCER SCREENING: ICD-10-CM

## 2024-07-10 DIAGNOSIS — L57.0 ACTINIC KERATOSIS: ICD-10-CM

## 2024-07-10 DIAGNOSIS — L82.1 SK (SEBORRHEIC KERATOSIS): ICD-10-CM

## 2024-07-10 DIAGNOSIS — L81.4 LENTIGINES: ICD-10-CM

## 2024-07-10 DIAGNOSIS — L82.1 STUCCO KERATOSES: ICD-10-CM

## 2024-07-10 DIAGNOSIS — D22.9 MULTIPLE NEVI: ICD-10-CM

## 2024-07-10 DIAGNOSIS — D18.01 CHERRY ANGIOMA: ICD-10-CM

## 2024-07-10 PROCEDURE — 17003 DESTRUCT PREMALG LES 2-14: CPT | Performed by: NURSE PRACTITIONER

## 2024-07-10 PROCEDURE — 99213 OFFICE O/P EST LOW 20 MIN: CPT | Mod: 25 | Performed by: NURSE PRACTITIONER

## 2024-07-10 PROCEDURE — 17000 DESTRUCT PREMALG LESION: CPT | Performed by: NURSE PRACTITIONER

## 2024-08-23 ENCOUNTER — OFFICE VISIT (OUTPATIENT)
Dept: SLEEP MEDICINE | Facility: MEDICAL CENTER | Age: 69
End: 2024-08-23
Attending: NURSE PRACTITIONER
Payer: MEDICARE

## 2024-08-23 VITALS
SYSTOLIC BLOOD PRESSURE: 122 MMHG | WEIGHT: 189 LBS | HEART RATE: 79 BPM | OXYGEN SATURATION: 96 % | HEIGHT: 68 IN | BODY MASS INDEX: 28.64 KG/M2 | RESPIRATION RATE: 16 BRPM | DIASTOLIC BLOOD PRESSURE: 82 MMHG

## 2024-08-23 DIAGNOSIS — G47.33 OSA ON CPAP: ICD-10-CM

## 2024-08-23 PROCEDURE — 3079F DIAST BP 80-89 MM HG: CPT | Performed by: NURSE PRACTITIONER

## 2024-08-23 PROCEDURE — 99213 OFFICE O/P EST LOW 20 MIN: CPT | Performed by: NURSE PRACTITIONER

## 2024-08-23 PROCEDURE — 99214 OFFICE O/P EST MOD 30 MIN: CPT | Performed by: NURSE PRACTITIONER

## 2024-08-23 PROCEDURE — 3074F SYST BP LT 130 MM HG: CPT | Performed by: NURSE PRACTITIONER

## 2024-08-23 ASSESSMENT — FIBROSIS 4 INDEX: FIB4 SCORE: 1.56

## 2024-08-23 NOTE — PROGRESS NOTES
Chief Complaint   Patient presents with    Follow-Up     Last Office Visit 6/2/2023 with Romero Ronal BAPTISTE   Cpap        HPI:  Karla Villeda is a 69 y.o. year old female here today for follow-up on LAKIA.   Completed a sleep study on 8/27/2020 which showed evidence of moderate LAKIA with an VICKI of 14/h with a O2 conchita of 80%. She was  was diagnosed with LAKIA about 14 years ago in [facility] and has been on CPAP since then. She has moved to [place] and re-established care.The initial presenting symptoms were snoring, witnessed apneas.     Patient is currently on a Joselyn Respironics CPAP that was registered with the recall program. Currently using a Simplus fullface mask and occasionally leaks when laying flat on her back.  Overall she notes improved sleep quality using CPAP.  She also uses heated tubing.  She gets an average of 7 hours of total sleep and will use a CPAP for 5 to 6 hours nightly.  She denies any difficulty falling or staying asleep.  Overall she notes improvement in sleep quality using CPAP and denies any excessive daytime sleepiness, morning headaches, palpitations, concentration or memory problems.    30-day compliance reviewed shows 97% use with an average time of 5 hours and 46 minutes and AHI of 4.0 with no evidence of persistent mask leak.    ROS: As per HPI and otherwise negative if not stated.    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    coccidiodies Pneumonia in 1998    History of squamous cell carcinoma in situ of skin 11/17/2022 6/2022      LBBB (left bundle branch block) 01/07/2020    LAKIA on CPAP 01/07/2020       Past Surgical History:   Procedure Laterality  "Date    LUMBAR LAMINECTOMY DISKECTOMY      LYMPH NODE EXCISION      TONSILLECTOMY      TONSILLECTOMY AND ADENOIDECTOMY      TUBAL COAGULATION LAPAROSCOPIC BILATERAL         Family History   Problem Relation Age of Onset    Hypertension Mother     Lung Disease Father     Cancer Father         lung    Cancer Brother         melanoma    Cancer Brother         liver, testicular    Sleep Apnea Neg Hx     Heart Attack Neg Hx        Allergies as of 08/23/2024 - Reviewed 08/23/2024   Allergen Reaction Noted    Ampicillin  01/07/2020    Biaxin [clarithromycin]  01/07/2020    Sulfa drugs  01/07/2020        Vitals:  /82 (BP Location: Left arm, Patient Position: Sitting, BP Cuff Size: Adult)   Pulse 79   Resp 16   Ht 1.727 m (5' 8\")   Wt 85.7 kg (189 lb)   SpO2 96%     Current medications as of today   Current Outpatient Medications   Medication Sig Dispense Refill    atorvastatin (LIPITOR) 20 MG Tab Take 1 Tablet by mouth every evening. 100 Tablet 3    Multiple Vitamins-Minerals (PRESERVISION AREDS PO) Take  by mouth.      gabapentin (NEURONTIN) 100 MG Cap TAKE 1 CAPSULE BY MOUTH THREE TIMES A  Capsule 3    metoprolol SR (TOPROL XL) 25 MG TABLET SR 24 HR Take 1 Tablet by mouth every day. 90 Tablet 3    losartan (COZAAR) 25 MG Tab TAKE 1 TABLET BY MOUTH EVERY  Tablet 3    Omega-3 1000 MG Cap Take 1 Capsule by mouth every day.      Multiple Vitamins-Minerals (MULTIVITAMIN ADULT PO) Take  by mouth.      Calcium Carb-Cholecalciferol (CALCIUM + D3 PO) Take  by mouth.       No current facility-administered medications for this visit.         Physical Exam:   Gen:           Alert and oriented, No apparent distress. Mood and affect appropriate, normal interaction with examiner.  Eyes:          PERRL, EOM intact, sclere white, conjunctive moist.  Ears:          Not examined.   Hearing:     Grossly intact.  Nose:          Normal, no lesions or deformities.  Dentition:    Good dentition.  Oropharynx:   Tongue " normal, posterior pharynx without erythema or exudate.  Neck:        Supple, trachea midline, no masses.  Respiratory Effort: No intercostal retractions or use of accessory muscles.   Lung Auscultation:      Clear to auscultation bilaterally; no rales, rhonchi or wheezing.  CV:            Regular rate and rhythm. No murmurs, rubs or gallops.  Abd:           Not examined.   Lymphadenopathy: Not examined.  Gait and Station: Normal.  Digits and Nails: No clubbing, cyanosis, petechiae, or nodes.   Cranial Nerves: II-XII grossly intact.  Skin:        No rashes, lesions or ulcers noted.               Ext:           No cyanosis or edema.      Assessment:  1. LAKIA on CPAP  DME Mask and Supplies        Plan:  Patient using and benefiting from CPAP therapy.  Is eligible for device, but chooses to continue on current device.  Order placed for mask and supplies which will be good for 1 year.  Advise annual follow-up, but can be seen sooner if needed.    Please note that this dictation was created using voice recognition software. I have made every reasonable attempt to correct obvious errors, but it is possible there are errors of grammar and possibly content that I did not discover before finalizing the note.

## 2024-09-29 DIAGNOSIS — I10 ESSENTIAL HYPERTENSION: ICD-10-CM

## 2024-09-29 RX ORDER — LOSARTAN POTASSIUM 25 MG/1
25 TABLET ORAL
Qty: 100 TABLET | Refills: 3 | Status: SHIPPED | OUTPATIENT
Start: 2024-09-29

## 2024-10-14 ENCOUNTER — OFFICE VISIT (OUTPATIENT)
Dept: URGENT CARE | Facility: CLINIC | Age: 69
End: 2024-10-14
Payer: MEDICARE

## 2024-10-14 VITALS
DIASTOLIC BLOOD PRESSURE: 70 MMHG | SYSTOLIC BLOOD PRESSURE: 120 MMHG | WEIGHT: 190 LBS | RESPIRATION RATE: 18 BRPM | OXYGEN SATURATION: 97 % | TEMPERATURE: 97.4 F | BODY MASS INDEX: 28.79 KG/M2 | HEART RATE: 63 BPM | HEIGHT: 68 IN

## 2024-10-14 DIAGNOSIS — R35.0 URINARY FREQUENCY: ICD-10-CM

## 2024-10-14 LAB
APPEARANCE UR: CLEAR
BILIRUB UR STRIP-MCNC: NEGATIVE MG/DL
COLOR UR AUTO: YELLOW
GLUCOSE UR STRIP.AUTO-MCNC: NEGATIVE MG/DL
KETONES UR STRIP.AUTO-MCNC: NEGATIVE MG/DL
LEUKOCYTE ESTERASE UR QL STRIP.AUTO: NEGATIVE
NITRITE UR QL STRIP.AUTO: NEGATIVE
PH UR STRIP.AUTO: 7 [PH] (ref 5–8)
PROT UR QL STRIP: NEGATIVE MG/DL
RBC UR QL AUTO: NEGATIVE
SP GR UR STRIP.AUTO: 1.02
UROBILINOGEN UR STRIP-MCNC: 0.2 MG/DL

## 2024-10-14 PROCEDURE — 3078F DIAST BP <80 MM HG: CPT | Performed by: PHYSICIAN ASSISTANT

## 2024-10-14 PROCEDURE — 3074F SYST BP LT 130 MM HG: CPT | Performed by: PHYSICIAN ASSISTANT

## 2024-10-14 PROCEDURE — 99213 OFFICE O/P EST LOW 20 MIN: CPT | Performed by: PHYSICIAN ASSISTANT

## 2024-10-14 PROCEDURE — 81002 URINALYSIS NONAUTO W/O SCOPE: CPT | Performed by: PHYSICIAN ASSISTANT

## 2024-10-14 ASSESSMENT — ENCOUNTER SYMPTOMS
FEVER: 0
VOMITING: 0
NAUSEA: 0
ABDOMINAL PAIN: 0
CHILLS: 0
MYALGIAS: 0
DIZZINESS: 0
FLANK PAIN: 0
HEADACHES: 0

## 2024-10-14 ASSESSMENT — FIBROSIS 4 INDEX: FIB4 SCORE: 1.56

## 2024-11-11 ENCOUNTER — OFFICE VISIT (OUTPATIENT)
Dept: DERMATOLOGY | Facility: IMAGING CENTER | Age: 69
End: 2024-11-11
Payer: MEDICARE

## 2024-11-11 DIAGNOSIS — L57.0 ACTINIC KERATOSIS: ICD-10-CM

## 2024-11-11 PROCEDURE — 96573 PDT DSTR PRMLG LES PHYS/QHP: CPT | Performed by: NURSE PRACTITIONER

## 2024-11-11 PROCEDURE — 99999 PR NO CHARGE: CPT | Performed by: NURSE PRACTITIONER

## 2024-11-11 NOTE — PROGRESS NOTES
S: Patient here for field treatment of actinic keratosis on forearms  PDT has previously been discussed in detail (vs. Cryo vs topical at home field therapy)     O: bilateral forearms with scattered gritty/erythematous papules     A/P: Actinic keratoses  I counseled the patient regarding the following:  The risks of PDT, including, but not limited to, pigmentary changes, pain, blistering, scabbing, redness, remote possibility of scarring, failed treatment, were discussed.   Post care discussed, including the absolute need to avoid sunlight for 2 days (48 hours) post-procedure, and the need to wear sun protection. Patient may experience sunburn like redness, discomfort, swelling, and scabbing. Must wear zinc oxide sunscreen at all times during the week of healing.    Patient aware to call me with any questions or concerns.  See ALA + blue light treatment was administered per treatment protocol scanned into patient chart (see attached).     RTC 3 months for post-procedure check     EMILE Gaytan, BETYP-C

## 2024-11-14 ENCOUNTER — OFFICE VISIT (OUTPATIENT)
Dept: MEDICAL GROUP | Facility: MEDICAL CENTER | Age: 69
End: 2024-11-14
Payer: MEDICARE

## 2024-11-14 VITALS
RESPIRATION RATE: 14 BRPM | HEIGHT: 68 IN | BODY MASS INDEX: 28.04 KG/M2 | TEMPERATURE: 98 F | DIASTOLIC BLOOD PRESSURE: 52 MMHG | SYSTOLIC BLOOD PRESSURE: 106 MMHG | HEART RATE: 81 BPM | WEIGHT: 185 LBS | OXYGEN SATURATION: 95 %

## 2024-11-14 DIAGNOSIS — M25.561 BILATERAL CHRONIC KNEE PAIN: ICD-10-CM

## 2024-11-14 DIAGNOSIS — Z12.31 ENCOUNTER FOR SCREENING MAMMOGRAM FOR BREAST CANCER: ICD-10-CM

## 2024-11-14 DIAGNOSIS — G62.9 NEUROPATHY: ICD-10-CM

## 2024-11-14 DIAGNOSIS — M25.562 BILATERAL CHRONIC KNEE PAIN: ICD-10-CM

## 2024-11-14 DIAGNOSIS — M17.0 PRIMARY OSTEOARTHRITIS OF KNEES, BILATERAL: ICD-10-CM

## 2024-11-14 DIAGNOSIS — I44.7 LBBB (LEFT BUNDLE BRANCH BLOCK): ICD-10-CM

## 2024-11-14 DIAGNOSIS — G62.0 CHEMOTHERAPY-INDUCED PERIPHERAL NEUROPATHY (HCC): ICD-10-CM

## 2024-11-14 DIAGNOSIS — G89.29 BILATERAL CHRONIC KNEE PAIN: ICD-10-CM

## 2024-11-14 DIAGNOSIS — I10 ESSENTIAL HYPERTENSION: ICD-10-CM

## 2024-11-14 DIAGNOSIS — G47.33 OSA ON CPAP: ICD-10-CM

## 2024-11-14 DIAGNOSIS — T45.1X5A CHEMOTHERAPY-INDUCED PERIPHERAL NEUROPATHY (HCC): ICD-10-CM

## 2024-11-14 DIAGNOSIS — E78.5 DYSLIPIDEMIA: ICD-10-CM

## 2024-11-14 DIAGNOSIS — Z85.72 HISTORY OF B-CELL LYMPHOMA: ICD-10-CM

## 2024-11-14 PROCEDURE — 99214 OFFICE O/P EST MOD 30 MIN: CPT | Performed by: FAMILY MEDICINE

## 2024-11-14 PROCEDURE — 3074F SYST BP LT 130 MM HG: CPT | Performed by: FAMILY MEDICINE

## 2024-11-14 PROCEDURE — 3078F DIAST BP <80 MM HG: CPT | Performed by: FAMILY MEDICINE

## 2024-11-14 RX ORDER — GABAPENTIN 100 MG/1
CAPSULE ORAL
Qty: 300 CAPSULE | Refills: 3 | Status: SHIPPED | OUTPATIENT
Start: 2024-11-14

## 2024-11-14 RX ORDER — METOPROLOL SUCCINATE 25 MG/1
25 TABLET, EXTENDED RELEASE ORAL DAILY
Qty: 100 TABLET | Refills: 3 | Status: SHIPPED | OUTPATIENT
Start: 2024-11-14

## 2024-11-14 ASSESSMENT — FIBROSIS 4 INDEX: FIB4 SCORE: 1.56

## 2024-11-14 NOTE — PROGRESS NOTES
Chief Complaint   Patient presents with    Knee Pain    Dyslipidemia    Hypertension    Nerve Pain       Subjective:     HPI:   Karla Villeda presents today with the followin. Bilateral chronic knee pain/Primary osteoarthritis of knees, bilateral  Patient notes she has difficulty crouching or squatting due to tightness in her knees.  This has been going on for quite a while but has worsened over the last 6 months.  Patient has been told in the past she had osteoarthritis of the knees and had a past knee MRI.  It has been several years.  She was told she would likely need knee replacements at 1 time.  Will shoot x-ray orders discussed and placed.    2. Dyslipidemia  Patient denies chest pain, chest pressure, palpitations or exertional shortness of breath. Patient denies muscle aches or muscle weakness from the atorvastatin medication. Patient is a never smoker. Patient takes no aspirin daily. Patient has no history of myocardial infarction, stroke or PVD.  Follow-up lab order discussed and placed.    3. Neuropathy/Chemotherapy-induced peripheral neuropathy (HCC)  Patient has chronic post chemo neuropathy.  The gabapentin continues to be helpful.  Renewal is sent.    4. Essential hypertension  HTN - Chronic condition stable. Currently taking all meds as directed.   She is not taking baby aspirin daily.   She is not monitoring BP at home. BP excellent today.  Denies symptoms low BP: light-headed, tunnel-vision, unusual fatigue.   Denies symptoms high BP:pounding headache, visual changes, palpitations, flushed face.   Denies medicine side effects: unusual fatigue, slow heartbeat, foot/leg swelling, cough.    5. LBBB (left bundle branch block)  Patient does have a left bundle branch block.  She is on metoprolol.  Asymptomatic.    6. LAKIA on CPAP  Using her CPAP regularly.  She follows with pulmonology.    7. History of B-cell lymphoma  LDH marker order discussed and placed.  She has been quite  stable.    8. Encounter for screening mammogram for breast cancer  Mammogram order discussed and placed        Patient Active Problem List    Diagnosis Date Noted    Bilateral chronic knee pain 11/14/2024    Primary osteoarthritis of knees, bilateral 11/14/2024    Elevated TSH 05/09/2024    Dyslipidemia 05/31/2023    Nonischemic cardiomyopathy (HCC) 05/31/2023    Mild aortic insufficiency 05/17/2023    Aortic root enlargement (HCC) 05/17/2023    Atherosclerosis of native coronary artery of native heart without angina pectoris 05/17/2023    Uterine prolapse 05/17/2023    History of severe sun exposure 05/17/2023    History of squamous cell carcinoma in situ of skin 11/17/2022    History of B-cell lymphoma 08/12/2021    Chemotherapy-induced peripheral neuropathy (HCC) 08/12/2021    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 100 Tablet 3    losartan (COZAAR) 25 MG Tab TAKE 1 TABLET BY MOUTH EVERY  Tablet 3    atorvastatin (LIPITOR) 20 MG Tab Take 1 Tablet by mouth every evening. 100 Tablet 3    Multiple Vitamins-Minerals (PRESERVISION AREDS PO) Take  by mouth.      Omega-3 1000 MG Cap Take 1 Capsule by mouth every day.      Multiple Vitamins-Minerals (MULTIVITAMIN ADULT PO) Take  by mouth.      Calcium Carb-Cholecalciferol (CALCIUM + D3 PO) Take  by mouth.       No current facility-administered medications for this visit.       Allergies   Allergen Reactions    Ampicillin      rash    Biaxin [Clarithromycin]      rash    Sulfa Drugs      rash       ROS: As per HPI       Objective:     /52   " Pulse 81   Temp 36.7 °C (98 °F)   Resp 14   Ht 1.727 m (5' 8\")   Wt 83.9 kg (185 lb)   SpO2 95%  Body mass index is 28.13 kg/m².    Physical Exam:  Constitutional: Well-developed and well-nourished. Not diaphoretic. No distress. Lucid and fluent.  Skin: Skin is warm and dry. No rash noted.  Head: Atraumatic without lesions.  Eyes: Conjunctivae and extraocular motions are normal. Pupils are equal, round, and reactive to light. No scleral icterus.   Ears:  External ears unremarkable.   Neck: Supple, trachea midline. No thyromegaly present. No cervical or supraclavicular lymphadenopathy. No JVD or carotid bruits appreciated  Cardiovascular: Regular rate and rhythm.  Normal S1, S2 without murmur appreciated.  Chest: Effort normal. Clear to auscultation throughout. No adventitious sounds.   Abdomen: Soft, non tender, and without distention. Active bowel sounds in all four quadrants. No rebound, guarding, masses or hepatosplenomegaly.  Extremities: No cyanosis, clubbing, erythema, nor edema.   Neurological: Alert and oriented x 3. No tremor appreciated.  Psychiatric:  Behavior, mood, and affect are appropriate.       Assessment and Plan:     69 y.o. female with the following issues:    1. Bilateral chronic knee pain  DX-KNEE COMPLETE 4+ LEFT    DX-KNEE COMPLETE 4+ RIGHT      2. Primary osteoarthritis of knees, bilateral  DX-KNEE COMPLETE 4+ LEFT    DX-KNEE COMPLETE 4+ RIGHT      3. Dyslipidemia  Comp Metabolic Panel    CBC WITHOUT DIFFERENTIAL    TSH    Lipid Profile      4. Neuropathy  TSH    gabapentin (NEURONTIN) 100 MG Cap      5. Chemotherapy-induced peripheral neuropathy (HCC)  gabapentin (NEURONTIN) 100 MG Cap      6. Essential hypertension  Comp Metabolic Panel    CBC WITHOUT DIFFERENTIAL    Lipid Profile    metoprolol SR (TOPROL XL) 25 MG TABLET SR 24 HR      7. LBBB (left bundle branch block)  metoprolol SR (TOPROL XL) 25 MG TABLET SR 24 HR      8. LAKIA on CPAP        9. History of B-cell lymphoma  LDH    "   10. Encounter for screening mammogram for breast cancer  MA-SCREENING MAMMO BILAT W/TOMOSYNTHESIS W/CAD        Discussed RSV vaccine, will get done    Followup: Return in about 6 months (around 5/14/2025), or if symptoms worsen or fail to improve.

## 2024-11-19 ENCOUNTER — HOSPITAL ENCOUNTER (OUTPATIENT)
Dept: LAB | Facility: MEDICAL CENTER | Age: 69
End: 2024-11-19
Attending: FAMILY MEDICINE
Payer: MEDICARE

## 2024-11-19 DIAGNOSIS — E78.5 DYSLIPIDEMIA: ICD-10-CM

## 2024-11-19 DIAGNOSIS — I10 ESSENTIAL HYPERTENSION: ICD-10-CM

## 2024-11-19 DIAGNOSIS — Z85.72 HISTORY OF B-CELL LYMPHOMA: ICD-10-CM

## 2024-11-19 DIAGNOSIS — G62.9 NEUROPATHY: ICD-10-CM

## 2024-11-19 LAB
ALBUMIN SERPL BCP-MCNC: 4.3 G/DL (ref 3.2–4.9)
ALBUMIN/GLOB SERPL: 1.6 G/DL
ALP SERPL-CCNC: 86 U/L (ref 30–99)
ALT SERPL-CCNC: 22 U/L (ref 2–50)
ANION GAP SERPL CALC-SCNC: 8 MMOL/L (ref 7–16)
AST SERPL-CCNC: 20 U/L (ref 12–45)
BILIRUB SERPL-MCNC: 0.8 MG/DL (ref 0.1–1.5)
BUN SERPL-MCNC: 19 MG/DL (ref 8–22)
CALCIUM ALBUM COR SERPL-MCNC: 9.2 MG/DL (ref 8.5–10.5)
CALCIUM SERPL-MCNC: 9.4 MG/DL (ref 8.5–10.5)
CHLORIDE SERPL-SCNC: 104 MMOL/L (ref 96–112)
CHOLEST SERPL-MCNC: 150 MG/DL (ref 100–199)
CO2 SERPL-SCNC: 27 MMOL/L (ref 20–33)
CREAT SERPL-MCNC: 0.79 MG/DL (ref 0.5–1.4)
ERYTHROCYTE [DISTWIDTH] IN BLOOD BY AUTOMATED COUNT: 43.8 FL (ref 35.9–50)
GFR SERPLBLD CREATININE-BSD FMLA CKD-EPI: 81 ML/MIN/1.73 M 2
GLOBULIN SER CALC-MCNC: 2.7 G/DL (ref 1.9–3.5)
GLUCOSE SERPL-MCNC: 96 MG/DL (ref 65–99)
HCT VFR BLD AUTO: 42.8 % (ref 37–47)
HDLC SERPL-MCNC: 68 MG/DL
HGB BLD-MCNC: 14.2 G/DL (ref 12–16)
LDH SERPL L TO P-CCNC: 214 U/L (ref 107–266)
LDLC SERPL CALC-MCNC: 67 MG/DL
MCH RBC QN AUTO: 30.4 PG (ref 27–33)
MCHC RBC AUTO-ENTMCNC: 33.2 G/DL (ref 32.2–35.5)
MCV RBC AUTO: 91.6 FL (ref 81.4–97.8)
PLATELET # BLD AUTO: 196 K/UL (ref 164–446)
PMV BLD AUTO: 10.1 FL (ref 9–12.9)
POTASSIUM SERPL-SCNC: 4.4 MMOL/L (ref 3.6–5.5)
PROT SERPL-MCNC: 7 G/DL (ref 6–8.2)
RBC # BLD AUTO: 4.67 M/UL (ref 4.2–5.4)
SODIUM SERPL-SCNC: 139 MMOL/L (ref 135–145)
TRIGL SERPL-MCNC: 75 MG/DL (ref 0–149)
TSH SERPL-ACNC: 4.5 UIU/ML (ref 0.35–5.5)
WBC # BLD AUTO: 6.4 K/UL (ref 4.8–10.8)

## 2024-11-19 PROCEDURE — 84443 ASSAY THYROID STIM HORMONE: CPT

## 2024-11-19 PROCEDURE — 83615 LACTATE (LD) (LDH) ENZYME: CPT

## 2024-11-19 PROCEDURE — 36415 COLL VENOUS BLD VENIPUNCTURE: CPT

## 2024-11-19 PROCEDURE — 80061 LIPID PANEL: CPT

## 2024-11-19 PROCEDURE — 80053 COMPREHEN METABOLIC PANEL: CPT

## 2024-11-19 PROCEDURE — 85027 COMPLETE CBC AUTOMATED: CPT

## 2024-12-05 ENCOUNTER — OFFICE VISIT (OUTPATIENT)
Dept: CARDIOLOGY | Facility: MEDICAL CENTER | Age: 69
End: 2024-12-05
Attending: INTERNAL MEDICINE
Payer: MEDICARE

## 2024-12-05 VITALS
SYSTOLIC BLOOD PRESSURE: 110 MMHG | HEIGHT: 68 IN | OXYGEN SATURATION: 94 % | DIASTOLIC BLOOD PRESSURE: 66 MMHG | BODY MASS INDEX: 28.34 KG/M2 | HEART RATE: 69 BPM | WEIGHT: 187 LBS

## 2024-12-05 DIAGNOSIS — I42.8 NONISCHEMIC CARDIOMYOPATHY (HCC): ICD-10-CM

## 2024-12-05 DIAGNOSIS — E78.5 DYSLIPIDEMIA: ICD-10-CM

## 2024-12-05 DIAGNOSIS — I10 ESSENTIAL HYPERTENSION: ICD-10-CM

## 2024-12-05 DIAGNOSIS — R00.2 PALPITATIONS: ICD-10-CM

## 2024-12-05 DIAGNOSIS — R94.31 ABNORMAL EKG: ICD-10-CM

## 2024-12-05 DIAGNOSIS — I25.10 ATHEROSCLEROSIS OF NATIVE CORONARY ARTERY OF NATIVE HEART WITHOUT ANGINA PECTORIS: ICD-10-CM

## 2024-12-05 DIAGNOSIS — I35.1 MILD AORTIC INSUFFICIENCY: ICD-10-CM

## 2024-12-05 DIAGNOSIS — I44.7 LBBB (LEFT BUNDLE BRANCH BLOCK): ICD-10-CM

## 2024-12-05 PROCEDURE — 99212 OFFICE O/P EST SF 10 MIN: CPT | Performed by: INTERNAL MEDICINE

## 2024-12-05 PROCEDURE — 3078F DIAST BP <80 MM HG: CPT | Performed by: INTERNAL MEDICINE

## 2024-12-05 PROCEDURE — 3074F SYST BP LT 130 MM HG: CPT | Performed by: INTERNAL MEDICINE

## 2024-12-05 PROCEDURE — 99214 OFFICE O/P EST MOD 30 MIN: CPT | Performed by: INTERNAL MEDICINE

## 2024-12-05 ASSESSMENT — ENCOUNTER SYMPTOMS
MYALGIAS: 0
PALPITATIONS: 0
LOSS OF CONSCIOUSNESS: 0
DIZZINESS: 0
SHORTNESS OF BREATH: 0
COUGH: 0

## 2024-12-05 ASSESSMENT — FIBROSIS 4 INDEX: FIB4 SCORE: 1.48

## 2024-12-05 NOTE — PROGRESS NOTES
"Chief Complaint   Patient presents with    Hypertension    Cardiomyopathy (Non-ischemic)    Dyslipidemia       Subjective     Karla Villeda is a 69 y.o. female who presents today for follow-up cardiac care.    The patient has a nonischemic cardiomyopathy with recovered LVEF, LBBB, coronary calcification (calcium score 108), aortic insufficiency, dyslipidemia, LAKIA on CPAP, B-cell lymphoma 2012 with chemotherapy, peripheral neuropathy (chemotherapy induced).    Last seen 12/5/2023.  Since last appointment the patient been doing well.  She does have occasional palpitations predominantly at rest.  No lightheadedness or dizziness.  No chest pain or shortness of breath.  She did note that during her recent 9-day visit in [place], [place] she had no palpitations.  Prior cardiac monitor showed symptomatic brief episodes of SVT and PACs.  She has been on long-term metoprolol ER 25 mg daily    The patient reports undergoing chemotherapy for her lymphoma in 2012 while living in [place], [place].  Shortly after her first chemotherapy treatment she developed bradycardia and required hospitalization for up to 5 days.  In 2017 she was discovered to have a left bundle branch block.  An initial echocardiogram showed an LVEF of 50% and she was started on metoprolol.  A follow-up echocardiogram showed an LVEF of less than 50% was started on losartan.  Had coronary angiograms in 2012 and 2018 [place], [place] and was told that she had normal coronary arteries.      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    coccidiodies Pneumonia in 1998    History of squamous cell carcinoma in " situ of skin 11/17/2022 6/2022      LBBB (left bundle branch block) 01/07/2020    LAKIA on CPAP 01/07/2020     Past Surgical History:   Procedure Laterality Date    LUMBAR LAMINECTOMY DISKECTOMY      LYMPH NODE EXCISION      TONSILLECTOMY      TONSILLECTOMY AND ADENOIDECTOMY      TUBAL COAGULATION LAPAROSCOPIC BILATERAL       Family History   Problem Relation Age of Onset    Hypertension Mother     Lung Disease Father     Cancer Father         lung    Cancer Brother         melanoma    Cancer Brother         liver, testicular    Sleep Apnea Neg Hx     Heart Attack Neg Hx      Social History     Socioeconomic History    Marital status:      Spouse name: Not on file    Number of children: Not on file    Years of education: Not on file    Highest education level: Not on file   Occupational History    Not on file   Tobacco Use    Smoking status: Never    Smokeless tobacco: Never   Vaping Use    Vaping status: Never Used   Substance and Sexual Activity    Alcohol use: Yes     Comment: 1 a week     Drug use: Never    Sexual activity: Yes     Partners: Male   Other Topics Concern    Not on file   Social History Narrative    Not on file     Social Drivers of Health     Financial Resource Strain: Not on file   Food Insecurity: Not on file   Transportation Needs: Not on file   Physical Activity: Not on file   Stress: Not on file   Social Connections: Not on file   Intimate Partner Violence: Not on file   Housing Stability: Not on file     Allergies   Allergen Reactions    Ampicillin      rash    Biaxin [Clarithromycin]      rash    Sulfa Drugs      rash     Outpatient Encounter Medications as of 12/5/2024   Medication Sig Dispense Refill    gabapentin (NEURONTIN) 100 MG Cap TAKE 1 CAPSULE BY MOUTH THREE TIMES A  Capsule 3    metoprolol SR (TOPROL XL) 25 MG TABLET SR 24 HR Take 1 Tablet by mouth every day. 100 Tablet 3    losartan (COZAAR) 25 MG Tab TAKE 1 TABLET BY MOUTH EVERY  Tablet 3    atorvastatin  "(LIPITOR) 20 MG Tab Take 1 Tablet by mouth every evening. 100 Tablet 3    Multiple Vitamins-Minerals (PRESERVISION AREDS PO) Take  by mouth.      Omega-3 1000 MG Cap Take 1 Capsule by mouth every day.      Multiple Vitamins-Minerals (MULTIVITAMIN ADULT PO) Take  by mouth.      Calcium Carb-Cholecalciferol (CALCIUM + D3 PO) Take  by mouth.       No facility-administered encounter medications on file as of 12/5/2024.     Review of Systems   Respiratory:  Negative for cough and shortness of breath.    Cardiovascular:  Negative for chest pain and palpitations.   Musculoskeletal:  Negative for myalgias.   Neurological:  Negative for dizziness and loss of consciousness.              Objective     /66 (BP Location: Left arm, Patient Position: Sitting, BP Cuff Size: Adult)   Pulse 69   Ht 1.727 m (5' 8\")   Wt 84.8 kg (187 lb)   LMP  (LMP Unknown)   SpO2 94%   BMI 28.43 kg/m²     Physical Exam  Vitals reviewed.   Constitutional:       General: She is not in acute distress.     Appearance: She is well-developed.   Neck:      Vascular: No JVD.   Cardiovascular:      Rate and Rhythm: Normal rate and regular rhythm.      Pulses:           Radial pulses are 1+ on the right side and 1+ on the left side.      Heart sounds: Normal heart sounds. No murmur heard.     No friction rub. No gallop.   Pulmonary:      Effort: Pulmonary effort is normal. No accessory muscle usage or respiratory distress.      Breath sounds: Normal breath sounds. No wheezing or rales.   Abdominal:      Comments: Palpable aorta   Musculoskeletal:      Right lower leg: No edema.      Left lower leg: No edema.   Skin:     General: Skin is warm and dry.      Findings: No rash.      Nails: There is no clubbing.   Neurological:      Mental Status: She is alert and oriented to person, place, and time.   Psychiatric:         Behavior: Behavior normal.              Coronary CALCIUM SCAN 11/30/2022  Coronary calcification:  LMA - 0.0  LCX - 0.0  LAD - " 86.8  RCA - 21.2  Total Calcium Score: 108      ZioPatch Summary: 7 day monitor beginning 11-17-21.   1.  Sinus rhythm, BBB, with appropriate heart rate range. Average 82, range 56 to 145 bpm.   2.  Normal sinus node and AV node conduction normal. No pauses.   3.  Rare PACs.   4.  Rare PVCs.   5.  12 runs of SVT, up to 17 beats. No sustained rhythm changes.   6.  4 patient triggers during sinus with PACs and SVT, symptoms reported include skipped, irregular beats.  Conclusion: Sinus, brief runs of SVT. Symptomatic SVT and PACs. No sustained rhythm changes.     ECHOCARDIOGRAM 1/20/2021  Normal left ventricular size, wall thickness, and systolic function.  Normal right ventricular size and systolic function.  Mild aortic insufficiency.  Estimated right ventricular systolic pressure  is 25 mmHg; normal.  Normal pericardium without effusion.  Ascending aorta diameter is 3.9 cm; borderline dilated.   No prior study is available for comparison.    EKG 12/5/2023 sinus rhythm, rate 61, left bundle branch block, personally interpreted    Assessment & Plan     1. LBBB (left bundle branch block)  EC-ECHOCARDIOGRAM COMPLETE W/ CONT    CANCELED: EC-ECHOCARDIOGRAM COMPLETE W/ CONT      2. Nonischemic cardiomyopathy (HCC)  EC-ECHOCARDIOGRAM COMPLETE W/ CONT    CANCELED: EC-ECHOCARDIOGRAM COMPLETE W/ CONT      3. Mild aortic insufficiency  EC-ECHOCARDIOGRAM COMPLETE W/ CONT    CANCELED: EC-ECHOCARDIOGRAM COMPLETE W/ CONT      4. Abnormal EKG  EC-ECHOCARDIOGRAM COMPLETE W/ CONT    CANCELED: EC-ECHOCARDIOGRAM COMPLETE W/ CONT      5. Atherosclerosis of native coronary artery of native heart without angina pectoris        6. Dyslipidemia        7. Essential hypertension        8. Palpitations              Medical Decision Making: Today's Assessment/Status/Plan:   Palpitations with prior cardiac monitor showing rare symptomatic episodes of brief SVT and PACs.  She has not overtly concerned about these episodes as they are not occurring  more frequent.  I did instruct her that she can take an additional half dose or full dose of metoprolol if her palpitations are worse and that if her palpitations become even more symptomatic she is to contact the office for further evaluation.  NICM with recovered LVEF, presumed related to chemotherapy and LBBB, continue metoprolol, losartan   LBBB, asymptomatic, continue to monitor.  Coronary calcification, asymptomatic with no ischemic symptoms, continue primary ASCVD therapy.  Aortic regurgitation, mild asymptomatic, continue to monitor  Dyslipidemia, reviewed most recent lipid panel on higher dose of atorvastatin, LDL 67, at goal, on atorvastatin.  Hypertension, BP normal, at goal on losartan, metoprolol  Follow-up echocardiogram  RTC 6 months with Yasmin Simpson MD her prior cardiologist

## 2024-12-30 ENCOUNTER — HOSPITAL ENCOUNTER (OUTPATIENT)
Dept: RADIOLOGY | Facility: MEDICAL CENTER | Age: 69
End: 2024-12-30
Attending: FAMILY MEDICINE
Payer: MEDICARE

## 2024-12-30 DIAGNOSIS — Z12.31 ENCOUNTER FOR SCREENING MAMMOGRAM FOR BREAST CANCER: ICD-10-CM

## 2024-12-30 PROCEDURE — 77067 SCR MAMMO BI INCL CAD: CPT

## 2025-01-01 DIAGNOSIS — I10 ESSENTIAL HYPERTENSION: ICD-10-CM

## 2025-01-01 DIAGNOSIS — I44.7 LBBB (LEFT BUNDLE BRANCH BLOCK): ICD-10-CM

## 2025-01-02 RX ORDER — METOPROLOL SUCCINATE 25 MG/1
25 TABLET, EXTENDED RELEASE ORAL DAILY
Qty: 90 TABLET | Refills: 3 | Status: SHIPPED | OUTPATIENT
Start: 2025-01-02

## 2025-01-02 NOTE — TELEPHONE ENCOUNTER
Received request via: Pharmacy    Was the patient seen in the last year in this department? Yes metoprolol SR (TOPROL XL) 25 MG TABLET SR 24 HR     Does the patient have an active prescription (recently filled or refills available) for medication(s) requested? No    Pharmacy Name: [facility]/pharmacy #[id number] - [place], [place] - [address]     Does the patient have halfway Plus and need 100-day supply? (This applies to ALL medications) Yes, quantity updated to 100 days

## 2025-01-05 DIAGNOSIS — E78.2 MIXED HYPERLIPIDEMIA: ICD-10-CM

## 2025-01-06 RX ORDER — ATORVASTATIN CALCIUM 20 MG/1
20 TABLET, FILM COATED ORAL EVERY EVENING
Qty: 100 TABLET | Refills: 3 | Status: SHIPPED | OUTPATIENT
Start: 2025-01-06

## 2025-01-06 NOTE — TELEPHONE ENCOUNTER
Is the patient due for a refill? Yes    Was the patient seen the past year? Yes    Date of last office visit: 12/5/2024    Does the patient have an upcoming appointment?  Yes   If yes, When? 6/6/2025    Provider to refill:NATALIA    Does the patient have [facility] and need 100-day supply? (This applies to ALL medications) Yes, quantity updated to 100 days

## 2025-01-21 ENCOUNTER — APPOINTMENT (OUTPATIENT)
Dept: CARDIOLOGY | Facility: MEDICAL CENTER | Age: 70
End: 2025-01-21
Attending: INTERNAL MEDICINE
Payer: MEDICARE

## 2025-02-11 ENCOUNTER — OFFICE VISIT (OUTPATIENT)
Dept: DERMATOLOGY | Facility: IMAGING CENTER | Age: 70
End: 2025-02-11
Payer: MEDICARE

## 2025-02-11 DIAGNOSIS — L57.0 ACTINIC KERATOSIS: ICD-10-CM

## 2025-02-11 PROCEDURE — 17000 DESTRUCT PREMALG LESION: CPT | Performed by: NURSE PRACTITIONER

## 2025-02-11 PROCEDURE — 17003 DESTRUCT PREMALG LES 2-14: CPT | Performed by: NURSE PRACTITIONER

## 2025-02-11 NOTE — PROGRESS NOTES
DERMATOLOGY NOTE  FOLLOW UP VISIT       Chief complaint: Follow-Up (Pdt )   Bilateral forearms, PDT in November 2024    Pt states scalp is itchy       History of skin cancer: Yes 06/2022 SCC Rt Pretibial had Moh's in July  History of precancers/actinic keratoses: Yes, Details: hands, arms, treated with liquid nitrogen and Efudex on forhead and arms  History of biopsies:Yes  History of blistering/severe sunburns:Yes, Details: childhood  Family history of skin cancer:Yes, Details: father SCC, brother melanoma  Family history of atypical moles:No      Allergies   Allergen Reactions    Ampicillin      rash    Biaxin [Clarithromycin]      rash    Sulfa Drugs      rash        MEDICATIONS:  Medications relevant to specialty reviewed.     REVIEW OF SYSTEMS:   Positive for skin (see HPI)  Negative for fevers and chills       EXAM:  LMP  (LMP Unknown)   Constitutional: Well-developed, well-nourished, and in no distress.     A focused skin exam was performed including the affected areas of the forearms. Notable findings on exam today listed below and/or in assessment/plan.     Few remaining Iil-defined erythematous gritty/scaly papules over the dorsum of  bilateral hands  No rash in scalp observed on exam    IMPRESSION / PLAN:    1. Actinic keratosis  CRYOTHERAPY:  Risks (including, but not limited to: skin discoloration, redness, blister, blood blister, recurrence, need for further treatment, infection, scar) and benefits of cryotherapy discussed. Patient verbally agreed to proceed with treatment. 1 cryotherapy freeze thaw cycles of 10 seconds were applied to 6 lesions on hands as noted on exam with cryac. Patient tolerated procedure well. Aftercare instructions given--no specific care needed unless irritated during healing process, can apply Vaseline with small band-aid if needed.      Of note, for itchy scalp, advised use of OTC HTC solution as needed      Pt understands risks associated with LN2,   Patient verbalized  understanding and agrees with plan regarding the above       Please note that this dictation was created using voice recognition software. I have made every reasonable attempt to correct obvious errors, but I expect that there are errors of grammar and possibly content that I did not discover before finalizing the note.      Return to clinic in: Return in about 5 months (around 7/11/2025). and as needed for any new or changing skin lesions.

## 2025-03-10 NOTE — Clinical Note
[facility]  [address] [place] [address]    [id number]    Karla Villeda  [address] [place] [address]    March 10, 2025    Member Name: Karla Villeda   Member Number: [id number]   Reference Number: [id number]   Approved Services: Echos and EKG   Approved Service Dates: 03/10/2025 - 07/08/2025   Requesting Provider: Yasmin Simpson   Requested Provider: [facility]     Dear Karla Villeda:    The following medical service(s) requested by Yasmin Simpson have been approved:    Procedure Code Procedure Code Name Requested Quantity Approved Quantity Status   99658 (CPT®) AL ECHO HEART XTHORACIC,COMPLETE W DOPPLER 1 1 Authorized       Approved Quantity means the number of visits approved for medication treatments and/or medical services.    The services should be provided by [facility] no later than 07/08/2025. Please contact the provider listed below with any questions.     Provider Information:  [facility]  [phone]    Your plan benefit may require a deductible, co-payment or coinsurance for these services. This authorization does not guarantee [facility] will pay the claim for services that you receive. Payment by [facility] for these services is subject to the terms of your Evidence of Coverage, your eligibility at the time of service, and confirmation of benefit coverage.    For any questions or additional information, please contact Customer Service:    [facility] Toll Free: [phone]  TTY users dial: [phone]   Call Center Hours:  Oct 1 - Mar 31, Mon - Fri 7 AM to 8 PM PST  Oct 1 - Mar 31, Sat - Sun 8 AM to 8 PM PST  Apr 1 - Sep 30, Mon - Fri 7 AM to 8 PM PST   Office Hours: Mon - Fri 8 AM to 5 PM PST   E-mail: [email or web address]   Website:  [email or web address]      This information is available for free in other languages. Please contact  Customer Service at the phone number above for more information. [facility] complies with applicable Federal civil rights laws and does not discriminate on the basis of race, color, national origin, age, disability or sex.    Sincerely,     Healthcare Utilization Management Department     Cc: [facility]   Yasmin Simpson    Multi-Language Insert  Multi- Services  English: We have free  services to answer any questions you may have about our health or drug plan.  To get an , just call us at [phone].  Someone who speaks English/Language can help you.  This is a free service.  Maltese: Tenemos servicios de intérprete sin costo alguno  para responder cualquier pregunta que pueda tener sobre nuestro plan de christiano o medicamentos. Para hablar con un intérprete, por favor llame al [phone]. Alguien que hable español le podrá ayudar. Aan es un servicio gratuito.  Chinese Mandarin: ?????????????????????????????? ???????????????? [phone]????????????????? ?????????  Chinese Cantonese: ?????????????????????????????? ????????????? [phone]???????????????????? ????????  Tagalog:  Racquel ny serbisyo sa carranzasasalnalini wileya mesha lenggiabby davilagjamshidlusualma o panggamot.  [place]  [phone]. Maaari kagunnar Flores.  Rehan powers.  Iraqi:  Nous proposons everett services gratuits d'interprétation pour répondre à toutes keven questions relatives à notre régime de santé ou d'assurance-médicaments. Pour accéder au service d'interprétation, il vous suffit de nous appeler au [phone]. Un interlocuteur parMayo Clinic Health System Franciscan Healthcarekareem Françs pourra vous aider. Ce service est gratuit.  Wallisian:  Lynne drake có d?ch v? thông d?ch mi?n phí ð? tr? l?i các câu h?i v? chýõng s?c kh?e và chýõng trìn thu?c men. N?u  quí v? c?n thông d?ch viên delisa g?i [phone] s? có nhân viên nói ti?ng Vi?t giúp ð? quí v?. Ðây là d?ch v? mi?n phí .  Upper sorbian:  Unser kostenloser Dolmetscherservice beantwortet Ihren Fragen zu unserem Gesundheits- und Arzneimittelplan. Unsere Dolmetscher erreichen Sie [phone]. Man wird Ihnen slime auf [facility]. Dieser Service ist [facility].  German:  ??? ?? ?? ?? ?? ??? ?? ??? ?? ???? ?? ?? ???? ???? ????. ?? ???? ????? ?? [phone] ??? ??? ????.  ???? ?? ???? ?? ?? ????. ? ???? ??? ?????.   Equatorial Guinean: Åñëè ó âàñ âîçíèêíóò âîïðîñû îòíîñèòåëüíî ñòðàõîâîãî èëè ìåäèêàìåíòíîãî ïëàíà, âû ìîæåòå âîñïîëüçîâàòüñÿ íàøèìè áåñïëàòíûìè óñëóãàìè ïåðåâîä÷èêîâ. ×òîáû âîñïîëüçîâàòüñÿ óñëóãàìè ïåðåâîä÷èêà, ïîçâîíèòå íàì ïî òåëåôîíó [phone]. Âàì îêàæåò ïîìîùü ñîòðóäíèê, êîòîðûé ãîâîðèò ïî-póññêè. Äàííàÿ óñëóãà áåñïëàòíàÿ.  Macedonian: ÅääÇ äÞÏã ÎÏãÇÊ ÇáãÊÑÌã ÇáÝæÑí ÇáãÌÇäíÉ ááÅÌÇÈÉ Úä Ãí ÃÓÆáÉ ÊÊÚáÞ ÈÇáÕÍÉ Ãæ ÌÏæá ÇáÃÏæíÉ áÏíäÇ. ááÍÕæá Úáì ãÊÑÌã ÝæÑí¡ áíÓ Úáíß Óæì ÇáÇÊÕÇá ÈäÇ Úáì [phone] . ÓíÞæã ÔÎÕ ãÇ íÊÍÏË ÇáÚÑÈíÉ ÈãÓÇÚÏÊß. åÐå ÎÏãÉ ãÌÇäíÉ.  Manny: ????? ????????? ?? ??? ?? ????? ?? ???? ??? ???? ???? ?? ?????? ?? ???? ???? ?? ??? ????? ??? ????? ???????? ?????? ?????? ???. ?? ???????? ??????? ???? ?? ???, ?? ???? [phone] ?? ??? ????. ??? ??????? ?? ?????? ????? ?? ???? ??? ?? ???? ??. ?? ?? ????? ???? ??.   Kyrgyz:  È disponibile un servizio di interpretariato gratuito per rispondere a eventuali domande sul nostro piano sanitario e farmaceutico. Per un interprete, contattare il lisbeth [phone]. Un nostro incaricato denny parla Italianovi fornirà l'assistenza necessaria. È un servizio gratuito.  Portugués:  Dispomos de serviços de interpretação gratuitos para responder a qualquer questão que tenha acerca do nosso plano de saúde ou de medicação. Para obter um intérprete, contacte-nos através do número [phone]. Irá encontrar alguém que fale o idioma  Português para o ajudar. Ana  serviço é gratuito.  Telugu Creole:  Nou genyen sèvis entèprèt gratis brannon reponn tout kesyon ou ta genyen konsènan plan medikal oswa dwòg nou an.  Brannon jwenn yon entèprèt, jis rele nou nan [phone]. Yon moun ki pale Kreyòl kapab joshua w.  Sa a se yon sèvis ki gratis.  Polish:  Umo¿liwiamy bezp³atne skorzystanie z us³ug t³umacza ustnego, który pomo¿e w uzyskaniu odpowiedzi na temat planu zdrowotnego lub dawsaima edwards. Milly skorzystaæ z pomocy t³umacza znaj¹cego patrica crews¿y zadzwoniæ pod numer [phone]. Ta us³uga jest bezp³atna.  Northern Irish: ????? ??????? ????????????????????? ??????????????????????????????????[phone] ???????????????? ? ????????????????? ?????

## 2025-03-14 ENCOUNTER — TELEPHONE (OUTPATIENT)
Dept: HEALTH INFORMATION MANAGEMENT | Facility: OTHER | Age: 70
End: 2025-03-14

## 2025-03-20 ENCOUNTER — HOSPITAL ENCOUNTER (OUTPATIENT)
Dept: CARDIOLOGY | Facility: MEDICAL CENTER | Age: 70
End: 2025-03-20
Attending: INTERNAL MEDICINE
Payer: MEDICARE

## 2025-03-20 DIAGNOSIS — I42.8 NONISCHEMIC CARDIOMYOPATHY (HCC): ICD-10-CM

## 2025-03-20 DIAGNOSIS — R94.31 ABNORMAL EKG: ICD-10-CM

## 2025-03-20 DIAGNOSIS — I44.7 LBBB (LEFT BUNDLE BRANCH BLOCK): ICD-10-CM

## 2025-03-20 DIAGNOSIS — I35.1 MILD AORTIC INSUFFICIENCY: ICD-10-CM

## 2025-03-20 PROCEDURE — 700117 HCHG RX CONTRAST REV CODE 255: Performed by: FAMILY MEDICINE

## 2025-03-20 PROCEDURE — 93306 TTE W/DOPPLER COMPLETE: CPT

## 2025-03-20 RX ADMIN — HUMAN ALBUMIN MICROSPHERES AND PERFLUTREN 3 ML: 10; .22 INJECTION, SOLUTION INTRAVENOUS at 10:15

## 2025-03-24 ENCOUNTER — RESULTS FOLLOW-UP (OUTPATIENT)
Dept: CARDIOLOGY | Facility: MEDICAL CENTER | Age: 70
End: 2025-03-24

## 2025-03-24 LAB
LV EJECT FRACT  99904: 60
LV EJECT FRACT MOD 2C 99903: 48.51
LV EJECT FRACT MOD 4C 99902: 54.47
LV EJECT FRACT MOD BP 99901: 52.23

## 2025-05-09 ENCOUNTER — OFFICE VISIT (OUTPATIENT)
Dept: MEDICAL GROUP | Facility: MEDICAL CENTER | Age: 70
End: 2025-05-09
Payer: MEDICARE

## 2025-05-09 VITALS
WEIGHT: 184.8 LBS | OXYGEN SATURATION: 97 % | RESPIRATION RATE: 14 BRPM | SYSTOLIC BLOOD PRESSURE: 118 MMHG | HEIGHT: 68 IN | BODY MASS INDEX: 28.01 KG/M2 | HEART RATE: 76 BPM | TEMPERATURE: 97 F | DIASTOLIC BLOOD PRESSURE: 56 MMHG

## 2025-05-09 DIAGNOSIS — Z85.72 HISTORY OF B-CELL LYMPHOMA: ICD-10-CM

## 2025-05-09 DIAGNOSIS — Z78.0 POSTMENOPAUSAL STATUS: ICD-10-CM

## 2025-05-09 DIAGNOSIS — R00.2 PALPITATIONS: ICD-10-CM

## 2025-05-09 DIAGNOSIS — I44.7 LBBB (LEFT BUNDLE BRANCH BLOCK): ICD-10-CM

## 2025-05-09 DIAGNOSIS — E78.5 DYSLIPIDEMIA: ICD-10-CM

## 2025-05-09 DIAGNOSIS — R79.89 ELEVATED TSH: ICD-10-CM

## 2025-05-09 DIAGNOSIS — I42.8 NONISCHEMIC CARDIOMYOPATHY (HCC): ICD-10-CM

## 2025-05-09 PROBLEM — I10 ESSENTIAL HYPERTENSION: Status: RESOLVED | Noted: 2020-01-07 | Resolved: 2025-05-09

## 2025-05-09 PROCEDURE — 3078F DIAST BP <80 MM HG: CPT | Performed by: FAMILY MEDICINE

## 2025-05-09 PROCEDURE — 3074F SYST BP LT 130 MM HG: CPT | Performed by: FAMILY MEDICINE

## 2025-05-09 PROCEDURE — 99214 OFFICE O/P EST MOD 30 MIN: CPT | Performed by: FAMILY MEDICINE

## 2025-05-09 RX ORDER — LOSARTAN POTASSIUM 25 MG/1
25 TABLET ORAL
Qty: 100 TABLET | Refills: 3 | Status: SHIPPED | OUTPATIENT
Start: 2025-05-09

## 2025-05-09 RX ORDER — METOPROLOL SUCCINATE 25 MG/1
25 TABLET, EXTENDED RELEASE ORAL DAILY
Qty: 100 TABLET | Refills: 3 | Status: SHIPPED | OUTPATIENT
Start: 2025-05-09

## 2025-05-09 ASSESSMENT — PATIENT HEALTH QUESTIONNAIRE - PHQ9: CLINICAL INTERPRETATION OF PHQ2 SCORE: 0

## 2025-05-09 ASSESSMENT — FIBROSIS 4 INDEX: FIB4 SCORE: 1.5

## 2025-05-09 NOTE — PROGRESS NOTES
Chief Complaint   Patient presents with    Follow-Up    Immunizations    Medication Refill       Subjective:     HPI:   Karla Villeda presents today with the followin. Dyslipidemia  Patient denies chest pain, chest pressure, palpitations or exertional shortness of breath. Patient denies muscle aches or muscle weakness from the atorvastatin medication. Patient is a never smoker. Patient takes no aspirin daily. Patient has no history of myocardial infarction, stroke or PVD.  Patient does have history of coronary artery atherosclerosis without angina.  Follow-up lab order discussed and placed.    2. Elevated TSH  Patient is not on thyroid supplementation.  TSH has fluctuated and occasionally been above target. Patient denies insomnia, tremor or change in appetite.  Denies increased fatigue.  Last TSH in November was normal but slightly above target.  Follow-up lab order discussed and placed.    3. Nonischemic cardiomyopathy (HCC)  Patient follows with cardiology.  She has been quite stable.  She is tolerating her losartan and metoprolol regimen as well as the atorvastatin.  She does not have a history of hypertension.  Renewals are sent.    4. LBBB (left bundle branch block)/Palpitations  She has left bundle branch block and palpitations and is on metoprolol for that issue, not for hypertension.  Renewal is sent.    5. History of B-cell lymphoma  Patient does have history of B-cell lymphoma.  She did very well with this.  Follow-up LDH lab order discussed and placed.    6. Postmenopausal status  Patient is due for bone density testing, order discussed and placed.  No history of pathologic fracture.        Patient Active Problem List    Diagnosis Date Noted    Palpitations 2024    Bilateral chronic knee pain 2024    Primary osteoarthritis of knees, bilateral 2024    Elevated TSH 2024    Dyslipidemia 2023    Nonischemic cardiomyopathy (HCC) 2023    Mild aortic  "insufficiency 05/17/2023    Aortic root enlargement (HCC) 05/17/2023    Atherosclerosis of native coronary artery of native heart without angina pectoris 05/17/2023    Uterine prolapse 05/17/2023    History of severe sun exposure 05/17/2023    History of squamous cell carcinoma in situ of skin 11/17/2022    History of B-cell lymphoma 08/12/2021    Chemotherapy-induced peripheral neuropathy (HCC) 08/12/2021    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 100 Tablet 3    metoprolol SR (TOPROL XL) 25 MG TABLET SR 24 HR Take 1 Tablet by mouth every day. 100 Tablet 3    atorvastatin (LIPITOR) 20 MG Tab TAKE 1 TABLET BY MOUTH EVERY DAY IN THE EVENING 100 Tablet 3    gabapentin (NEURONTIN) 100 MG Cap TAKE 1 CAPSULE BY MOUTH THREE TIMES A  Capsule 3    Multiple Vitamins-Minerals (PRESERVISION AREDS PO) Take  by mouth.      Omega-3 1000 MG Cap Take 1 Capsule by mouth every day.      Multiple Vitamins-Minerals (MULTIVITAMIN ADULT PO) Take  by mouth.      Calcium Carb-Cholecalciferol (CALCIUM + D3 PO) Take  by mouth.       No current facility-administered medications for this visit.       Allergies   Allergen Reactions    Ampicillin      rash    Biaxin [Clarithromycin]      rash    Sulfa Drugs      rash       ROS: As per HPI       Objective:     /56   Pulse 76   Temp 36.1 °C (97 °F) (Temporal)   Resp 14   Ht 1.727 m (5' 8\")   Wt 83.8 kg (184 lb 12.8 oz)   SpO2 97%  Body mass index is 28.1 kg/m².    Physical Exam:  Constitutional: Well-developed and well-nourished. Not diaphoretic. No distress. Lucid and fluent.  Skin: Skin is warm and dry. No rash noted.  Head: Atraumatic without lesions.  Eyes: Conjunctivae and extraocular motions are " normal. Pupils are equal, round, and reactive to light. No scleral icterus.   Ears:  External ears unremarkable.   Neck: Supple, trachea midline. No thyromegaly present. No cervical or supraclavicular lymphadenopathy. No JVD or carotid bruits appreciated  Cardiovascular: Regular rate and rhythm.  Normal S1, S2 without murmur appreciated.  Chest: Effort normal. Clear to auscultation throughout. No adventitious sounds.   Abdomen: Soft, non tender, and without distention. Active bowel sounds in all four quadrants. No rebound, guarding, masses or hepatosplenomegaly.  Extremities: No cyanosis, clubbing, erythema, nor edema.   Neurological: Alert and oriented x 3.  No tremor appreciated.  Psychiatric:  Behavior, mood, and affect are appropriate.       Assessment and Plan:     70 y.o. female with the following issues:    1. Dyslipidemia  Comp Metabolic Panel    CBC WITHOUT DIFFERENTIAL    TSH    VITAMIN D,25 HYDROXY (DEFICIENCY)    Lipid Profile      2. Elevated TSH  TSH      3. Nonischemic cardiomyopathy (HCC)  Comp Metabolic Panel    CBC WITHOUT DIFFERENTIAL    TSH    Lipid Profile    losartan (COZAAR) 25 MG Tab      4. LBBB (left bundle branch block)  metoprolol SR (TOPROL XL) 25 MG TABLET SR 24 HR      5. Palpitations  metoprolol SR (TOPROL XL) 25 MG TABLET SR 24 HR      6. History of B-cell lymphoma  CBC WITHOUT DIFFERENTIAL    LDH      7. Postmenopausal status  DS-BONE DENSITY STUDY (DEXA)            Followup: Return in about 6 months (around 11/9/2025), or if symptoms worsen or fail to improve.

## 2025-06-18 ENCOUNTER — RESULTS FOLLOW-UP (OUTPATIENT)
Dept: MEDICAL GROUP | Facility: MEDICAL CENTER | Age: 70
End: 2025-06-18

## 2025-06-18 ENCOUNTER — HOSPITAL ENCOUNTER (OUTPATIENT)
Dept: RADIOLOGY | Facility: MEDICAL CENTER | Age: 70
End: 2025-06-18
Attending: FAMILY MEDICINE
Payer: MEDICARE

## 2025-06-18 DIAGNOSIS — Z78.0 POSTMENOPAUSAL STATUS: ICD-10-CM

## 2025-06-18 PROCEDURE — 77080 DXA BONE DENSITY AXIAL: CPT

## 2025-07-17 NOTE — PROGRESS NOTES
Medical decision making:  -Prior electrical abnormalities including nonsustained VT and bradycardia occurred during chemotherapy.  There is been no recurrence.  -Has palpitations consistent with some PACs and brief atrial runs, nothing concerning.  She can stand metoprolol succinate 25 mg or titrate up if she would like to try.  Instructions given.  -Prior mild LV dysfunction in setting of LBBB but no obstructive coronary disease has resolved.  EF is normal.  Remains on losartan and metoprolol.  No clinical CHF.  -ALMAZAN not alarming, seems appropriate for her level of activity.  Exercising regularly with no major cardiac symptoms.  -Incidentally mildly dilated ascending aorta at 3.9 to 4 cm based on echocardiogram and coronary CT in 2022.  Already on beta-blocker, statin and practicing good blood pressure control.  I do not think this going to be an issue in her lifetime.  Because we have imaging 3 years apart, I do not think we need to check again for another 2 to 3 years which would be [id number].  -Blood pressure looks good.  Never actually had hypertension, medications were given for LV dysfunction.  -No concerns regarding left bundle branch block  -She is opted for primary prevention statin after a calcium score.  LDL to goal for primary prevention.  To know more about her vascular risk, checking lipoprotein a, apolipoprotein B and hs-CRP with her next blood draw this follow-up, there is no rush.  - RTC with me in 9 months and if she continues to do well then yearly.  I will send a Quigo message when I see her labs.    Problem list:  1. Nonischemic cardiomyopathy (HCC)    2. LBBB (left bundle branch block)    3. Palpitations    4. SVT (supraventricular tachycardia) (HCC)    5. Premature atrial contractions    6. Coronary artery calcification seen on CT scan    7. Mild aortic insufficiency    8. Mixed hyperlipidemia  - APOLIPOPROTEIN B; Future  - CRP HIGH SENSITIVE (CARDIAC); Future  - Lipoprotein (a);  Future    9. Essential hypertension    10. LAKIA on CPAP    11. ALMAZAN (dyspnea on exertion)    Other orders  - Cranberry 50 MG Chew Tab; Chew.     Chief Complaint:   Chief Complaint   Patient presents with    Dyslipidemia    Other     F/V Dx: LBBB (left bundle branch block)     History of Present Illness:  Karla Villeda is a 70 y.o. female who returns for long-term management of  nonischemic cardiomyopathy/LV dysfunction (recovered), LBBB, prior VT and bradycardia during chemotherapy, palpitations.     In 2012, on chemo for  non-Hodgkin's lymphoma, doxorubicin, vincristine, rituximab, cyclophosphamide.  Radiation also to the left chest.  Was having VT and bradycardia, thought to be rituximab, this was stopped and she finished other therapies and did well.  VT and bradycardia have not recurred.  Heart monitor worn in 2021, very reassuring, average heart rate 82.  Has been on low-dose metoprolol for palpitations.     In 2017, eval for CP, ED eval was ok, but ecg with LBBB.     Mild LV dysfunction, EF 40 to 45%, echo in [place].  Cardiac catheterization in 2018 without significant CAD, also in [place].  Started on low EF medications, blood pressure recovered.  Was on low dose coreg , now on metoprolol for once daily dosing.  Also on losartan.  EF Jan 2021 65%.    She has never actually had hypertension.  Medications were started for LV dysfunction.  Blood pressure is controlled on these medications.     Has hyperlipidemia,restarted on statin therapy.  LDL cholesterol 67.  Calcium score 108 in 2022.     Has LAKIA, CPAP.     Has mild ALMAZAN at times, going up 2-3 flights, not changing, not alarming.  Has intermittent mild ankle edema, gone in the morning.  No orthopnea.     Has mild palpitations, heart will race, lasts seconds, more at rest.  Happening sometimes once per day.  Caffeine makes it worse.  No significant lightheadedness, or presyncope/syncope.   Thyroid testing normal January 2021.     Walking 2-3  miles 2 times per week, pool aerobics.  Not limited by chest pain, pressure or tightness with activity.   No symptoms of leg claudication.   No stroke/TIA like symptoms.     No prior hypertension.  No family history of premature coronary artery disease.  No prior smoking history.  No history of diabetes.  No history of autoimmune disease such as lupus or rheumatoid arthritis.  No chronic kidney disease.  No ETOH overuse.  No caffeine overuse.  No recreation substance use.  No hx asthma.     , Paulo not here today.  Live in [place].  Retired.    Wt Readings from Last 5 Encounters:   07/30/25 84.5 kg (186 lb 4.6 oz)   05/09/25 83.8 kg (184 lb 12.8 oz)   12/05/24 84.8 kg (187 lb)   11/18/24 83.9 kg (185 lb)   11/14/24 83.9 kg (185 lb)     DATA REVIEWED by me:  ECG (my personal interpretation)  7/10/2020 Sinus, 64, left bundle branch block    Heart Monitor    12-6-21  ZioPatch Summary: 7 day monitor beginning 11-17-21.   1.  Sinus rhythm, BBB, with appropriate heart rate range. Average 82, range 56 to 145 bpm.   2.  Normal sinus node and AV node conduction normal. No pauses.   3.  Rare PACs.   4.  Rare PVCs.   5.  12 runs of SVT, up to 17 beats. No sustained rhythm changes.   6.  4 patient triggers during sinus with PACs and SVT, symptoms reported include skipped, irregular beats.   Conclusion: Sinus, brief runs of SVT. Symptomatic SVT and PACs. No sustained rhythm changes.     Device check    NA    EP procedure  NA    Echocardiogram    3-24-25  The left ventricular ejection fraction is visually estimated to be 60%.  Abnormal septal motion consistent with left bundle branch block.  Mild aortic insufficiency.  Estimated right ventricular systolic pressure is 30 mmHg.  The ascending aorta is dilated with a diameter of 3.9. cm.  Compared to the prior study on 1/20/2021, no change.  1/21/2021-EF 60%  Normal left ventricular size, wall thickness, and systolic function.  Normal right ventricular size and systolic  "function.  Mild aortic insufficiency.  Estimated right ventricular systolic pressure  is 25 mmHg; normal.  Normal pericardium without effusion.  Ascending aorta diameter is 3.9 cm; borderline dilated.     Heart Catheterization    NA    Stress test    N/A    CAC  11-30-22-reviewed by me, ascending aorta 3.94 cm.  Coronary calcification:  LMA - 0.0  LCX - 0.0  LAD - 86.8  RCA - 21.2   Total Calcium Score: 108..    US Abd   6-27-23  Normal abdominal aortic ultrasound for age. No AAA.     Most recent labs:        No results found for: \"LIPOPROTA\"  Lab Results   Component Value Date/Time    WBC 6.4 11/19/2024 06:49 AM    HEMOGLOBIN 14.2 11/19/2024 06:49 AM    HEMATOCRIT 42.8 11/19/2024 06:49 AM    MCV 91.6 11/19/2024 06:49 AM      Lab Results   Component Value Date/Time    SODIUM 139 11/19/2024 06:49 AM    POTASSIUM 4.4 11/19/2024 06:49 AM    CHLORIDE 104 11/19/2024 06:49 AM    CO2 27 11/19/2024 06:49 AM    GLUCOSE 96 11/19/2024 06:49 AM    BUN 19 11/19/2024 06:49 AM    CREATININE 0.79 11/19/2024 06:49 AM      Lab Results   Component Value Date/Time    ASTSGOT 20 11/19/2024 06:49 AM    ALTSGPT 22 11/19/2024 06:49 AM    ALBUMIN 4.3 11/19/2024 06:49 AM      Lab Results   Component Value Date/Time    CHOLSTRLTOT 150 11/19/2024 06:49 AM    LDL 67 11/19/2024 06:49 AM    HDL 68 11/19/2024 06:49 AM    TRIGLYCERIDE 75 11/19/2024 06:49 AM     No results for input(s): \"NTPROBNP\", \"TROPONINT\" in the last 72 hours.      Past Medical History[1]  Past Surgical History[2]  Family History   Problem Relation Age of Onset    Hypertension Mother     Lung Disease Father     Cancer Father         lung    Cancer Brother         melanoma    Cancer Brother         liver, testicular    Sleep Apnea Neg Hx     Heart Attack Neg Hx      Social History     Socioeconomic History    Marital status:      Spouse name: Not on file    Number of children: Not on file    Years of education: Not on file    Highest education level: Not on file " "  Occupational History    Not on file   Tobacco Use    Smoking status: Never    Smokeless tobacco: Never   Vaping Use    Vaping status: Never Used   Substance and Sexual Activity    Alcohol use: Yes     Comment: 1 a week     Drug use: Never    Sexual activity: Yes     Partners: Male   Other Topics Concern    Not on file   Social History Narrative    Not on file     Social Drivers of Health     Financial Resource Strain: Not on file   Food Insecurity: Not on file   Transportation Needs: Not on file   Physical Activity: Not on file   Stress: Not on file   Social Connections: Not on file   Intimate Partner Violence: Not on file   Housing Stability: Not on file     Allergies[3]    Current Medications[4]    ROS  All others systems reviewed and negative.    /68 (BP Location: Left arm, Patient Position: Sitting, BP Cuff Size: Adult)   Pulse 72   Resp 16   Ht 1.727 m (5' 8\")   Wt 84.5 kg (186 lb 4.6 oz)   SpO2 95%  Body mass index is 28.33 kg/m².    General: No acute distress. Well nourished.  HEENT: EOM grossly intact, no scleral icterus, no pharyngeal erythema.   Neck:  No JVD, no bruits, trachea midline  CVS: RRR. Normal S1, S2. No M/R/G. No LE edema.  2+ radial pulses, 2+ PT pulses  Resp: CTAB. No wheezing or crackles/rhonchi. Normal respiratory effort.  Abdomen: Soft, NT, no barbara hepatomegaly.  MSK/Ext: No clubbing or cyanosis.  Skin: Warm and dry, no rashes.  Neurological: CN III-XII grossly intact. No focal deficits.   Psyh: A&O x 3, appropriate affect, good judgement    Physical Exam listed below was completed in entrety today and unchanged from 11-4-21 except where noted.  Some elements were copied from my note of that same day, which have been updated where appropriate and all reflect current meedical decision making from today.  I have confirmed and edited as necessary, the PFSH and ROS obtained by others.    Return in about 9 months (around 4/30/2026).    Written instructions given today:      - The " heart palpitations you experience are not alarming to us.  I would be concerned if you had rapid heart racing lasting more than 15 to 30 minutes without stopping.  Therefore, we do not need to do anything.  However, if they become a distraction or a nuisance to you and you would like to know if you can make them go away, you simply increase your metoprolol succinate from 1 tablet to 1.5 tablets or 2 tablets every day.  You can try this experiment whenever you want on your own.  If you go up by one half or 2 tablets and you feel worse, then you know you are at the right dose and you go back down to 1 tablet.    - If you are doing better on a higher dose of metoprolol, simply send us a [place] message so we can give you the new prescription at the higher dose.  Keep in mind that the maximum daily dose of metoprolol succinate is 200 mg.  You are on a very low dose.    *Fasting blood work at your convenience to check lipoprotein a, apolipoprotein B and hs-CRP to know more about your risk of a sudden heart attack or stroke from plaque rupture and inflammation.  There is no rush to having this done.      WWW.CARDIOSMART.ORG (American College of Cardiology website for patients)  Learn more about inflammation and risk reduction on this website.    Primary prevention of heart attack and stroke:     Most people over 70 have some degree of calcified heart disease that is not clinically relevant. Unfortunately, placing a stent over heart disease that is not clinically relevant (obstructive disease) does not reduce mortality.  We cannot bypass blockages that are not 70% or more because the bypass grafts will not mature and will be lost.  Blockages in the heart artery should only have a stent or bypass if they are more than 70% blocked and causing symptoms.  People having a sudden heart attack should have a stent placed in the blocked artery.  People having chest pain or symptoms that limits their ability to function could  consider having a stent placed in the artery or bypass.  *Stress tests are designed to  a heart blockage of 70% or more in a larger coronary blood vessel.  Stress tests are designed to test a symptom or to know more about your risk in certain circumstances.  Stress tests are not intended to randomly  disease and in many centers they are only about 70 to 75% accurate.  It is possible to have a false positive or false negative stress test.  *The plaques most likely to cause a sudden heart attack or stroke are the 20 to 30 to 40% plaques that carry higher risk of rupture, such as darker plaques with spikes of calcification.  We have no way of predicting which plaque is going to rupture.  We cannot preemptively place stents or bypass.  The highest priority for prevention is to reduce inflammation.    Lifestyle and medications are typically more important than stents or bypass outside of a medical emergency.    The most important things you can do to lower your risk of sudden heart attack (or stroke, which is the same disease but a different location- in the brain) are:  -Keep LDLc cholesterol under 100 (or under 70 or 55 if you have already had a heart attack/stroke or documented vascular disease). -Check Lipoprotein A and Apolipoprotein B to know more about your risk.  -If appropriate, take a statin medication (as a vascular medication, not just a cholesterol medication) which is our most powerful weapon to stabilize plaques and reduce inflammation making the plaques less likely to rupture and cause a sudden heart attack/stroke.  -Consider checking HS CRP (high sensitivity C reactive protein), an independent marker of inflammation and risk.  -Control blood pressure, under 130/80, ideally closer to 120/80.  -Control blood sugar.  -Don't smoke.  -Eat a heart healthy diet that reduces inflammation: Pritikin, Mediterranean, Plant Paradox.  -Get regular exercise: 150 minutes of moderate exercise OR 75 minutes  "of very vigorous exercise every week.  In general the more vigorous the exercise, the greater the benefit.  Avoid intense straining with heavy weights.  -Keep your body mass index under 30, ideally in the range of 20-25.  -Find cody in life, manage stress, develop close, meaningful, loving relationships (proven to help people live longer)  -Sleep well- adequate hours and quality of sleep matter.  -Know the signs and symptoms of heart attack and stroke and when to call 9-1-1.    Signs of a heart attack: In general, anything very intense coming from the chest that lasts more than 15 minutes, consider calling 911 or seek care in the emergency room for evaluation.  -Many people do not say chest \"pain,\" bit rather \"pressure,\" feeling like an \"elephant is sitting on\" their chest or someone \"parked a truck\" on their chest.  -Pain or pressure in the chest that is intense, lasts more than 15 minutes, not explained by other known reasons such as known/similar heartburn.  -It can feel like severe heart burn but associated with nausea, vomiting.  -It can be vague pain that radiates to the neck, jaw, shoulders, arm/arms, or wraps around your back or even cause a toothache.  -Can be associated with unusual shortness of breath at rest or a sense of impending doom.  *If you think you are having a heart attack, chew up 4 baby aspirin (81 mg) or a full 325 mg aspirin and call 911.    Signs of a stroke: sudden inability to talk, smile on one side, confusion, inability to move arm/leg on one side, numbness/tingling of arm/leg on one side that is not typical. \"Think FAST.\"  F=face drooping  A=arm weakness  S=Speech difficulty  T= time to call 911 (do not give ride to someone, call ambulance to alert the hospital to start stroke protocol)    Heart Healthy Diet lifestyles:  Mediterranean diet.  Pritikin - used at [facility]  DASH diet (especially for hypertension)  Plant paradox by Dr. Liu    Regarding " "statins:  -There is a lot of misinformation about statins.  -It is incredibly rare to have a debilitating reaction affecting the muscles.  -Some people have achy muscles without damage (this occurs in 5 to 6% of the population).  -If statin therapy is right for you and you cannot tolerate 1 statin, we have many different statins to try.  -Sometimes we find a reversible cause of statin intolerance such as vitamin D deficiency, co-Q10 deficiency or abnormal thyroid function.  -Keep working with your provider until you find a dose and brand of statin therapy that you can take without having any side effects.  -Side effects can also include abnormal liver testing which does not lead to permanent liver damage and sometimes we need to keep patients on statins and tolerate mildly abnormal liver function testing.  -For some people, blood sugar increases slightly on statins.  Statins do not cause diabetes, however 1 in 500 people with pre-diabetes become diabetic 6 months faster.  -Statins do not cause Alzheimer's.  Unfortunately, the incidence of cancer, Alzheimer's, vascular disease and a multitude of other health problems has increased as has the use of statins but there is no direct correlation or causation.  -If someone makes it to 75 without a reason to be on a statin for primary prevention then it is not necessary to start statin therapy until someone goes on to have a vascular event such as heart attack and stroke.  At that time statin therapy is started for what we refer to as \"secondary prevention.\"    Statin holiday:  Your cholesterol medication can cause muscle aches/sore joints but sometimes it can be difficult to know if it is happening.  Stop your statin therapy for 1 week, note if you feel better, resume the therapy at full dose, note if you feel worse again.  Report back to your doctor if you think the statin is making you feel worse.    Calcium Scores-AKA the tie breaker for statins:  A calcium score is a " "low radiation dose CT scan of the chest to look for calcification of the heart arteries.  It is a screening tool to help assess cardiovascular risk.  It can only reveal calcified disease in the heart arteries, it is still possible to have dark plaque coronary disease that cannot be seen on the scan.  It cannot tell us if the disease is on the inside or outside of the vessel, only whether or not it is there. I use it as a tool to decide if statin therapy is indicated (such as Lipitor or Crestor) to help with primary prevention of heart attack.  Statin therapy provides anti-inflammatory therapy to stabilize plaques in the vessels reducing the risk of plaque rupture heart attack (or stroke) but in doing so often leads to further calcification of the heart arteries and therefore I do not repeat the scan as it can look worse after statin therapy.  If you have other indications to be on statin therapy then I generally do not recommend the test.  It is a personal decision.  It is an out of pocket expense of approximately $100-200 and can be ordered at any time.  If done at [facility], we can review the images together. Let me know if you think this test is right for you.  Again, I typically only order it if it will /therapy, such as starting statin therapy. I typically do not order the test in people over 70.      Family History of Heart Disease:  *If you have a strong family history of heart attack, bypass surgery or stents in the heart, consider having yourself and everyone in your family take a fasting blood test called Lipoprotein A to know more about your genetic risk.  *Lipoprotein A, also known as \"Lipo little a,\" Lp(a)-things to know:  *Elevated Lp(a) is considered an independent risk factor for heart attack and stroke.  *It is possible to have a normal LDL cholesterol (LDL-C) and still have elevated Lp(a).  *Elevated Lp(a) is genetic/inherited 80-90% of the time.  *At present there are no targeted " therapies but there are clinical trials.  *For some patients Repatha/Praluent (PCSK9 inhibitor therapies) have been shown to reduce Lp(a) levels but the long term benefit is not yet known.  *Traditional risk factor modification such as eating well and exercising does not impact Lp(a) levels.  *Lp(a) elevation is considered a triple threat because it leads to plaque build up in the blood vessels, increases inflammation which drives plaque rupture resulting in sudden heart attacks and strokes and is also pro-thrombotic (meaning promoting a blood clot to form in the setting of a plaque rupture event).  *Some people with elevated levels may benefit from aspirin therapy but we do not yet know who those people may be. A case by case discussion is needed with your provider.  *Although taking a statin does not lower the level of Lp(a), the statin can reduce the inflammation to protect against plaque rupture.  Eating well, exercising, lower stress, controlling blood sugar also reduce and offset inflammation and are still very important for your health.    *Apolipoprotein B is now known to be a better marker of risk over the traditional LDLc.     *HS CRP, a marker of inflammation to motivate you to improve lifestyle to reduce your risk. It is only a screening tool.      *Low risk: < 1 mg/L     *Moderate risk: 1-3 mg/L      *High risk: > 3 mg/L      It is my pleasure to participate in the care of Ms. Mary Lou Villeda.  Please do not hesitate to contact me with questions or concerns.    Yasmin Simpson MD, [facility]  Cardiologist, [facility]    Please note that this dictation was created using voice recognition software. I have made every reasonable attempt to correct obvious errors, but it is possible there are errors of grammar and possibly content that I did not discover before finalizing the note.         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    coccidiodies Pneumonia in 1998    History of squamous cell carcinoma in situ of skin 11/17/2022 6/2022      LBBB (left bundle branch block) 01/07/2020    LAKIA on CPAP 01/07/2020   [2]   Past Surgical History:  Procedure Laterality Date    LUMBAR LAMINECTOMY DISKECTOMY      LYMPH NODE EXCISION      TONSILLECTOMY      TONSILLECTOMY AND ADENOIDECTOMY      TUBAL COAGULATION LAPAROSCOPIC BILATERAL     [3]   Allergies  Allergen Reactions    Ampicillin      rash    Biaxin [Clarithromycin]      rash    Sulfa Drugs      rash   [4]   Current Outpatient Medications   Medication Sig Dispense Refill    Cranberry 50 MG Chew Tab Chew.      losartan (COZAAR) 25 MG Tab Take 1 Tablet by mouth every day. 100 Tablet 3    metoprolol SR (TOPROL XL) 25 MG TABLET SR 24 HR Take 1 Tablet by mouth every day. 100 Tablet 3    atorvastatin (LIPITOR) 20 MG Tab TAKE 1 TABLET BY MOUTH EVERY DAY IN THE EVENING 100 Tablet 3    gabapentin (NEURONTIN) 100 MG Cap TAKE 1 CAPSULE BY MOUTH THREE TIMES A  Capsule 3    Multiple Vitamins-Minerals (PRESERVISION AREDS PO) Take  by mouth.      Omega-3 1000 MG Cap Take 1 Capsule by mouth every day.      Multiple Vitamins-Minerals (MULTIVITAMIN ADULT PO) Take  by mouth.      Calcium Carb-Cholecalciferol (CALCIUM + D3 PO) Take  by mouth.       No current facility-administered medications for this visit.

## 2025-07-30 ENCOUNTER — APPOINTMENT (OUTPATIENT)
Dept: CARDIOLOGY | Facility: PHYSICIAN GROUP | Age: 70
End: 2025-07-30
Payer: MEDICARE

## 2025-07-30 VITALS
RESPIRATION RATE: 16 BRPM | DIASTOLIC BLOOD PRESSURE: 68 MMHG | SYSTOLIC BLOOD PRESSURE: 126 MMHG | OXYGEN SATURATION: 95 % | BODY MASS INDEX: 28.23 KG/M2 | HEIGHT: 68 IN | HEART RATE: 72 BPM | WEIGHT: 186.29 LBS

## 2025-07-30 DIAGNOSIS — I49.1 PREMATURE ATRIAL CONTRACTIONS: ICD-10-CM

## 2025-07-30 DIAGNOSIS — R00.2 PALPITATIONS: ICD-10-CM

## 2025-07-30 DIAGNOSIS — I25.10 CORONARY ARTERY CALCIFICATION SEEN ON CT SCAN: ICD-10-CM

## 2025-07-30 DIAGNOSIS — I47.10 SVT (SUPRAVENTRICULAR TACHYCARDIA) (HCC): ICD-10-CM

## 2025-07-30 DIAGNOSIS — I42.8 NONISCHEMIC CARDIOMYOPATHY (HCC): Primary | ICD-10-CM

## 2025-07-30 DIAGNOSIS — I44.7 LBBB (LEFT BUNDLE BRANCH BLOCK): ICD-10-CM

## 2025-07-30 DIAGNOSIS — I10 ESSENTIAL HYPERTENSION: ICD-10-CM

## 2025-07-30 DIAGNOSIS — G47.33 OSA ON CPAP: ICD-10-CM

## 2025-07-30 DIAGNOSIS — E78.2 MIXED HYPERLIPIDEMIA: ICD-10-CM

## 2025-07-30 DIAGNOSIS — R06.09 DOE (DYSPNEA ON EXERTION): ICD-10-CM

## 2025-07-30 DIAGNOSIS — I35.1 MILD AORTIC INSUFFICIENCY: ICD-10-CM

## 2025-07-30 ASSESSMENT — FIBROSIS 4 INDEX: FIB4 SCORE: 1.5

## 2025-07-30 NOTE — PATIENT INSTRUCTIONS
- The heart palpitations you experience are not alarming to us.  I would be concerned if you had rapid heart racing lasting more than 15 to 30 minutes without stopping.  Therefore, we do not need to do anything.  However, if they become a distraction or a nuisance to you and you would like to know if you can make them go away, you simply increase your metoprolol succinate from 1 tablet to 1.5 tablets or 2 tablets every day.  You can try this experiment whenever you want on your own.  If you go up by one half or 2 tablets and you feel worse, then you know you are at the right dose and you go back down to 1 tablet.    - If you are doing better on a higher dose of metoprolol, simply send us a [employer] message so we can give you the new prescription at the higher dose.  Keep in mind that the maximum daily dose of metoprolol succinate is 200 mg.  You are on a very low dose.    *Fasting blood work at your convenience to check lipoprotein a, apolipoprotein B and hs-CRP to know more about your risk of a sudden heart attack or stroke from plaque rupture and inflammation.  There is no rush to having this done.      WWW.[email or web address].ORG (American College of Cardiology website for patients)  Learn more about inflammation and risk reduction on this website.    Primary prevention of heart attack and stroke:     Most people over 70 have some degree of calcified heart disease that is not clinically relevant. Unfortunately, placing a stent over heart disease that is not clinically relevant (obstructive disease) does not reduce mortality.  We cannot bypass blockages that are not 70% or more because the bypass grafts will not mature and will be lost.  Blockages in the heart artery should only have a stent or bypass if they are more than 70% blocked and causing symptoms.  People having a sudden heart attack should have a stent placed in the blocked artery.  People having chest pain or symptoms that limits their ability to function could  consider having a stent placed in the artery or bypass.  *Stress tests are designed to  a heart blockage of 70% or more in a larger coronary blood vessel.  Stress tests are designed to test a symptom or to know more about your risk in certain circumstances.  Stress tests are not intended to randomly  disease and in many centers they are only about 70 to 75% accurate.  It is possible to have a false positive or false negative stress test.  *The plaques most likely to cause a sudden heart attack or stroke are the 20 to 30 to 40% plaques that carry higher risk of rupture, such as darker plaques with spikes of calcification.  We have no way of predicting which plaque is going to rupture.  We cannot preemptively place stents or bypass.  The highest priority for prevention is to reduce inflammation.    Lifestyle and medications are typically more important than stents or bypass outside of a medical emergency.    The most important things you can do to lower your risk of sudden heart attack (or stroke, which is the same disease but a different location- in the brain) are:  -Keep LDLc cholesterol under 100 (or under 70 or 55 if you have already had a heart attack/stroke or documented vascular disease). -Check Lipoprotein A and Apolipoprotein B to know more about your risk.  -If appropriate, take a statin medication (as a vascular medication, not just a cholesterol medication) which is our most powerful weapon to stabilize plaques and reduce inflammation making the plaques less likely to rupture and cause a sudden heart attack/stroke.  -Consider checking HS CRP (high sensitivity C reactive protein), an independent marker of inflammation and risk.  -Control blood pressure, under 130/80, ideally closer to 120/80.  -Control blood sugar.  -Don't smoke.  -Eat a heart healthy diet that reduces inflammation: Pritikin, Mediterranean, Plant Paradox.  -Get regular exercise: 150 minutes of moderate exercise OR 75 minutes  "of very vigorous exercise every week.  In general the more vigorous the exercise, the greater the benefit.  Avoid intense straining with heavy weights.  -Keep your body mass index under 30, ideally in the range of 20-25.  -Find cody in life, manage stress, develop close, meaningful, loving relationships (proven to help people live longer)  -Sleep well- adequate hours and quality of sleep matter.  -Know the signs and symptoms of heart attack and stroke and when to call 9-1-1.    Signs of a heart attack: In general, anything very intense coming from the chest that lasts more than 15 minutes, consider calling 911 or seek care in the emergency room for evaluation.  -Many people do not say chest \"pain,\" bit rather \"pressure,\" feeling like an \"elephant is sitting on\" their chest or someone \"parked a truck\" on their chest.  -Pain or pressure in the chest that is intense, lasts more than 15 minutes, not explained by other known reasons such as known/similar heartburn.  -It can feel like severe heart burn but associated with nausea, vomiting.  -It can be vague pain that radiates to the neck, jaw, shoulders, arm/arms, or wraps around your back or even cause a toothache.  -Can be associated with unusual shortness of breath at rest or a sense of impending doom.  *If you think you are having a heart attack, chew up 4 baby aspirin (81 mg) or a full 325 mg aspirin and call 911.    Signs of a stroke: sudden inability to talk, smile on one side, confusion, inability to move arm/leg on one side, numbness/tingling of arm/leg on one side that is not typical. \"Think FAST.\"  F=face drooping  A=arm weakness  S=Speech difficulty  T= time to call 911 (do not give ride to someone, call ambulance to alert the hospital to start stroke protocol)    Heart Healthy Diet lifestyles:  Mediterranean diet.  Pritikin - used at [facility]  DASH diet (especially for hypertension)  Plant paradox by Dr. Liu    Regarding " "statins:  -There is a lot of misinformation about statins.  -It is incredibly rare to have a debilitating reaction affecting the muscles.  -Some people have achy muscles without damage (this occurs in 5 to 6% of the population).  -If statin therapy is right for you and you cannot tolerate 1 statin, we have many different statins to try.  -Sometimes we find a reversible cause of statin intolerance such as vitamin D deficiency, co-Q10 deficiency or abnormal thyroid function.  -Keep working with your provider until you find a dose and brand of statin therapy that you can take without having any side effects.  -Side effects can also include abnormal liver testing which does not lead to permanent liver damage and sometimes we need to keep patients on statins and tolerate mildly abnormal liver function testing.  -For some people, blood sugar increases slightly on statins.  Statins do not cause diabetes, however 1 in 500 people with pre-diabetes become diabetic 6 months faster.  -Statins do not cause Alzheimer's.  Unfortunately, the incidence of cancer, Alzheimer's, vascular disease and a multitude of other health problems has increased as has the use of statins but there is no direct correlation or causation.  -If someone makes it to 75 without a reason to be on a statin for primary prevention then it is not necessary to start statin therapy until someone goes on to have a vascular event such as heart attack and stroke.  At that time statin therapy is started for what we refer to as \"secondary prevention.\"    Statin holiday:  Your cholesterol medication can cause muscle aches/sore joints but sometimes it can be difficult to know if it is happening.  Stop your statin therapy for 1 week, note if you feel better, resume the therapy at full dose, note if you feel worse again.  Report back to your doctor if you think the statin is making you feel worse.    Calcium Scores-AKA the tie breaker for statins:  A calcium score is a " "low radiation dose CT scan of the chest to look for calcification of the heart arteries.  It is a screening tool to help assess cardiovascular risk.  It can only reveal calcified disease in the heart arteries, it is still possible to have dark plaque coronary disease that cannot be seen on the scan.  It cannot tell us if the disease is on the inside or outside of the vessel, only whether or not it is there. I use it as a tool to decide if statin therapy is indicated (such as Lipitor or Crestor) to help with primary prevention of heart attack.  Statin therapy provides anti-inflammatory therapy to stabilize plaques in the vessels reducing the risk of plaque rupture heart attack (or stroke) but in doing so often leads to further calcification of the heart arteries and therefore I do not repeat the scan as it can look worse after statin therapy.  If you have other indications to be on statin therapy then I generally do not recommend the test.  It is a personal decision.  It is an out of pocket expense of approximately $100-200 and can be ordered at any time.  If done at [facility], we can review the images together. Let me know if you think this test is right for you.  Again, I typically only order it if it will /therapy, such as starting statin therapy. I typically do not order the test in people over 70.      Family History of Heart Disease:  *If you have a strong family history of heart attack, bypass surgery or stents in the heart, consider having yourself and everyone in your family take a fasting blood test called Lipoprotein A to know more about your genetic risk.  *Lipoprotein A, also known as \"Lipo little a,\" Lp(a)-things to know:  *Elevated Lp(a) is considered an independent risk factor for heart attack and stroke.  *It is possible to have a normal LDL cholesterol (LDL-C) and still have elevated Lp(a).  *Elevated Lp(a) is genetic/inherited 80-90% of the time.  *At present there are no targeted " therapies but there are clinical trials.  *For some patients Repatha/Praluent (PCSK9 inhibitor therapies) have been shown to reduce Lp(a) levels but the long term benefit is not yet known.  *Traditional risk factor modification such as eating well and exercising does not impact Lp(a) levels.  *Lp(a) elevation is considered a triple threat because it leads to plaque build up in the blood vessels, increases inflammation which drives plaque rupture resulting in sudden heart attacks and strokes and is also pro-thrombotic (meaning promoting a blood clot to form in the setting of a plaque rupture event).  *Some people with elevated levels may benefit from aspirin therapy but we do not yet know who those people may be. A case by case discussion is needed with your provider.  *Although taking a statin does not lower the level of Lp(a), the statin can reduce the inflammation to protect against plaque rupture.  Eating well, exercising, lower stress, controlling blood sugar also reduce and offset inflammation and are still very important for your health.    *Apolipoprotein B is now known to be a better marker of risk over the traditional LDLc.     *HS CRP, a marker of inflammation to motivate you to improve lifestyle to reduce your risk. It is only a screening tool.      *Low risk: < 1 mg/L     *Moderate risk: 1-3 mg/L      *High risk: > 3 mg/L

## 2025-09-09 ENCOUNTER — APPOINTMENT (OUTPATIENT)
Dept: DERMATOLOGY | Facility: IMAGING CENTER | Age: 70
End: 2025-09-09
Payer: MEDICARE